# Patient Record
Sex: FEMALE | Race: WHITE | Employment: OTHER | URBAN - METROPOLITAN AREA
[De-identification: names, ages, dates, MRNs, and addresses within clinical notes are randomized per-mention and may not be internally consistent; named-entity substitution may affect disease eponyms.]

---

## 2017-04-13 ENCOUNTER — TELEPHONE (OUTPATIENT)
Dept: FAMILY MEDICINE | Facility: CLINIC | Age: 52
End: 2017-04-13

## 2017-04-13 NOTE — TELEPHONE ENCOUNTER
Talked with patient in [place] until May but is scheduling a pap and mammogram.  When comes in for that discuss colonoscopy or FIT. dp    Panel Management Review      Patient has the following on her problem list: None      Composite cancer screening  Chart review shows that this patient is due/due soon for the following Mammogram, Colonoscopy and Fecal Colorectal (FIT)  Summary:    Patient is due/failing the following:   COLONOSCOPY, FIT and MAMMOGRAM    Action needed:   Patient needs office visit for pap and mammogram.    Type of outreach:    Phone, spoke to patient.  is scheduling for a pap and mammogram.    Questions for provider review:    None                                                                                                                                    Virginia Ledesma     Chart routed to Care Team .

## 2017-04-28 DIAGNOSIS — G47.01 INSOMNIA DUE TO MEDICAL CONDITION: ICD-10-CM

## 2017-04-28 DIAGNOSIS — F41.1 GENERALIZED ANXIETY DISORDER: ICD-10-CM

## 2017-04-28 NOTE — TELEPHONE ENCOUNTER
Diazepam      Last Written Prescription Date: 10/12/2016  Last Fill Quantity: 30,  # refills: 5   Last Office Visit with [facility], [facility] or [facility] prescribing provider: 10/12/2016                                         Next 5 appointments (look out 90 days)     May 23, 2017  1:00 PM CDT   PHYSICAL with Dinorah Lorenzo DO   [facility]    [address] [id number] [phone]                    She is back from [place].

## 2017-04-28 NOTE — TELEPHONE ENCOUNTER
Pt uses at bedtime for EZEQUIEL and insomnia.    She was last seen 10/12/16.  Last filled same date.  Routed to provider for refill consideration.  Mei Palacios RN

## 2017-05-04 RX ORDER — DIAZEPAM 2 MG
2 TABLET ORAL
Qty: 30 TABLET | Status: CANCELLED | OUTPATIENT
Start: 2017-05-04

## 2017-05-04 NOTE — TELEPHONE ENCOUNTER
Left message for pt to call the care team for a provider message to them   Waiting on call back   Pt has a pending apt later this month see if she has medication till she is seen or if she wants to move apt up      Alice Vila  [facility]

## 2017-05-09 ENCOUNTER — OFFICE VISIT (OUTPATIENT)
Dept: FAMILY MEDICINE | Facility: CLINIC | Age: 52
End: 2017-05-09
Payer: MEDICARE

## 2017-05-09 VITALS
DIASTOLIC BLOOD PRESSURE: 63 MMHG | HEIGHT: 64 IN | WEIGHT: 156 LBS | SYSTOLIC BLOOD PRESSURE: 103 MMHG | BODY MASS INDEX: 26.63 KG/M2 | TEMPERATURE: 98 F | HEART RATE: 86 BPM

## 2017-05-09 DIAGNOSIS — G47.10 HYPERSOMNIA: ICD-10-CM

## 2017-05-09 DIAGNOSIS — E03.8 SUBCLINICAL HYPOTHYROIDISM: Chronic | ICD-10-CM

## 2017-05-09 DIAGNOSIS — E10.65 TYPE 1 DIABETES MELLITUS WITH HYPERGLYCEMIA (H): Chronic | ICD-10-CM

## 2017-05-09 DIAGNOSIS — G47.01 INSOMNIA DUE TO MEDICAL CONDITION: ICD-10-CM

## 2017-05-09 DIAGNOSIS — Z12.4 SCREENING FOR MALIGNANT NEOPLASM OF CERVIX: ICD-10-CM

## 2017-05-09 DIAGNOSIS — Z00.00 ENCOUNTER FOR GENERAL ADULT MEDICAL EXAMINATION WITHOUT ABNORMAL FINDINGS: Primary | ICD-10-CM

## 2017-05-09 DIAGNOSIS — F41.1 GENERALIZED ANXIETY DISORDER: ICD-10-CM

## 2017-05-09 DIAGNOSIS — I25.111 CORONARY ARTERY DISEASE INVOLVING NATIVE CORONARY ARTERY OF NATIVE HEART WITH ANGINA PECTORIS WITH DOCUMENTED SPASM (H): Chronic | ICD-10-CM

## 2017-05-09 DIAGNOSIS — Z12.11 SCREEN FOR COLON CANCER: ICD-10-CM

## 2017-05-09 LAB
HBA1C MFR BLD: 9.9 % (ref 4.3–6)
T4 FREE SERPL-MCNC: 1.01 NG/DL (ref 0.76–1.46)
TSH SERPL DL<=0.005 MIU/L-ACNC: 6.32 MU/L (ref 0.4–4)

## 2017-05-09 PROCEDURE — 99207 C FOOT EXAM  NO CHARGE: CPT | Mod: 25 | Performed by: INTERNAL MEDICINE

## 2017-05-09 PROCEDURE — 36415 COLL VENOUS BLD VENIPUNCTURE: CPT | Performed by: INTERNAL MEDICINE

## 2017-05-09 PROCEDURE — G0476 HPV COMBO ASSAY CA SCREEN: HCPCS | Performed by: INTERNAL MEDICINE

## 2017-05-09 PROCEDURE — 99213 OFFICE O/P EST LOW 20 MIN: CPT | Mod: 25 | Performed by: INTERNAL MEDICINE

## 2017-05-09 PROCEDURE — 87624 HPV HI-RISK TYP POOLED RSLT: CPT | Performed by: INTERNAL MEDICINE

## 2017-05-09 PROCEDURE — 83036 HEMOGLOBIN GLYCOSYLATED A1C: CPT | Performed by: INTERNAL MEDICINE

## 2017-05-09 PROCEDURE — 84443 ASSAY THYROID STIM HORMONE: CPT | Performed by: INTERNAL MEDICINE

## 2017-05-09 PROCEDURE — G0145 SCR C/V CYTO,THINLAYER,RESCR: HCPCS | Performed by: INTERNAL MEDICINE

## 2017-05-09 PROCEDURE — 84439 ASSAY OF FREE THYROXINE: CPT | Performed by: INTERNAL MEDICINE

## 2017-05-09 PROCEDURE — 99396 PREV VISIT EST AGE 40-64: CPT | Performed by: INTERNAL MEDICINE

## 2017-05-09 RX ORDER — DIAZEPAM 2 MG
2 TABLET ORAL
Qty: 30 TABLET | Refills: 5 | Status: SHIPPED | OUTPATIENT
Start: 2017-05-09 | End: 2017-11-09

## 2017-05-09 RX ORDER — LEVOTHYROXINE SODIUM 25 UG/1
25 TABLET ORAL DAILY
Qty: 90 TABLET | Refills: 3 | Status: SHIPPED | OUTPATIENT
Start: 2017-05-09 | End: 2017-05-09

## 2017-05-09 RX ORDER — METHYLPHENIDATE HYDROCHLORIDE 20 MG/1
TABLET ORAL
Qty: 30 TABLET | Refills: 0 | Status: SHIPPED | OUTPATIENT
Start: 2017-05-09 | End: 2017-08-15

## 2017-05-09 RX ORDER — METHYLPHENIDATE HYDROCHLORIDE 20 MG/1
10-20 TABLET ORAL DAILY
Qty: 30 TABLET | Refills: 0 | Status: SHIPPED | OUTPATIENT
Start: 2017-06-09 | End: 2017-08-15

## 2017-05-09 NOTE — NURSING NOTE
"Chief Complaint   Patient presents with     Physical     pap       Initial /63 (BP Location: Right arm, Patient Position: Chair, Cuff Size: Adult Regular)  Pulse 86  Temp 98  F (36.7  C) (Tympanic)  Ht 5' 4\" (1.626 m)  Wt 156 lb (70.8 kg)  BMI 26.78 kg/m2 Estimated body mass index is 26.78 kg/(m^2) as calculated from the following:    Height as of this encounter: 5' 4\" (1.626 m).    Weight as of this encounter: 156 lb (70.8 kg).  Medication Reconciliation: complete  "

## 2017-05-09 NOTE — PROGRESS NOTES
SUBJECTIVE:     CC: Glory Birmingham is an 51 year old woman who presents for preventive health visit.     Healthy Habits:    Do you get at least three servings of calcium containing foods daily (dairy, green leafy vegetables, etc.)? yes    Amount of exercise or daily activities, outside of work: 3 day(s) per week    Problems taking medications regularly No    Medication side effects: No    Have you had an eye exam in the past two years? yes    Do you see a dentist twice per year? No  Do you have sleep apnea, excessive snoring or daytime drowsiness?no    Dm-1:  In [place] for winter, returned 2 years ago.  Did see provider there, but only once.  A1c was not checked there.  She has appointment with Endo in [facility].  She doesn't want to continue to follow there because she doesn't feel that she is getting good care there.  Wants to get pump, but hasn't gotten it.  Has hypoglycemia unawareness, wants to get CGM.    Colon cancer screen;  Bro has polyps, getting cscope yearly.  Maternal grandfather had c-cancer  History of bilateral mastectomy.    Angina: occurred while in [place] after missing aspirin for several days/weeks.  Aspirin did help reduce frequency of angina.  Last stress in 2013.   She was also on imdur and ranexa.  imdura = headache;  Unclear why ranexa was stopped. On metoprolol.  Last episode several months ago.    1. Myocardial perfusion imaging using single isotope technique  demonstrated a small perfusion defect of mild severity involving the  mid anterior wall which is mildly reversible. These findings may be  consistent with mild ischemia in the ramus/diagonal territory.  2. Gated images demonstrated normal left ventricular wall motion. The  left ventricular systolic function is 58% at rest and 71% on the post  stress images.  3. Compared to the prior study from 2/19/2008, the findings appear  similar, since mild reversibility in the anterior wall is also  described on that study .         Today's  PHQ-2 Score:   PHQ-2 ( 1999 [employer]) 5/9/2017 6/7/2016   Q1: Little interest or pleasure in doing things 0 0   Q2: Feeling down, depressed or hopeless 0 0   PHQ-2 Score 0 0       Abuse: Current or Past(Physical, Sexual or Emotional)- No  Do you feel safe in your environment - Yes    Social History   Substance Use Topics     Smoking status: Never Smoker     Smokeless tobacco: Never Used     Alcohol use No     The patient does not drink >3 drinks per day nor >7 drinks per week.    Recent Labs   Lab Test 06/28/16  06/25/15   0910  02/12/14   0916   CHOL   --   175  200   HDL   --   83  84   LDL  84  78  94   TRIG   --   72  114   CHOLHDLRATIO   --   2.1  2.0       Reviewed orders with patient.  Reviewed health maintenance and updated orders accordingly - No    Mammo Decision Support:  Mammo discussed, not appropriate for or declined by this patient. History of bilateral mastectomy for nonmalignant reasons    Pertinent mammograms are reviewed under the imaging tab.  History of abnormal Pap smear: NO - age 30- 65 PAP every 3 years recommended    Reviewed and updated as needed this visit by clinical staff  Tobacco  Allergies  Med Hx  Surg Hx  Fam Hx         Reviewed and updated as needed this visit by Provider  Med Hx  Surg Hx  Fam Hx          ROS:  C: NEGATIVE for fever, chills, change in weight  I: NEGATIVE for worrisome rashes, moles or lesions  E: NEGATIVE for vision changes or irritation  ENT: NEGATIVE for ear, mouth and throat problems  R: NEGATIVE for significant cough or SOB  B: NEGATIVE for masses, tenderness or discharge  CV: NEGATIVE for chest pain, palpitations or peripheral edema  GI: NEGATIVE for nausea, abdominal pain, heartburn, or change in bowel habits  : NEGATIVE for unusual urinary or vaginal symptoms. No vaginal bleeding.  M: NEGATIVE for significant arthralgias or myalgia  N: NEGATIVE for weakness, dizziness or paresthesias  P: NEGATIVE for changes in mood or affect     Problem list,  Medication list, Allergies, and Medical/Social/Surgical histories reviewed in [facility] and updated as appropriate.  Labs reviewed in [facility]  Current Outpatient Prescriptions   Medication Sig Dispense Refill     insulin glargine (LANTUS SOLOSTAR) 100 UNIT/ML injection Inject 10 Units Subcutaneous 2 times daily 9 mL 11     methylphenidate (RITALIN) 20 MG tablet Take 1/2-1 tablet by mouth in the morning. 30 tablet 0     diazepam (VALIUM) 2 MG tablet Take 1 tablet (2 mg) by mouth nightly as needed for anxiety or sleep 30 tablet 5     [START ON 6/9/2017] methylphenidate (RITALIN) 20 MG tablet Take 0.5-1 tablets (10-20 mg) by mouth daily 30 tablet 0     losartan (COZAAR) 25 MG tablet Take 0.5 tablets (12.5 mg) by mouth daily 45 tablet 3     metoprolol (TOPROL-XL) 25 MG 24 hr tablet Take 1 tablet (25 mg) by mouth daily 90 tablet 2     estradiol (ESTRACE) 0.5 MG tablet 1 tab vaginallly twice a week 24 tablet 3     atorvastatin (LIPITOR) 10 MG tablet Take 5mg (one-half tablet) twice a week 30 tablet 3     amitriptyline (ELAVIL) 10 MG tablet Take 1 tablet (10 mg) by mouth At Bedtime 90 tablet 3     omeprazole (PRILOSEC) 20 MG capsule Take 1 capsule (20 mg) by mouth daily 90 capsule 3     OMEGA-3 FATTY ACIDS PO Take 1 capsule by mouth daily       insulin aspart (NOVOLOG VIAL) 100 UNITS/ML soln 1 unit per 12 grams of carb at meals, sliding scale: 150-190 +1, 191-230 +2, 231-270 +3, 271-310 +4, 311-350 +5, 351-390 +6, 391-430 +7, 431-470 +8 3 Month 3     magnesium 250 MG tablet Take 1 tablet by mouth daily.       Zinc 15 MG CAPS Take 1 tablet by mouth daily.       cholecalciferol (VITAMIN D) 1000 UNIT tablet Take 1 tablet by mouth daily. 1 tablet 0     ASPIRIN 81 MG OR TABS 1 TABLET DAILY       methylphenidate (RITALIN) 20 MG tablet Take 1/2-1 tablet by mouth in the morning. 30 tablet 0     glucagon (GLUCAGON EMERGENCY) 1 MG injection Inject 1 mg Subcutaneous once for 1 dose 1 mg 1     BD ULTRA FINE PEN NEEDLES Inject 1 each  "Subcutaneous 6 times daily 1 Box 3     [DISCONTINUED] insulin glargine (LANTUS SOLOSTAR) 100 UNIT/ML PEN Inject 9 Units Subcutaneous 2 times daily        nitroglycerin (NITROSTAT) 0.4 MG SL tablet Place 1 tablet (0.4 mg) under the tongue every 5 minutes as needed for chest pain 25 tablet 3     blood glucose test strip Use as directed, up to 6 times a day. One Touch test strips 500 strip 2     [DISCONTINUED] BD ULTRA FINE PEN NEEDLES Inject 1 each Subcutaneous 3 times daily. 1 Box 11     OBJECTIVE:     /63 (BP Location: Right arm, Patient Position: Chair, Cuff Size: Adult Regular)  Pulse 86  Temp 98  F (36.7  C) (Tympanic)  Ht 5' 4\" (1.626 m)  Wt 156 lb (70.8 kg)  BMI 26.78 kg/m2  EXAM:  GENERAL: healthy, alert and no distress  EYES: Eyes grossly normal to inspection, PERRL and conjunctivae and sclerae normal  HENT: ear canals and TM's normal, nose and mouth without ulcers or lesions  NECK: no adenopathy, no asymmetry, masses, or scars and thyroid normal to palpation  RESP: lungs clear to auscultation - no rales, rhonchi or wheezes  CV: regular rate and rhythm, normal S1 S2, no S3 or S4, no murmur, click or rub, no peripheral edema and peripheral pulses strong  ABDOMEN: soft, nontender, no hepatosplenomegaly, no masses and bowel sounds normal   (female): Atrophic female external genitalia, normal urethral meatus, vaginal mucosa pink, moist, well rugated, and normal cervix/adnexa/uterus without masses or discharge.  Pap collected  MS: no gross musculoskeletal defects noted, no edema  SKIN: no suspicious lesions or rashes  NEURO: Normal strength and tone, mentation intact and speech normal  PSYCH: mentation appears normal, affect normal/bright  diabetes foot exam:  Sensation absent in 10/10 spots tested bilaterally.  Fungal involvement of the skin and nails    ASSESSMENT/PLAN:     1. Encounter for general adult medical examination without abnormal findings    2. Type 1 diabetes mellitus with hyperglycemia " (HCC) - uncontrolled. She has a follow-up appointment with endocrinology next week. After that she will transition care back to myself. We'll have her set up with the diabetes educator to get the continuous glucose monitor and the pump. Return to clinic in 3 months for recheck  - insulin glargine (LANTUS SOLOSTAR) 100 UNIT/ML injection; Inject 10 Units Subcutaneous 2 times daily  Dispense: 9 mL; Refill: 11  - TSH with free T4 reflex  - Hemoglobin A1c  - DIABETES EDUCATOR REFERRAL  - Lipid panel reflex to direct LDL; Future  - **Albumin Random Urine Quant FUTURE 3mo; Future  - Basic metabolic panel; Future  - Hemoglobin A1c; Future  - FOOT EXAM    3. Coronary artery disease involving native coronary artery of native heart with angina pectoris with documented spasm (H) - recent angina, improved with resuming her regular aspirin. Patient has not had angina in 2 months. Advised patient to contact the clinic right away if angina recurs, and another stress test should be ordered.    4. Subclinical hypothyroidism - patient is on levothyroxine 25  g. Discussed that this is not medically indicated, and she can stop this therapy, which she agrees with. Recheck in 3-6 months.  - **TSH with free T4 reflex FUTURE 6mo; Future    5. Hypersomnia - stable, refill provided  - methylphenidate (RITALIN) 20 MG tablet; Take 1/2-1 tablet by mouth in the morning.  Dispense: 30 tablet; Refill: 0  - methylphenidate (RITALIN) 20 MG tablet; Take 0.5-1 tablets (10-20 mg) by mouth daily  Dispense: 30 tablet; Refill: 0    6. Generalized anxiety disorder - refill given  - diazepam (VALIUM) 2 MG tablet; Take 1 tablet (2 mg) by mouth nightly as needed for anxiety or sleep  Dispense: 30 tablet; Refill: 5    7. Insomnia due to medical condition - refill given  - diazepam (VALIUM) 2 MG tablet; Take 1 tablet (2 mg) by mouth nightly as needed for anxiety or sleep  Dispense: 30 tablet; Refill: 5    8. Screening for malignant neoplasm of cervix  - Pap  "imaged thin layer screen with HPV - recommended age 30 - 65 years (select HPV order below)  - HPV High Risk Types DNA Cervical    9. Screen for colon cancer - patient agreeable to cscope.  - GASTROENTEROLOGY ADULT REF PROCEDURE ONLY    1. Referral to diabetes RN for pump initiation.  2. You are due for a colonoscopy.  Please call [phone] to schedule.  Take 2/3 of long acting insulin Lantus while only taking clears.  Monitor blood sugar every 4-6 hours while taking clears.  Do not take Novolog while not eating.  3. Blood work today  4. Return to clinic 3 months, fasting blood work prior.  5. If angina occurs again, let Dr. Lorenzo know, we should do another stress test.  6. Stop thyroid medication.  We will recheck in 6 months.      COUNSELING:   Reviewed preventive health counseling, as reflected in patient instructions         reports that she has never smoked. She has never used smokeless tobacco.    Estimated body mass index is 26.78 kg/(m^2) as calculated from the following:    Height as of this encounter: 5' 4\" (1.626 m).    Weight as of this encounter: 156 lb (70.8 kg).       Counseling Resources:  ATP IV Guidelines  Pooled Cohorts Equation Calculator  Breast Cancer Risk Calculator  FRAX Risk Assessment  ICSI Preventive Guidelines  Dietary Guidelines for Americans, 2010  USDA's MyPlate  ASA Prophylaxis  Lung CA Screening    Dinorah Lorenzo, DO  [facility]  "

## 2017-05-09 NOTE — MR AVS SNAPSHOT
After Visit Summary   5/9/2017    Glory Birmingham    MRN: [id number]           Patient Information     Date Of Birth          1965        Visit Information        Provider Department      5/9/2017 1:40 PM Dinorah Lorenzo,  [facility]        Today's Diagnoses     Encounter for general adult medical examination without abnormal findings    -  1    Type 1 diabetes mellitus with hyperglycemia (HCC)        Subclinical hypothyroidism        Hypersomnia        Generalized anxiety disorder        Insomnia due to medical condition        Screening for malignant neoplasm of cervix        Screen for colon cancer        Coronary artery disease involving native coronary artery of native heart with angina pectoris with documented spasm (H)          Care Instructions          Thank you for choosing [facility].  You may be receiving a survey in the mail from [employer] regarding your visit today.  Please take a few minutes to complete and return the survey to let us know how we are doing.      If you have questions or concerns, please contact us via [employer] or you can contact your care team at [phone].    Our Clinic hours are:  Monday 6:40 am  to 7:00 pm  Tuesday -Friday 6:40 am to 5:00 pm    The [place] outpatient lab hours are:  Monday - Friday 6:10 am to 4:45 pm  Saturdays 7:00 am to 11:00 am  Appointments are required, call [phone]    If you have clinical questions after hours or would like to schedule an appointment,  call the clinic at [phone].      [facility] [address] [place] [address]    Preventive Health Recommendations  Female Ages 50 - 64    Yearly exam: See your health care provider every year in order to  o Review health changes.   o Discuss preventive care.    o Review your medicines if your doctor has prescribed any.      Get a Pap test every three years (unless you have an abnormal result and your provider advises testing more  often).    If you get Pap tests with HPV test, you only need to test every 5 years, unless you have an abnormal result.     You do not need a Pap test if your uterus was removed (hysterectomy) and you have not had cancer.    You should be tested each year for STDs (sexually transmitted diseases) if you're at risk.     Have a mammogram every 1 to 2 years.    Have a colonoscopy at age 50, or have a yearly FIT test (stool test). These exams screen for colon cancer.      Have a cholesterol test every 5 years, or more often if advised.    Have a diabetes test (fasting glucose) every three years. If you are at risk for diabetes, you should have this test more often.     If you are at risk for osteoporosis (brittle bone disease), think about having a bone density scan (DEXA).    Shots: Get a flu shot each year. Get a tetanus shot every 10 years.    Nutrition:     Eat at least 5 servings of fruits and vegetables each day.    Eat whole-grain bread, whole-wheat pasta and brown rice instead of white grains and rice.    Talk to your provider about Calcium and Vitamin D.     Lifestyle    Exercise at least 150 minutes a week (30 minutes a day, 5 days a week). This will help you control your weight and prevent disease.    Limit alcohol to one drink per day.    No smoking.     Wear sunscreen to prevent skin cancer.     See your dentist every six months for an exam and cleaning.    See your eye doctor every 1 to 2 years.      1. Referral to diabetes RN for pump initiation.  2. You are due for a colonoscopy.  Please call [phone] to schedule.  Take 2/3 of long acting insulin Lantus while only taking clears.  Monitor blood sugar every 4-6 hours while taking clears.  Do not take Novolog while not eating.  3. Blood work today  4. Return to clinic 3 months, fasting blood work prior.  5. If angina occurs again, let Dr. Lorenzo know, we should do another stress test.  6. Stop thyroid medication.  We will recheck in 6 months.           Follow-ups after your visit        Additional Services     DIABETES EDUCATOR REFERRAL       DIABETES SELF MANAGEMENT TRAINING (DSMT)      Your provider has referred you to Diabetes Education: [facility]: Diabetes Education - All [facility] [phone]   [email or web address]    Type of training and number of hours: Previous Diagnosis: Follow-up DSMT - 2 hours.    Medicare covers: 10 hours of initial DSMT in 12 month period from the time of first visit, plus 2 hours of follow-up DSMT annually, and additional hours as requested for insulin training.    Diabetes Type: Type 1             Diabetes Co-Morbidities: hypoglycemia unawareness               A1C Goal:  <7.0       A1C is: Lab Results       Component                Value               Date                       A1C                      9.9                 07/07/2016              If an urgent visit is needed or A1C is above 12, Care Team to call the Diabetes Education Team at [phone] or send an In Basket message to the Diabetes Education Pool (P DIAB ED-PATIENT CARE).    Diabetes Education Topics: Comprehensive Knowledge Assessment and Instruction and Insulin Pump Therapy: Pre-Pump Start Education    Special Educational Needs Requiring Individual DSMT: Additional Insulin Training       MEDICAL NUTRITION THERAPY (MNT) for Diabetes    Medical Nutrition Therapy with a Registered Dietitian can be provided in coordination with Diabetes Self-Management Training to assist in achieving optimal diabetes management.    MNT Type and Hours: Do not initiate MNT at this time.                       Medicare will cover: 3 hours initial MNT in 12 month period after first visit, plus 2 hours of follow-up MNT annually    Please be aware that coverage of these services is subject to the terms and limitations of your health insurance plan.  Call member services at your health plan to determine Diabetes Self-Management Training  benefits and ask which blood glucose monitor brands are covered by your plan.      Please bring the following with you to your appointment:    (1)  List of current medications   (2)  List of Blood Glucose Monitor brands that are covered by your insurance plan  (3)  Blood Glucose Monitor and log book  (4)   Food records for the 3 days prior to your visit    The Certified Diabetes Educator may make diabetes medication adjustments per the CDE Protocol and Collaborative Practice Agreement.            GASTROENTEROLOGY ADULT REF PROCEDURE ONLY                 Future tests that were ordered for you today     Open Future Orders        Priority Expected Expires Ordered    **TSH with free T4 reflex FUTURE 6mo Routine 11/5/2017 12/5/2017 5/9/2017    Lipid panel reflex to direct LDL Routine 8/8/2017 11/7/2017 5/9/2017    **Albumin Random Urine Quant FUTURE 3mo Routine 7/8/2017 9/6/2017 5/9/2017    Basic metabolic panel Routine 8/8/2017 11/7/2017 5/9/2017    Hemoglobin A1c Routine 8/8/2017 11/7/2017 5/9/2017            Who to contact     If you have questions or need follow up information about today's clinic visit or your schedule please contact [facility] directly at [phone].  Normal or non-critical lab and imaging results will be communicated to you by [employer], letter or phone within 4 business days after the clinic has received the results. If you do not hear from us within 7 days, please contact the clinic through [employer] or phone. If you have a critical or abnormal lab result, we will notify you by phone as soon as possible.  Submit refill requests through [email or web address] or call your pharmacy and they will forward the refill request to us. Please allow 3 business days for your refill to be completed.          Additional Information About Your Visit        [facility] Information     [email or web address] gives you secure access to your electronic health record. If you see a primary care provider, you can also send messages to  "your care team and make appointments. If you have questions, please call your primary care clinic.  If you do not have a primary care provider, please call [phone] and they will assist you.        Care EveryWhere ID     This is your Care EveryWhere ID. This could be used by other organizations to access your [facility] medical records  [id number]        Your Vitals Were     Pulse Temperature Height BMI (Body Mass Index)          86 98  F (36.7  C) (Tympanic) 5' 4\" (1.626 m) 26.78 kg/m2         Blood Pressure from Last 3 Encounters:   05/09/17 103/63   10/12/16 116/76   09/20/16 126/74    Weight from Last 3 Encounters:   05/09/17 156 lb (70.8 kg)   10/12/16 155 lb 9.6 oz (70.6 kg)   08/25/16 155 lb (70.3 kg)              We Performed the Following     DIABETES EDUCATOR REFERRAL     FOOT EXAM     GASTROENTEROLOGY ADULT REF PROCEDURE ONLY     Hemoglobin A1c     HPV High Risk Types DNA Cervical     Pap imaged thin layer screen with HPV - recommended age 30 - 65 years (select HPV order below)     TSH with free T4 reflex          Today's Medication Changes          These changes are accurate as of: 5/9/17  2:49 PM.  If you have any questions, ask your nurse or doctor.               These medicines have changed or have updated prescriptions.        Dose/Directions    insulin glargine 100 UNIT/ML injection   Commonly known as:  LANTUS SOLOSTAR   Indication:  and 5 units at bedtime   This may have changed:  how much to take   Used for:  Type 1 diabetes mellitus with hyperglycemia (H)   Changed by:  Dinorah Lorenzo DO        Dose:  10 Units   Inject 10 Units Subcutaneous 2 times daily   Quantity:  9 mL   Refills:  11       * methylphenidate 20 MG tablet   Commonly known as:  RITALIN   This may have changed:  Another medication with the same name was added. Make sure you understand how and when to take each.   Used for:  Hypersomnia   Changed by:  Dinorah Lorenzo DO        Take 1/2-1 tablet by mouth in the " morning.   Quantity:  30 tablet   Refills:  0       * methylphenidate 20 MG tablet   Commonly known as:  RITALIN   This may have changed:  Another medication with the same name was added. Make sure you understand how and when to take each.   Used for:  Hypersomnia   Changed by:  Dinorah Lorenzo DO        Take 1/2-1 tablet by mouth in the morning.   Quantity:  30 tablet   Refills:  0       * methylphenidate 20 MG tablet   Commonly known as:  RITALIN   This may have changed:  You were already taking a medication with the same name, and this prescription was added. Make sure you understand how and when to take each.   Used for:  Hypersomnia   Changed by:  Dinorah Lorenzo DO        Dose:  10-20 mg   Start taking on:  6/9/2017   Take 0.5-1 tablets (10-20 mg) by mouth daily   Quantity:  30 tablet   Refills:  0       * Notice:  This list has 3 medication(s) that are the same as other medications prescribed for you. Read the directions carefully, and ask your doctor or other care provider to review them with you.      Stop taking these medicines if you haven't already. Please contact your care team if you have questions.     levothyroxine 25 MCG tablet   Commonly known as:  SYNTHROID/LEVOTHROID   Stopped by:  Dinorah Lorenzo DO                Where to get your medicines      These medications were sent to [facility] [address] [place] [address]  [address] [facility] [address]     Phone:  [phone]     insulin glargine 100 UNIT/ML injection         Some of these will need a paper prescription and others can be bought over the counter.  Ask your nurse if you have questions.     Bring a paper prescription for each of these medications     diazepam 2 MG tablet    methylphenidate 20 MG tablet    methylphenidate 20 MG tablet                Primary Care Provider Office Phone # Fax #    Dinorah Lorenzo [phone]       [address]  [facility] [address]        Thank you!     Thank you for choosing [facility]  for your care. Our goal is always to provide you with excellent care. Hearing back from our patients is one way we can continue to improve our services. Please take a few minutes to complete the written survey that you may receive in the mail after your visit with us. Thank you!             Your Updated Medication List - Protect others around you: Learn how to safely use, store and throw away your medicines at www.disposemymeds.org.          This list is accurate as of: 5/9/17  2:49 PM.  Always use your most recent med list.                   Brand Name Dispense Instructions for use    amitriptyline 10 MG tablet    ELAVIL    90 tablet    Take 1 tablet (10 mg) by mouth At Bedtime       aspirin 81 MG tablet      1 TABLET DAILY       atorvastatin 10 MG tablet    LIPITOR    30 tablet    Take 5mg (one-half tablet) twice a week       BD ULTRA FINE PEN NEEDLES     1 Box    Inject 1 each Subcutaneous 6 times daily       blood glucose monitoring test strip    no brand specified    500 strip    Use as directed, up to 6 times a day. One Touch test strips       cholecalciferol 1000 UNIT tablet    vitamin D    1 tablet    Take 1 tablet by mouth daily.       diazepam 2 MG tablet    VALIUM    30 tablet    Take 1 tablet (2 mg) by mouth nightly as needed for anxiety or sleep       estradiol 0.5 MG tablet    ESTRACE    24 tablet    1 tab vaginallly twice a week       glucagon 1 MG kit    GLUCAGON EMERGENCY    1 mg    Inject 1 mg Subcutaneous once for 1 dose       insulin aspart 100 UNITS/ML injection    NovoLOG    3 Month    1 unit per 12 grams of carb at meals, sliding scale: 150-190 +1, 191-230 +2, 231-270 +3, 271-310 +4, 311-350 +5, 351-390 +6, 391-430 +7, 431-470 +8       insulin glargine 100 UNIT/ML injection    LANTUS SOLOSTAR    9 mL    Inject 10 Units Subcutaneous 2 times daily       losartan 25 MG tablet    COZAAR    45 tablet     Take 0.5 tablets (12.5 mg) by mouth daily       magnesium 250 MG tablet      Take 1 tablet by mouth daily.       * methylphenidate 20 MG tablet    RITALIN    30 tablet    Take 1/2-1 tablet by mouth in the morning.       * methylphenidate 20 MG tablet    RITALIN    30 tablet    Take 1/2-1 tablet by mouth in the morning.       * methylphenidate 20 MG tablet   Start taking on:  6/9/2017    RITALIN    30 tablet    Take 0.5-1 tablets (10-20 mg) by mouth daily       metoprolol 25 MG 24 hr tablet    TOPROL-XL    90 tablet    Take 1 tablet (25 mg) by mouth daily       nitroglycerin 0.4 MG sublingual tablet    NITROSTAT    25 tablet    Place 1 tablet (0.4 mg) under the tongue every 5 minutes as needed for chest pain       OMEGA-3 FATTY ACIDS PO      Take 1 capsule by mouth daily       omeprazole 20 MG CR capsule    priLOSEC    90 capsule    Take 1 capsule (20 mg) by mouth daily       Zinc 15 MG Caps      Take 1 tablet by mouth daily.       * Notice:  This list has 3 medication(s) that are the same as other medications prescribed for you. Read the directions carefully, and ask your doctor or other care provider to review them with you.

## 2017-05-09 NOTE — PATIENT INSTRUCTIONS
Thank you for choosing [facility].  You may be receiving a survey in the mail from [employer] regarding your visit today.  Please take a few minutes to complete and return the survey to let us know how we are doing.      If you have questions or concerns, please contact us via [employer] or you can contact your care team at [phone].    Our Clinic hours are:  Monday 6:40 am  to 7:00 pm  Tuesday -Friday 6:40 am to 5:00 pm    The [place] outpatient lab hours are:  Monday - Friday 6:10 am to 4:45 pm  Saturdays 7:00 am to 11:00 am  Appointments are required, call [phone]    If you have clinical questions after hours or would like to schedule an appointment,  call the clinic at [phone].      [facility] [address] [place] [address]    Preventive Health Recommendations  Female Ages 50 - 64    Yearly exam: See your health care provider every year in order to  o Review health changes.   o Discuss preventive care.    o Review your medicines if your doctor has prescribed any.      Get a Pap test every three years (unless you have an abnormal result and your provider advises testing more often).    If you get Pap tests with HPV test, you only need to test every 5 years, unless you have an abnormal result.     You do not need a Pap test if your uterus was removed (hysterectomy) and you have not had cancer.    You should be tested each year for STDs (sexually transmitted diseases) if you're at risk.     Have a mammogram every 1 to 2 years.    Have a colonoscopy at age 50, or have a yearly FIT test (stool test). These exams screen for colon cancer.      Have a cholesterol test every 5 years, or more often if advised.    Have a diabetes test (fasting glucose) every three years. If you are at risk for diabetes, you should have this test more often.     If you are at risk for osteoporosis (brittle bone disease), think about having a bone density scan (DEXA).    Shots: Get a flu shot each  year. Get a tetanus shot every 10 years.    Nutrition:     Eat at least 5 servings of fruits and vegetables each day.    Eat whole-grain bread, whole-wheat pasta and brown rice instead of white grains and rice.    Talk to your provider about Calcium and Vitamin D.     Lifestyle    Exercise at least 150 minutes a week (30 minutes a day, 5 days a week). This will help you control your weight and prevent disease.    Limit alcohol to one drink per day.    No smoking.     Wear sunscreen to prevent skin cancer.     See your dentist every six months for an exam and cleaning.    See your eye doctor every 1 to 2 years.      1. Referral to diabetes RN for pump initiation.  2. You are due for a colonoscopy.  Please call [phone] to schedule.  Take 2/3 of long acting insulin Lantus while only taking clears.  Monitor blood sugar every 4-6 hours while taking clears.  Do not take Novolog while not eating.  3. Blood work today  4. Return to clinic 3 months, fasting blood work prior.  5. If angina occurs again, let Dr. Lorenzo know, we should do another stress test.  6. Stop thyroid medication.  We will recheck in 6 months.

## 2017-05-11 LAB
COPATH REPORT: NORMAL
PAP: NORMAL

## 2017-05-15 LAB
FINAL DIAGNOSIS: NORMAL
HPV HR 12 DNA CVX QL NAA+PROBE: NEGATIVE
HPV16 DNA SPEC QL NAA+PROBE: NEGATIVE
HPV18 DNA SPEC QL NAA+PROBE: NEGATIVE
SPECIMEN DESCRIPTION: NORMAL

## 2017-05-16 ENCOUNTER — HOSPITAL ENCOUNTER (EMERGENCY)
Facility: CLINIC | Age: 52
Discharge: HOME | End: 2017-05-16
Attending: FAMILY MEDICINE | Admitting: FAMILY MEDICINE
Payer: MEDICARE

## 2017-05-16 ENCOUNTER — TELEPHONE (OUTPATIENT)
Dept: FAMILY MEDICINE | Facility: CLINIC | Age: 52
End: 2017-05-16

## 2017-05-16 ENCOUNTER — APPOINTMENT (OUTPATIENT)
Dept: GENERAL RADIOLOGY | Facility: CLINIC | Age: 52
End: 2017-05-16
Attending: FAMILY MEDICINE
Payer: MEDICARE

## 2017-05-16 VITALS
TEMPERATURE: 98.3 F | HEIGHT: 64 IN | HEART RATE: 99 BPM | SYSTOLIC BLOOD PRESSURE: 110 MMHG | DIASTOLIC BLOOD PRESSURE: 72 MMHG | WEIGHT: 154 LBS | BODY MASS INDEX: 26.29 KG/M2 | OXYGEN SATURATION: 99 % | RESPIRATION RATE: 9 BRPM

## 2017-05-16 DIAGNOSIS — K31.84 GASTROPARESIS: ICD-10-CM

## 2017-05-16 DIAGNOSIS — K22.4 ESOPHAGEAL SPASM: ICD-10-CM

## 2017-05-16 DIAGNOSIS — K21.9 GASTROESOPHAGEAL REFLUX DISEASE, ESOPHAGITIS PRESENCE NOT SPECIFIED: ICD-10-CM

## 2017-05-16 LAB
ALBUMIN SERPL-MCNC: 3.7 G/DL (ref 3.4–5)
ALP SERPL-CCNC: 134 U/L (ref 40–150)
ALT SERPL W P-5'-P-CCNC: 29 U/L (ref 0–50)
ANION GAP SERPL CALCULATED.3IONS-SCNC: 11 MMOL/L (ref 3–14)
AST SERPL W P-5'-P-CCNC: 20 U/L (ref 0–45)
BASOPHILS # BLD AUTO: 0 10E9/L (ref 0–0.2)
BASOPHILS NFR BLD AUTO: 0.4 %
BILIRUB SERPL-MCNC: 0.4 MG/DL (ref 0.2–1.3)
BUN SERPL-MCNC: 21 MG/DL (ref 7–30)
CALCIUM SERPL-MCNC: 9.1 MG/DL (ref 8.5–10.1)
CHLORIDE SERPL-SCNC: 102 MMOL/L (ref 94–109)
CO2 SERPL-SCNC: 27 MMOL/L (ref 20–32)
CREAT SERPL-MCNC: 0.86 MG/DL (ref 0.52–1.04)
DIFFERENTIAL METHOD BLD: NORMAL
EOSINOPHIL # BLD AUTO: 0.1 10E9/L (ref 0–0.7)
EOSINOPHIL NFR BLD AUTO: 1.2 %
ERYTHROCYTE [DISTWIDTH] IN BLOOD BY AUTOMATED COUNT: 12.1 % (ref 10–15)
GFR SERPL CREATININE-BSD FRML MDRD: 69 ML/MIN/1.7M2
GLUCOSE SERPL-MCNC: 283 MG/DL (ref 70–99)
HCT VFR BLD AUTO: 38.1 % (ref 35–47)
HGB BLD-MCNC: 13.3 G/DL (ref 11.7–15.7)
LIPASE SERPL-CCNC: 160 U/L (ref 73–393)
LYMPHOCYTES # BLD AUTO: 2 10E9/L (ref 0.8–5.3)
LYMPHOCYTES NFR BLD AUTO: 29.6 %
MCH RBC QN AUTO: 31.1 PG (ref 26.5–33)
MCHC RBC AUTO-ENTMCNC: 34.9 G/DL (ref 31.5–36.5)
MCV RBC AUTO: 89 FL (ref 78–100)
MONOCYTES # BLD AUTO: 0.5 10E9/L (ref 0–1.3)
MONOCYTES NFR BLD AUTO: 7.2 %
NEUTROPHILS # BLD AUTO: 4.2 10E9/L (ref 1.6–8.3)
NEUTROPHILS NFR BLD AUTO: 61.6 %
PLATELET # BLD AUTO: 255 10E9/L (ref 150–450)
POTASSIUM SERPL-SCNC: 3.9 MMOL/L (ref 3.4–5.3)
PROT SERPL-MCNC: 7.3 G/DL (ref 6.8–8.8)
RBC # BLD AUTO: 4.28 10E12/L (ref 3.8–5.2)
SODIUM SERPL-SCNC: 140 MMOL/L (ref 133–144)
TROPONIN I SERPL-MCNC: NORMAL UG/L (ref 0–0.04)
TROPONIN I SERPL-MCNC: NORMAL UG/L (ref 0–0.04)
WBC # BLD AUTO: 6.8 10E9/L (ref 4–11)

## 2017-05-16 PROCEDURE — 80053 COMPREHEN METABOLIC PANEL: CPT | Performed by: FAMILY MEDICINE

## 2017-05-16 PROCEDURE — 93010 ELECTROCARDIOGRAM REPORT: CPT | Performed by: FAMILY MEDICINE

## 2017-05-16 PROCEDURE — 71020 XR CHEST 2 VW: CPT

## 2017-05-16 PROCEDURE — 99285 EMERGENCY DEPT VISIT HI MDM: CPT | Mod: 25

## 2017-05-16 PROCEDURE — 83690 ASSAY OF LIPASE: CPT | Performed by: FAMILY MEDICINE

## 2017-05-16 PROCEDURE — 85025 COMPLETE CBC W/AUTO DIFF WBC: CPT | Performed by: FAMILY MEDICINE

## 2017-05-16 PROCEDURE — 93005 ELECTROCARDIOGRAM TRACING: CPT

## 2017-05-16 PROCEDURE — 99284 EMERGENCY DEPT VISIT MOD MDM: CPT | Mod: 25 | Performed by: FAMILY MEDICINE

## 2017-05-16 PROCEDURE — 84484 ASSAY OF TROPONIN QUANT: CPT | Mod: 91 | Performed by: FAMILY MEDICINE

## 2017-05-16 ASSESSMENT — ENCOUNTER SYMPTOMS
EYES NEGATIVE: 1
SHORTNESS OF BREATH: 1
PSYCHIATRIC NEGATIVE: 1
CONSTITUTIONAL NEGATIVE: 1
MUSCULOSKELETAL NEGATIVE: 1
NEUROLOGICAL NEGATIVE: 1
ABDOMINAL PAIN: 1
ALLERGIC/IMMUNOLOGIC NEGATIVE: 1
NAUSEA: 1
ENDOCRINE NEGATIVE: 1

## 2017-05-16 NOTE — TELEPHONE ENCOUNTER
"Reason for Call:  Other chest pain    Detailed comments: Sudden onset of sharp chest pain, took nitro chest pain went away.  This happened about 20 minutes ago.  Has not been feeling very well last couple of days \"just not feeling right\"   Never felt this kind of pain before.  transfer to RN    Phone Number Patient can be reached at: Home number on file [phone] (home)    Best Time: asap    Can we leave a detailed message on this number? YES    Call taken on 5/16/2017 at 2:53 PM by Martha Cash      "

## 2017-05-16 NOTE — ED NOTES
Pt presents to the ER with c/o epigastric pain, sudden onset around 1400 today.  Pt denies n/v/d, diaphoresis and dizziness.  She is not SOA.  Pt states she took 1 nitro SL and the pain resolved entirely.  She has remained pain free since that time.  Pt admits a h/o gastroparesis and states it is difficult to discern whether or not this pain is rt that.  She is in no acute distress.  LSC and pt denies cough.  She does state that she has been struggling with her gastroparesis recently and also reports blood sugar running in the 300's

## 2017-05-16 NOTE — ED PROVIDER NOTES
"  History     Chief Complaint   Patient presents with     Chest Pain     patient having episode   took nitro at home   Had  sharp pain   wilth burning  with burning after sharp pain      GI Problem     HX of gastroparesis  Patient states she thinks her heart and GI are  bothe bothering her      HPI  Glory Birmingham is a 51 year old female, past medical history is significant for type I diabetes, GERD, hypertension, amenorrhea, scleroderma, depression, fibromyalgia, generalized anxiety disorder, subclinical hypothyroidism, hyperlipidemia, recurrent UTI, diabetic gastroparesis, coccydynia, cervicalgia, bilateral occipital neuralgia, since to the emergency department with concerns of irregular heartbeat, chest pain, abdominal pain.  History is obtained from the patient states that at approximately 2:15 after consuming a \"breakfast\" of scrambled eggs mixed with turkey sausage, rao bits, she began with epigastric sharp intense constant pain that she rated as a 10/10.  Mildly nauseated, short of air with it.  She waited a few minutes and when it did not improve she took a nitro sublingual.  Within 2-3 minutes the discomfort lessened to the point where it is at currently which is a very mild 1-2/10 dull ache.  She has no other associated symptoms currently.  At the time of onset she did not have any lightheadedness palpitations radiation of the pain etc.  She has had reflux before but this felt different to her, she is also very aware that she has diabetic gastroparesis and even 4 hours after eating still has significant residual by her account in her stomach.  The symptoms were somewhat atypical for GI problem.  She has an approximately [address] to get to the hospital by her account.    Active Ambulatory Problems     Diagnosis Date Noted     Type 1 diabetes mellitus with hyperglycemia (HCC) 01/31/2006     Gastroesophageal reflux disease without esophagitis 01/31/2006     Benign essential hypertension 01/31/2006     " Amenorrhea 05/28/2008     Thyroid nodules 12/01/2009     Scleredema of [place] (H) 04/19/2010     Coronary artery disease involving native coronary artery of native heart with angina pectoris with documented spasm (H) 04/21/2010     CARDIOVASCULAR SCREENING; LDL GOAL LESS THAN 100 10/31/2010     Idiopathic hypersomnia with long sleep time 10/18/2011     Type 1 diabetes mellitus with diabetic neuropathy (H) 02/28/2012     Mild major depression (H) 08/29/2012     Fibromyalgia 08/19/2013     Generalized anxiety disorder 02/17/2014     History of bilateral saline breast implants 02/17/2014     Subclinical hypothyroidism 04/08/2014     Hyperlipidemia LDL goal <70 04/25/2014     Senile cataract 07/25/2014     Organ transplant candidate 08/07/2014     Recurrent UTI 10/06/2014     Proliferative diabetic retinopathy associated with type 1 diabetes mellitus, macular edema presence unspecified (H) 05/03/2016     Diabetic gastroparesis (H) 05/03/2016     Coccydynia 06/07/2016     Atrophic vaginitis 06/07/2016     Hypoglycemia unawareness in type 1 diabetes mellitus (H) 07/19/2016     Type 1 diabetes mellitus with proliferative retinopathy (H) 07/19/2016     Cervicalgia 08/02/2016     Bilateral occipital neuralgia 08/02/2016     Lumbosacral spondylosis without myelopathy 08/02/2016     Type 1 diabetes mellitus with microalbuminuria (H) 10/12/2016     Resolved Ambulatory Problems     Diagnosis Date Noted     Metrorrhagia 12/14/2006     Myalgia and myositis 04/19/2008     Gastritis, acute 09/19/2008     Type 1 diabetes, HbA1c goal < 8% (H) 10/31/2010     Proliferative diabetic retinopathy (H) 10/26/2011     Thyroid condition 01/18/2012     Pain in limb 02/28/2012     Low back pain 02/28/2012     Past Medical History:   Diagnosis Date     Background diabetic retinopathy(362.01)      CAD (coronary artery disease) 4/21/2010     Coronary artery disease      Depression      Dermatologic disease      Esophageal reflux      Fibromyalgia  2012     Generalized anxiety disorder 2/17/2014     History of bilateral saline breast implants 2/17/2014     Hypertension goal BP (blood pressure) < 140/90 1/31/2006     Idiopathic hypersomnia with long sleep time      Mild major depression (H) 8/29/2012     Multinodular goiter      Pain in limb 2/28/2012     Peripheral neuropathy (H)      Peripheral vascular disease (H)      PONV (postoperative nausea and vomiting)      Proliferative diabetic retinopathy 10/26/2011     Thyroid condition 1/18/2012     Thyroid nodules 12/1/2009     Type I (juvenile type) diabetes mellitus with other specified manifestations, not stated as uncontrolled 1976     Unspecified essential hypertension      Past Surgical History:   Procedure Laterality Date     BENCH PANCREAS  12/10/2012    Procedure: BENCH PANCREAS;;  Surgeon: Soto Mills MD;  Location: [facility] OR     CYSTECTOMY PILONIDAL N/A 7/13/2016    Procedure: CYSTECTOMY PILONIDAL;  Surgeon: Austin Wallace MD;  Location: [facility]     LAPAROSCOPY PROCEDURE UNLISTED  2007    diagnostic     MASTECTOMY, SIMPLE RT/LT/RASHAAD  2000    bilateral mastectomy, saline implants--fibrocystic disease--abnormal mammo-biopsies, etc--     MUSCLE BIOPSY  2008    back of neck,  path found tissue related to diabetes     PHACOEMULSIFICATION WITH STANDARD INTRAOCULAR LENS IMPLANT  7/28/2014    Procedure: PHACOEMULSIFICATION WITH STANDARD INTRAOCULAR LENS IMPLANT;  Surgeon: Roge Avendano MD;  Location: [facility] OR     PHACOEMULSIFICATION WITH STANDARD INTRAOCULAR LENS IMPLANT Right 8/25/2016    Procedure: PHACOEMULSIFICATION WITH STANDARD INTRAOCULAR LENS IMPLANT;  Surgeon: Roge Avendano MD;  Location: [facility] OR     SURGICAL HISTORY OF -       vitrectomy, bilateral     SURGICAL HISTORY OF -       c/section x 3, BTL     Social History     Social History     Marital status:      Spouse name: david     Number of children: 3     Years of education: N/A     Occupational History      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 Yes     Sister MI age 41     Social History Narrative     Family History   Problem Relation Age of Onset     GASTROINTESTINAL DISEASE Mother      Thyroid Disease Mother      Arthritis Mother      Cardiomyopathy Mother      CANCER Father      lung, spread to bone and brain     HEART DISEASE Father      Arthritis Father      DIABETES Sister           HEART DISEASE Sister      Thyroid Disease Sister      C.A.D. Sister      Obesity Daughter      Allergies Son      Depression Sister      Thyroid Disease Sister      Thyroid Disease Sister      Alcohol/Drug Brother      No current facility-administered medications for this encounter.      Current Outpatient Prescriptions   Medication     CINNAMON PO     CRANBERRY PO     insulin glargine (LANTUS SOLOSTAR) 100 UNIT/ML injection     methylphenidate (RITALIN) 20 MG tablet     diazepam (VALIUM) 2 MG tablet     losartan (COZAAR) 25 MG tablet     metoprolol (TOPROL-XL) 25 MG 24 hr tablet     estradiol (ESTRACE) 0.5 MG tablet     atorvastatin (LIPITOR) 10 MG tablet     amitriptyline (ELAVIL) 10 MG tablet     omeprazole (PRILOSEC) 20 MG capsule     OMEGA-3 FATTY ACIDS PO     nitroglycerin (NITROSTAT) 0.4 MG SL tablet     blood glucose test strip     insulin aspart (NOVOLOG VIAL) 100 UNITS/ML soln     magnesium 250 MG tablet     Zinc 15 MG CAPS     cholecalciferol (VITAMIN D) 1000 UNIT tablet     ASPIRIN 81 MG OR TABS     [START ON 2017] methylphenidate (RITALIN)  "20 MG tablet     glucagon (GLUCAGON EMERGENCY) 1 MG injection     BD ULTRA FINE PEN NEEDLES     [DISCONTINUED] BD ULTRA FINE PEN NEEDLES        Allergies   Allergen Reactions     Hmg-Coa-R Inhibitors Muscle Pain (Myalgia) and Cramps     Ibuprofen-Diphenhydramine Cit GI Disturbance     Severe gastritis     Lisinopril Cough     Choking spells       Prednisone GI Disturbance     Severe gastritis     Pregabalin Cramps       I have reviewed the Medications, Allergies, Past Medical and Surgical History, and Social History in the Epic system.    Review of Systems   Constitutional: Negative.    HENT: Negative.    Eyes: Negative.    Respiratory: Positive for shortness of breath.    Cardiovascular: Positive for chest pain.   Gastrointestinal: Positive for abdominal pain and nausea.   Endocrine: Negative.    Genitourinary: Negative.    Musculoskeletal: Negative.    Skin: Negative.    Allergic/Immunologic: Negative.    Neurological: Negative.    Psychiatric/Behavioral: Negative.    All other systems reviewed and are negative.      Physical Exam   BP: 141/82  Pulse: 99  Temp: 98.3  F (36.8  C)  Resp: 18  Height: 162.6 cm (5' 4\")  Weight: 69.9 kg (154 lb)  SpO2: 100 %  Physical Exam   Constitutional: She is oriented to person, place, and time. She appears well-developed and well-nourished.   HENT:   Head: Normocephalic and atraumatic.   Right Ear: External ear normal.   Left Ear: External ear normal.   Nose: Nose normal.   Mouth/Throat: Oropharynx is clear and moist.   Eyes: Conjunctivae and EOM are normal. Pupils are equal, round, and reactive to light.   Neck: Normal range of motion. Neck supple.   Cardiovascular: Normal rate, regular rhythm, normal heart sounds and intact distal pulses.    Pulmonary/Chest: Effort normal and breath sounds normal.   Abdominal: Soft. Bowel sounds are normal.   Musculoskeletal: Normal range of motion.   Neurological: She is alert and oriented to person, place, and time.   Skin: Skin is warm and " dry.   Psychiatric: She has a normal mood and affect. Her behavior is normal.   Nursing note and vitals reviewed.      ED Course     ED Course     Procedures              EKG Interpretation:      Interpreted by Scott Whitehead  Time reviewed:16:15   Symptoms at time of EKG: dull 1-2/10 epigastric discomfort   Rhythm: Normal sinus   Rate: 97    Axis: Normal  Ectopy: None  Conduction: Normal  ST Segments/ T Waves: No ST-T wave changes and No acute ischemic changes  Q Waves: None  Comparison to prior: Unchanged from 7/7/16    Clinical Impression: no acute changes        Critical Care time:  none               Labs Ordered and Resulted from Time of ED Arrival Up to the Time of Departure from the ED   COMPREHENSIVE METABOLIC PANEL - Abnormal; Notable for the following:        Result Value    Glucose 283 (*)     All other components within normal limits   CBC WITH PLATELETS DIFFERENTIAL   LIPASE   TROPONIN I   TROPONIN I   9:02 PM  Patient is asymptomatic, I reviewed negative cardiac troponins ×2 as well as the only significant abnormal being her blood sugar of 283 which is not unusual for her.  I suspect that the patient likely had an episode of esophageal spasm or perhaps reflux.  My suspicion for a cardiac etiology for this is very low given the behavior.  Even considering the use of nitroglycerin with some improvement of symptoms I think this is more likely spasm given the context.  I reassured her in this regard.  Her EKG shows no acute changes and her cardiac troponin is negative ×2.  Would recommend clear fluids and bland diet next 48 hours and return to the emergency department if worse or changes.      Assessments & Plan (with Medical Decision Making)   Assessment and plan with medical decision-making at the times Above.  Disposition to home.      Disclaimer: This note consists of symbols derived from keyboarding, dictation and/or voice recognition software. As a result, there may be errors in the script  that have gone undetected. Please consider this when interpreting information found in this chart.      I have reviewed the nursing notes.    I have reviewed the findings, diagnosis, plan and need for follow up with the patient.    New Prescriptions    No medications on file       Final diagnoses:   Esophageal spasm   Gastroesophageal reflux disease, esophagitis presence not specified   Gastroparesis       5/16/2017   [facility] EMERGENCY DEPARTMENT     Scott Whitehead MD  05/16/17 [id number]

## 2017-05-16 NOTE — ED AVS SNAPSHOT
[facility] Emergency Department    [address] [address]    Phone:  [phone]    Fax:  [phone]                                       Glory Birmingham   MRN: [id number]    Department:  [facility] Emergency Department   Date of Visit:  5/16/2017           After Visit Summary Signature Page     I have received my discharge instructions, and my questions have been answered. I have discussed any challenges I see with this plan with the nurse or doctor.    ..........................................................................................................................................  Patient/Patient Representative Signature      ..........................................................................................................................................  Patient Representative Print Name and Relationship to Patient    ..................................................               ................................................  Date                                            Time    ..........................................................................................................................................  Reviewed by Signature/Title    ...................................................              ..............................................  Date                                                            Time

## 2017-05-16 NOTE — TELEPHONE ENCOUNTER
S-(situation):  Pt describes that she was eating.  About an hour after eating, about 2:30 pm, pt felt a sudden sharp pain in the middle of her chest.  Chest heaviness too.  Unusual fatigue over the past 3 days.    Pt took nitroglycerine after the sharp pain began and nitro relieved the sharp pain.    Chest pain has resolved.    B-(background): Pt has type 1 diabetes.    A-(assessment): acute chest pain.    R-(recommendations):  just got home from work.  Advised he take her to ED now.  If symptoms recur in the meantime, stop, and call 911.  Mei Palacios RN

## 2017-05-16 NOTE — ED AVS SNAPSHOT
[facility] Emergency Department    [address] [address]    Phone:  [phone]    Fax:  [phone]                                       Glory Birmingham   MRN: [id number]    Department:  [facility] Emergency Department   Date of Visit:  5/16/2017           Patient Information     Date Of Birth          1965        Your diagnoses for this visit were:     Esophageal spasm     Gastroesophageal reflux disease, esophagitis presence not specified     Gastroparesis        You were seen by Scott Whitehead MD.      Follow-up Information     Schedule an appointment as soon as possible for a visit with Dinorah Lorenzo DO.    Specialty:  Internal Medicine    Why:  As needed, If symptoms worsen    Contact information:    [facility]  [address] [facility] [address]  [phone]          Discharge Instructions         Esophageal Spasm    The esophagus is a muscular tube that joins your mouth to your stomach. Contractions in the muscles in the esophagus help food move down to the stomach. When these muscles tighten or contract abnormally, it is known as spasm.   When the muscles spasm, it may feel like food is stuck and won t go down. It may cause a feeling of heartburn or a squeezing type of chest pain. The pain may spread to the neck, arm or back. If you try to swallow more food or liquid during a spasm, it may come back up within seconds.  Esophageal spasm is more common in people with gastroesophageal reflux disease (also called GERD). Very hot or very cold foods or foods that are not chewed enough before swallowing may trigger a spasm.  Home care  Medicines  Your healthcare provider may prescribe medicines to help reduce your symptoms. If you have an underlying condition, such as GERD, your healthcare provider may prescribe medicines to help manage it.   Take all medicines as prescribed. Do not take any medicines without talking to your healthcare provider  "first.  Diet    Avoid any foods that seem to cause spasm. This may include very hot or very cold foods.    Eat slowly and chew food well before swallowing.     Avoid alcohol, caffeine, and tobacco, which can delay healing and worsen the problem.    Try eating smaller meals. Have small snacks in between.  Follow-up care  Follow up with your healthcare provider or as advised by our staff.  When to seek medical advice  Call your healthcare provider if any of the following occur:    Food that feels \"stuck\" in the esophagus for more than 30 minutes    Inability to swallow solid or liquids for more than 30 minutes    Symptoms that feel like esophageal spasm but occur with heavy sweating, dizziness, or shortness of breath    Change in the usual patterns of your symptoms of esophageal spasm (new pattern of spreading to the neck, back, shoulder or arm; pain that is more severe than usual)    7404-2031 [employer]. [address] [place] [address]. All rights reserved. This information is not intended as a substitute for professional medical care. Always follow your healthcare professional's instructions.     bland diet and clear fluids ×48 hours.  Return if worse or changes.  Continue your current medications unchanged.      Future Appointments        Provider Department Dept Phone Center    5/18/2017 8:00 AM Noel Everett RD [facility] [phone] [facility]    8/11/2017 9:00 AM PI LAB [place] [phone] [facility]    8/15/2017 10:40 AM Dinorah Lorenzo,  [facility] [phone] [facility]      24 Hour Appointment Hotline       To make an appointment at any [facility], call [phone] ([phone]). If you don't have a family doctor or clinic, we will help you find one. [facility] are conveniently located to serve the needs of you and your family.             Review of your medicines      Our records show that you are taking the " medicines listed below. If these are incorrect, please call your family doctor or clinic.        Dose / Directions Last dose taken    amitriptyline 10 MG tablet   Commonly known as:  ELAVIL   Dose:  10 mg   Quantity:  90 tablet        Take 1 tablet (10 mg) by mouth At Bedtime   Refills:  3        aspirin 81 MG tablet        1 TABLET DAILY   Refills:  0        atorvastatin 10 MG tablet   Commonly known as:  LIPITOR   Quantity:  30 tablet        Take 5mg (one-half tablet) twice a week   Refills:  3        BD ULTRA FINE PEN NEEDLES   Dose:  1 each   Quantity:  1 Box        Inject 1 each Subcutaneous 6 times daily   Refills:  3        blood glucose monitoring test strip   Commonly known as:  no brand specified   Quantity:  500 strip        Use as directed, up to 6 times a day. One Touch test strips   Refills:  2        cholecalciferol 1000 UNIT tablet   Commonly known as:  vitamin D   Dose:  1000 Units   Quantity:  1 tablet        Take 1 tablet by mouth daily.   Refills:  0        CINNAMON PO   Dose:  500 mg        Take 500 mg by mouth daily   Refills:  0        CRANBERRY PO   Dose:  1 capsule        Take 1 capsule by mouth daily   Refills:  0        diazepam 2 MG tablet   Commonly known as:  VALIUM   Dose:  2 mg   Quantity:  30 tablet        Take 1 tablet (2 mg) by mouth nightly as needed for anxiety or sleep   Refills:  5        estradiol 0.5 MG tablet   Commonly known as:  ESTRACE   Quantity:  24 tablet        1 tab vaginallly twice a week   Refills:  3        glucagon 1 MG kit   Commonly known as:  GLUCAGON EMERGENCY   Dose:  1 mg   Quantity:  1 mg        Inject 1 mg Subcutaneous once for 1 dose   Refills:  1        insulin aspart 100 UNITS/ML injection   Commonly known as:  NovoLOG   Quantity:  3 Month        1 unit per 12 grams of carb at meals, sliding scale: 150-190 +1, 191-230 +2, 231-270 +3, 271-310 +4, 311-350 +5, 351-390 +6, 391-430 +7, 431-470 +8   Refills:  3        insulin glargine 100 UNIT/ML injection    Commonly known as:  LANTUS SOLOSTAR   Dose:  10 Units   Indication:  and 5 units at bedtime   Quantity:  9 mL        Inject 10 Units Subcutaneous 2 times daily   Refills:  11        losartan 25 MG tablet   Commonly known as:  COZAAR   Dose:  12.5 mg   Quantity:  45 tablet        Take 0.5 tablets (12.5 mg) by mouth daily   Refills:  3        magnesium 250 MG tablet   Dose:  1 tablet        Take 1 tablet by mouth daily.   Refills:  0        * methylphenidate 20 MG tablet   Commonly known as:  RITALIN   Quantity:  30 tablet        Take 1/2-1 tablet by mouth in the morning.   Refills:  0        * methylphenidate 20 MG tablet   Commonly known as:  RITALIN   Dose:  10-20 mg   Quantity:  30 tablet   Start taking on:  6/9/2017        Take 0.5-1 tablets (10-20 mg) by mouth daily   Refills:  0        metoprolol 25 MG 24 hr tablet   Commonly known as:  TOPROL-XL   Dose:  25 mg   Quantity:  90 tablet        Take 1 tablet (25 mg) by mouth daily   Refills:  2        nitroglycerin 0.4 MG sublingual tablet   Commonly known as:  NITROSTAT   Dose:  0.4 mg   Quantity:  25 tablet        Place 1 tablet (0.4 mg) under the tongue every 5 minutes as needed for chest pain   Refills:  3        OMEGA-3 FATTY ACIDS PO   Dose:  500 mg   Indication:  340-1,000 mg        Take 500 mg by mouth daily   Refills:  0        omeprazole 20 MG CR capsule   Commonly known as:  priLOSEC   Dose:  20 mg   Quantity:  90 capsule        Take 1 capsule (20 mg) by mouth daily   Refills:  3        Zinc 15 MG Caps   Dose:  1 capsule        Take 1 capsule by mouth daily   Refills:  0        * Notice:  This list has 2 medication(s) that are the same as other medications prescribed for you. Read the directions carefully, and ask your doctor or other care provider to review them with you.            Procedures and tests performed during your visit     Procedure/Test Number of Times Performed    CBC with platelets differential 1    Comprehensive metabolic panel 1     EKG 12 lead 1    Lipase 1    Troponin I 2    XR Chest 2 Views 1      Orders Needing Specimen Collection     None      Pending Results     No orders found from 5/14/2017 to 5/17/2017.            Pending Culture Results     No orders found from 5/14/2017 to 5/17/2017.            Pending Results Instructions     If you had any lab results that were not finalized at the time of your Discharge, you can call the ED Lab Result RN at [phone]. You will be contacted by this team for any positive Lab results or changes in treatment. The nurses are available 7 days a week from 10A to 6:30P.  You can leave a message 24 hours per day and they will return your call.        Test Results From Your Hospital Stay        5/16/2017  4:40 PM      Component Results     Component Value Ref Range & Units Status    WBC 6.8 4.0 - 11.0 10e9/L Final    RBC Count 4.28 3.8 - 5.2 10e12/L Final    Hemoglobin 13.3 11.7 - 15.7 g/dL Final    Hematocrit 38.1 35.0 - 47.0 % Final    MCV 89 78 - 100 fl Final    MCH 31.1 26.5 - 33.0 pg Final    MCHC 34.9 31.5 - 36.5 g/dL Final    RDW 12.1 10.0 - 15.0 % Final    Platelet Count 255 150 - 450 10e9/L Final    Diff Method Automated Method  Final    % Neutrophils 61.6 % Final    % Lymphocytes 29.6 % Final    % Monocytes 7.2 % Final    % Eosinophils 1.2 % Final    % Basophils 0.4 % Final    Absolute Neutrophil 4.2 1.6 - 8.3 10e9/L Final    Absolute Lymphocytes 2.0 0.8 - 5.3 10e9/L Final    Absolute Monocytes 0.5 0.0 - 1.3 10e9/L Final    Absolute Eosinophils 0.1 0.0 - 0.7 10e9/L Final    Absolute Basophils 0.0 0.0 - 0.2 10e9/L Final         5/16/2017  4:59 PM      Component Results     Component Value Ref Range & Units Status    Sodium 140 133 - 144 mmol/L Final    Potassium 3.9 3.4 - 5.3 mmol/L Final    Chloride 102 94 - 109 mmol/L Final    Carbon Dioxide 27 20 - 32 mmol/L Final    Anion Gap 11 3 - 14 mmol/L Final    Glucose 283 (H) 70 - 99 mg/dL Final    Urea Nitrogen 21 7 - 30 mg/dL Final    Creatinine  0.86 0.52 - 1.04 mg/dL Final    GFR Estimate 69 >60 mL/min/1.7m2 Final    Non  GFR Calc    GFR Estimate If Black 83 >60 mL/min/1.7m2 Final    African American GFR Calc    Calcium 9.1 8.5 - 10.1 mg/dL Final    Bilirubin Total 0.4 0.2 - 1.3 mg/dL Final    Albumin 3.7 3.4 - 5.0 g/dL Final    Protein Total 7.3 6.8 - 8.8 g/dL Final    Alkaline Phosphatase 134 40 - 150 U/L Final    ALT 29 0 - 50 U/L Final    AST 20 0 - 45 U/L Final         5/16/2017  4:59 PM      Component Results     Component Value Ref Range & Units Status    Lipase 160 73 - 393 U/L Final         5/16/2017  4:59 PM      Component Results     Component Value Ref Range & Units Status    Troponin I ES  0.000 - 0.045 ug/L Final    <0.015  The 99th percentile for upper reference range is 0.045 ug/L.  Troponin values in   the range of 0.045 - 0.120 ug/L may be associated with risks of adverse   clinical events.           5/16/2017  8:10 PM      Narrative     CHEST TWO VIEWS  5/16/2017  4:29 PM     HISTORY: Shortness of air.      COMPARISON: 8/7/2014.    FINDINGS: There are no acute infiltrates. The cardiac silhouette is  not enlarged. Pulmonary vasculature is unremarkable.        Impression     IMPRESSION: No acute disease.    DEBO LUCIA MD         5/16/2017  7:50 PM      Component Results     Component Value Ref Range & Units Status    Troponin I ES  0.000 - 0.045 ug/L Final    <0.015  The 99th percentile for upper reference range is 0.045 ug/L.  Troponin values in   the range of 0.045 - 0.120 ug/L may be associated with risks of adverse   clinical events.                  Thank you for choosing [facility]       Thank you for choosing [facility] for your care. Our goal is always to provide you with excellent care. Hearing back from our patients is one way we can continue to improve our services. Please take a few minutes to complete the written survey that you may receive in the mail after you visit with us. Thank you!        Kenny  Information     [employer] gives you secure access to your electronic health record. If you see a primary care provider, you can also send messages to your care team and make appointments. If you have questions, please call your primary care clinic.  If you do not have a primary care provider, please call [phone] and they will assist you.        Care EveryWhere ID     This is your Care EveryWhere ID. This could be used by other organizations to access your [place] medical records  [id number]        After Visit Summary       This is your record. Keep this with you and show to your community pharmacist(s) and doctor(s) at your next visit.

## 2017-05-17 NOTE — DISCHARGE INSTRUCTIONS
"  Esophageal Spasm    The esophagus is a muscular tube that joins your mouth to your stomach. Contractions in the muscles in the esophagus help food move down to the stomach. When these muscles tighten or contract abnormally, it is known as spasm.   When the muscles spasm, it may feel like food is stuck and won t go down. It may cause a feeling of heartburn or a squeezing type of chest pain. The pain may spread to the neck, arm or back. If you try to swallow more food or liquid during a spasm, it may come back up within seconds.  Esophageal spasm is more common in people with gastroesophageal reflux disease (also called GERD). Very hot or very cold foods or foods that are not chewed enough before swallowing may trigger a spasm.  Home care  Medicines  Your healthcare provider may prescribe medicines to help reduce your symptoms. If you have an underlying condition, such as GERD, your healthcare provider may prescribe medicines to help manage it.   Take all medicines as prescribed. Do not take any medicines without talking to your healthcare provider first.  Diet    Avoid any foods that seem to cause spasm. This may include very hot or very cold foods.    Eat slowly and chew food well before swallowing.     Avoid alcohol, caffeine, and tobacco, which can delay healing and worsen the problem.    Try eating smaller meals. Have small snacks in between.  Follow-up care  Follow up with your healthcare provider or as advised by our staff.  When to seek medical advice  Call your healthcare provider if any of the following occur:    Food that feels \"stuck\" in the esophagus for more than 30 minutes    Inability to swallow solid or liquids for more than 30 minutes    Symptoms that feel like esophageal spasm but occur with heavy sweating, dizziness, or shortness of breath    Change in the usual patterns of your symptoms of esophageal spasm (new pattern of spreading to the neck, back, shoulder or arm; pain that is more severe " than usual)    2832-6459 [employer]. [address] [place] [address], [place] [address]. All rights reserved. This information is not intended as a substitute for professional medical care. Always follow your healthcare professional's instructions.     bland diet and clear fluids ×48 hours.  Return if worse or changes.  Continue your current medications unchanged.

## 2017-05-17 NOTE — ED NOTES
Pt still pain free POC:  Draw 2nd troponin at 1900.  Pt and spouse understand and are in agreement with plan.

## 2017-05-18 ENCOUNTER — ALLIED HEALTH/NURSE VISIT (OUTPATIENT)
Dept: EDUCATION SERVICES | Facility: CLINIC | Age: 52
End: 2017-05-18
Payer: MEDICARE

## 2017-05-18 ENCOUNTER — TELEPHONE (OUTPATIENT)
Dept: EDUCATION SERVICES | Facility: CLINIC | Age: 52
End: 2017-05-18

## 2017-05-18 DIAGNOSIS — E10.65 TYPE 1 DIABETES MELLITUS WITH HYPERGLYCEMIA (H): Primary | Chronic | ICD-10-CM

## 2017-05-18 DIAGNOSIS — R80.9 TYPE 1 DIABETES MELLITUS WITH MICROALBUMINURIA (H): Primary | Chronic | ICD-10-CM

## 2017-05-18 DIAGNOSIS — E10.29 TYPE 1 DIABETES MELLITUS WITH MICROALBUMINURIA (H): Primary | Chronic | ICD-10-CM

## 2017-05-18 PROCEDURE — G0108 DIAB MANAGE TRN  PER INDIV: HCPCS

## 2017-05-18 NOTE — PROGRESS NOTES
Diabetes Self Management Training: Individual Review Visit    Glory Birmingham presents today for education related to Type 1 diabetes.    She is accompanied by self    Patient's diabetes management related comments/concerns: wants to get a pump and cgm    Patient's emotional response to diabetes: expresses readiness to learn    Patient would like this visit to be focused around the following diabetes-related behaviors and goals: Monitoring, Taking Medication and Problem Solving    ASSESSMENT:  Patient Problem List and Family Medical History reviewed for relevant medical history, current medical status, and diabetes risk factors.  Correction:  Novolog  191-235 +1  236-280 +2  281-325 +3  326-370 +4  371-415 +5  416-460 +6  461-485 +7    Insulin to carb ratio: 1 unit to 12  Basal: 10 in am and 10 in pm Lantus  Current Diabetes Management per Patient:  Taking diabetes medications?   yes:     Diabetes Medication(s)     Diabetic Other Sig    glucagon (GLUCAGON EMERGENCY) 1 MG injection Inject 1 mg Subcutaneous once for 1 dose    Insulin Sig    insulin glargine (LANTUS SOLOSTAR) 100 UNIT/ML injection Inject 10 Units Subcutaneous 2 times daily    insulin aspart (NOVOLOG VIAL) 100 UNITS/ML soln 1 unit per 12 grams of carb at meals, sliding scale: 150-190 +1, 191-230 +2, 231-270 +3, 271-310 +4, 311-350 +5, 351-390 +6, 391-430 +7, 431-470 +8          *Abbreviated insulin dose documentation key: Insulin Trade Name ([id number]) - i.e. Humalog 5-5-5-0 (Humalog 5 units at breakfast, 5 units at lunch, and 5 units at dinner).                        Past Diabetes Education: Yes    Patient glucose self monitoring as follows: four times daily.   BG meter: One Touch Ultra Mini meter  BG results:      BG values are: Not in goal  Patient's most recent   Lab Results   Component Value Date    A1C 9.9 05/09/2017    is not meeting goal of <7.0    Nutrition:  Pt does very will with carb counting not an issues, able to list  "carbs in items eaten.  Wrote down three days of food record and had carb listed.  Physical Activity:    Some outside activity    Diabetes Risk Factors:  age over 45 years    Diabetes Complications:  Not discussed today.    Vitals:  There were no vitals taken for this visit.  Estimated body mass index is 26.43 kg/(m^2) as calculated from the following:    Height as of 5/16/17: 1.626 m (5' 4\").    Weight as of 5/16/17: 69.9 kg (154 lb).   Last 3 BP:   BP Readings from Last 3 Encounters:   05/16/17 110/72   05/09/17 103/63   10/12/16 116/76       History   Smoking Status     Never Smoker   Smokeless Tobacco     Never Used       Labs:  Lab Results   Component Value Date    A1C 9.9 05/09/2017     Lab Results   Component Value Date     05/16/2017     Lab Results   Component Value Date    LDL 84 06/28/2016     HDL Cholesterol   Date Value Ref Range Status   06/25/2015 83 >50 mg/dL Final   ]  GFR Estimate   Date Value Ref Range Status   05/16/2017 69 >60 mL/min/1.7m2 Final     Comment:     Non  GFR Calc     GFR Estimate If Black   Date Value Ref Range Status   05/16/2017 83 >60 mL/min/1.7m2 Final     Comment:      GFR Calc     Lab Results   Component Value Date    CR 0.86 05/16/2017     No results found for: MICROALBUMIN    Socio/Economic Considerations:    Support system: family    Health Beliefs and Attitudes:   Patient Activation Measure Survey Score:  EDUARDO Score (Last Two) 3/7/2011   EDUARDO Raw Score 46   Activation Score 75.3   EDUARDO Level 4       Stage of Change: ACTION (Actively working towards change)      Diabetes knowledge and skills assessment:     Patient is knowledgeable in diabetes management concepts related to: Healthy Eating, Being Active, Monitoring and Taking Medication    Patient needs further education on the following diabetes management concepts: Taking Medication, Problem Solving, Reducing Risks and Healthy Coping    Barriers to Learning Assessment: No Barriers " identified    Based on learning assessment above, most appropriate setting for further diabetes education would be: Individual setting.    INTERVENTION:   Have worked with Glory in past  Her numbers are all over the place and have always been that way, she checks her BG levels and gives insulin. Take insulin after eating due to gastroparesis.  Pump would benefit that as well being able to take alternate boluses  She would be a good candidate for the new 670G pump by [employer]  Want to see what is happening overnight will have her wear diagnostic CGM for a week.  Education provided today on:  AADE Self-Care Behaviors:  Healthy Eating: carbohydrate counting and consistency in amount, composition, and timing of food intake  Monitoring: individual blood glucose targets and frequency of monitoring  Taking Medication: went over pumps and sensors, placed dexcom sensor.  Medicare not covering new 670G at this time, may do dexcom first and then transitiona to pump with sensor.    Opportunities for ongoing education and support in diabetes-self management were discussed.    Pt verbalized understanding of concepts discussed and recommendations provided today.       Education Materials Provided:  dexcom info  670G material provided    PLAN:  See Patient Instructions for co-developed, patient-stated behavior change goals.  AVS printed and provided to patient today.    FOLLOW-UP:  Follow-up appointment scheduled   Ongoing plan for education and support: Follow-up visit with diabetes educator in one month      Time Spent: 60 minutes  Encounter Type: Individual    Any diabetes medication dose changes were made via the CDE Protocol and Collaborative Practice Agreement with the patient's primary care provider. A copy of this encounter was shared with the provider.

## 2017-05-18 NOTE — MR AVS SNAPSHOT
"              After Visit Summary   5/18/2017    Glory [place]    MRN: [id number]           Patient Information     Date Of Birth          1965        Visit Information        Provider Department      5/18/2017 8:00 AM [address], RD [facility]        Care Instructions    1.  Wear sensor and record all information    1. After 2 hours, you will be prompted to enter 2 blood sugar readings to calibrate the . Take two different samples (from different fingers). Wash your hands well before calibrating.    2. Check blood sugar once at least every 12 hours and enter it into the Dexcom  to calibrate. Checking blood sugar before meals and/or at bedtime is recommended. Blood sugar levels must be between  for the  to accept the calibration.    3. Record what you eat at meals and snacks with portion eaten. Record amount of carbohydrate grams in the Dexcom  when you eat to help track response to food intake. Record exercise type and time. Record medications you take and the time they are taken.     4. When monitor reads that \"Sensor needs to be Changed,\" go into menu function and hit \"STOP\" (NOT shutdown)    5. If battery power is low (will see indicator on monitor) and need to recharge the monitor, only charge for 1-3 hours.  DO NOT charge for more than 3 hours since it can damage monitor!    6. Avoid taking Tylenol while wearing the Dexcom, as it can cause inaccurate glucose readings.     7. Return all equipment and any food/exercise/medication logs to the [facility] on Tuesday evening (see checklist).    8. Follow-up appointment for review of sensor reports scheduled.        Follow-ups after your visit        Your next 10 appointments already scheduled     Jun 21, 2017   Procedure with Austin Wallace MD   [facility] ([facility])    [address] [address] [phone]           The medical [place] " is located at [address] (between [address] in [place], [place] north of [place]).            Jun 22, 2017  9:00 AM CDT   Diabetic Education with [address], RD   [facility]    [address] [place] [address] [phone]            Jul 06, 2017  1:00 PM CDT   Diabetic Education with [address], RD   [facility] ([facility])    [address] [place] [address] [phone]            Aug 11, 2017  9:00 AM CDT   LAB with PI LAB   [facility] ([facility])    [address] [place] [address] [phone]           Patient must bring picture ID.  Patient should be prepared to give a urine specimen  Please do not eat 10-12 hours before your appointment if you are coming in fasting for labs on lipids, cholesterol, or glucose (sugar).  Pregnant women should follow their Care Team instructions. Water with medications is okay. Do not drink coffee or other fluids.   If you have concerns about taking  your medications, please ask at office or if scheduling via [employer], send a message by clicking on Secure Messaging, Message Your Care Team.            Aug 15, 2017 10:40 AM CDT   SHORT with Dinorah Lorenzo, DO   [facility] ([facility]    [address] [id number] [phone]              Who to contact     If you have questions or need follow up information about today's clinic visit or your schedule please contact [facility] directly at [phone].  Normal or non-critical lab and imaging results will be communicated to you by MyChart, letter or phone within 4 business days after the clinic has received the results. If you do not hear from us within 7 days, please contact the clinic through MyChart or phone. If you have a critical or abnormal lab result, we will notify you  by phone as soon as possible.  Submit refill requests through [employer] or call your pharmacy and they will forward the refill request to us. Please allow 3 business days for your refill to be completed.          Additional Information About Your Visit        [employer] Information     [employer] gives you secure access to your electronic health record. If you see a primary care provider, you can also send messages to your care team and make appointments. If you have questions, please call your primary care clinic.  If you do not have a primary care provider, please call [phone] and they will assist you.        Care EveryWhere ID     This is your Care EveryWhere ID. This could be used by other organizations to access your [place] medical records  [id number]         Blood Pressure from Last 3 Encounters:   05/16/17 110/72   05/09/17 103/63   10/12/16 116/76    Weight from Last 3 Encounters:   05/16/17 69.9 kg (154 lb)   05/09/17 70.8 kg (156 lb)   10/12/16 70.6 kg (155 lb 9.6 oz)              Today, you had the following     No orders found for display         Today's Medication Changes          These changes are accurate as of: 5/18/17  9:15 AM.  If you have any questions, ask your nurse or doctor.               These medicines have changed or have updated prescriptions.        Dose/Directions    atorvastatin 10 MG tablet   Commonly known as:  LIPITOR   This may have changed:    - how much to take  - how to take this  - when to take this  - additional instructions   Used for:  Coronary artery disease due to lipid rich plaque        Take 5mg (one-half tablet) twice a week   Quantity:  30 tablet   Refills:  3       estradiol 0.5 MG tablet   Commonly known as:  ESTRACE   This may have changed:    - how much to take  - how to take this  - when to take this  - additional instructions   Used for:  Atrophic vaginitis        1 tab vaginallly twice a week   Quantity:  24 tablet   Refills:  3                Primary Care  Provider Office Phone # Fax #    Dinorah Lorenzo [phone]       [facility] [address] [address]        Thank you!     Thank you for choosing [facility]  for your care. Our goal is always to provide you with excellent care. Hearing back from our patients is one way we can continue to improve our services. Please take a few minutes to complete the written survey that you may receive in the mail after your visit with us. Thank you!             Your Updated Medication List - Protect others around you: Learn how to safely use, store and throw away your medicines at www.disposemymeds.org.          This list is accurate as of: 5/18/17  9:15 AM.  Always use your most recent med list.                   Brand Name Dispense Instructions for use    amitriptyline 10 MG tablet    ELAVIL    90 tablet    Take 1 tablet (10 mg) by mouth At Bedtime       aspirin 81 MG tablet      1 TABLET DAILY       atorvastatin 10 MG tablet    LIPITOR    30 tablet    Take 5mg (one-half tablet) twice a week       BD ULTRA FINE PEN NEEDLES     1 Box    Inject 1 each Subcutaneous 6 times daily       blood glucose monitoring test strip    no brand specified    500 strip    Use as directed, up to 6 times a day. One Touch test strips       cholecalciferol 1000 UNIT tablet    vitamin D    1 tablet    Take 1 tablet by mouth daily.       CINNAMON PO      Take 500 mg by mouth daily       CRANBERRY PO      Take 1 capsule by mouth daily       diazepam 2 MG tablet    VALIUM    30 tablet    Take 1 tablet (2 mg) by mouth nightly as needed for anxiety or sleep       estradiol 0.5 MG tablet    ESTRACE    24 tablet    1 tab vaginallly twice a week       glucagon 1 MG kit    GLUCAGON EMERGENCY    1 mg    Inject 1 mg Subcutaneous once for 1 dose       insulin aspart 100 UNITS/ML injection    NovoLOG    3 Month    1 unit per 12 grams of carb at meals, sliding scale: 150-190 +1, 191-230 +2, 231-270  +3, 271-310 +4, 311-350 +5, 351-390 +6, 391-430 +7, 431-470 +8       insulin glargine 100 UNIT/ML injection    LANTUS SOLOSTAR    9 mL    Inject 10 Units Subcutaneous 2 times daily       losartan 25 MG tablet    COZAAR    45 tablet    Take 0.5 tablets (12.5 mg) by mouth daily       magnesium 250 MG tablet      Take 1 tablet by mouth daily.       * methylphenidate 20 MG tablet    RITALIN    30 tablet    Take 1/2-1 tablet by mouth in the morning.       * methylphenidate 20 MG tablet   Start taking on:  6/9/2017    RITALIN    30 tablet    Take 0.5-1 tablets (10-20 mg) by mouth daily       metoprolol 25 MG 24 hr tablet    TOPROL-XL    90 tablet    Take 1 tablet (25 mg) by mouth daily       nitroglycerin 0.4 MG sublingual tablet    NITROSTAT    25 tablet    Place 1 tablet (0.4 mg) under the tongue every 5 minutes as needed for chest pain       OMEGA-3 FATTY ACIDS PO      Take 500 mg by mouth daily       omeprazole 20 MG CR capsule    priLOSEC    90 capsule    Take 1 capsule (20 mg) by mouth daily       Zinc 15 MG Caps      Take 1 capsule by mouth daily       * Notice:  This list has 2 medication(s) that are the same as other medications prescribed for you. Read the directions carefully, and ask your doctor or other care provider to review them with you.

## 2017-05-18 NOTE — TELEPHONE ENCOUNTER
Dr. Lorenzo,     I saw Glory today and we are going to work on getting her on pump and CGM to better control her blood sugars.  Are you ok with placing a diagnostic CGM on her to help with insulin adjustment? (please place a diabetes education referral that list diagnostic sensor, working toward pump and CGM).  Also I need you to place a fasting blood sugar and c-peptide order for lab, she will need this to be on pump with medicare.  Thank you! Ryanne Everett RD, CDE

## 2017-05-18 NOTE — PATIENT INSTRUCTIONS
"1.  Wear sensor and record all information    1. After 2 hours, you will be prompted to enter 2 blood sugar readings to calibrate the . Take two different samples (from different fingers). Wash your hands well before calibrating.    2. Check blood sugar once at least every 12 hours and enter it into the Dexcom  to calibrate. Checking blood sugar before meals and/or at bedtime is recommended. Blood sugar levels must be between  for the  to accept the calibration.    3. Record what you eat at meals and snacks with portion eaten. Record amount of carbohydrate grams in the Dexcom  when you eat to help track response to food intake. Record exercise type and time. Record medications you take and the time they are taken.     4. When monitor reads that \"Sensor needs to be Changed,\" go into menu function and hit \"STOP\" (NOT shutdown)    5. If battery power is low (will see indicator on monitor) and need to recharge the monitor, only charge for 1-3 hours.  DO NOT charge for more than 3 hours since it can damage monitor!    6. Avoid taking Tylenol while wearing the Dexcom, as it can cause inaccurate glucose readings.     7. Return all equipment and any food/exercise/medication logs to the [facility] on Tuesday evening (see checklist).    8. Follow-up appointment for review of sensor reports scheduled.  "

## 2017-05-19 ENCOUNTER — VIRTUAL VISIT (OUTPATIENT)
Dept: EDUCATION SERVICES | Facility: CLINIC | Age: 52
End: 2017-05-19

## 2017-05-19 DIAGNOSIS — E10.65 TYPE 1 DIABETES MELLITUS WITH HYPERGLYCEMIA (H): Primary | Chronic | ICD-10-CM

## 2017-05-19 NOTE — MR AVS SNAPSHOT
After Visit Summary   5/19/2017    Glory [place]    MRN: [id number]           Patient Information     Date Of Birth          1965        Visit Information        Provider Department      5/19/2017 11:35 AM [address], RD [facility]        Today's Diagnoses     Type 1 diabetes mellitus with hyperglycemia (HCC)    -  1       Follow-ups after your visit        Your next 10 appointments already scheduled     Jun 21, 2017   Procedure with Austin Wallace MD   [facility] ([facility])    [address] [address]   [phone]           The medical center is located at [address] (between [address] in [place], four miles north of [place]).            Jun 22, 2017  9:00 AM CDT   Diabetic Education with [address], RD   [facility]    [address] [place] [address]   [phone]            Jul 06, 2017  1:00 PM CDT   Diabetic Education with [address]ne [address], RD   [facility]    [address] [address]   [phone]            Aug 11, 2017  9:00 AM CDT   LAB with PI LAB   [facility] ([facility])    [address] [place] [id number] [phone]           Patient must bring picture ID.  Patient should be prepared to give a urine specimen  Please do not eat 10-12 hours before your appointment if you are coming in fasting for labs on lipids, cholesterol, or glucose (sugar).  Pregnant women should follow their Care Team instructions. Water with medications is okay. Do not drink coffee or other fluids.   If you have concerns about taking  your medications, please ask at office or if scheduling via [employer], send a message by clicking on Secure Messaging, Message Your Care Team.            Aug 15, 2017 10:40 AM CDT   SHORT with Dinorah Lorenzo,  DO   [facility]    [address]  [facility] [address]   [phone]              Future tests that were ordered for you today     Open Future Orders        Priority Expected Expires Ordered    **Glucose FUTURE anytime Routine 5/18/2017 5/18/2018 5/18/2017    C-peptide Routine  5/18/2018 5/18/2017            Who to contact     If you have questions or need follow up information about today's clinic visit or your schedule please contact [facility] directly at [phone].  Normal or non-critical lab and imaging results will be communicated to you by MyChart, letter or phone within 4 business days after the clinic has received the results. If you do not hear from us within 7 days, please contact the clinic through [employer] or phone. If you have a critical or abnormal lab result, we will notify you by phone as soon as possible.  Submit refill requests through [employer] or call your pharmacy and they will forward the refill request to us. Please allow 3 business days for your refill to be completed.          Additional Information About Your Visit        MyChart Information     [employer] gives you secure access to your electronic health record. If you see a primary care provider, you can also send messages to your care team and make appointments. If you have questions, please call your primary care clinic.  If you do not have a primary care provider, please call [phone] and they will assist you.        Care EveryWhere ID     This is your Care EveryWhere ID. This could be used by other organizations to access your [place] medical records  [id number]         Blood Pressure from Last 3 Encounters:   05/16/17 110/72   05/09/17 103/63   10/12/16 116/76    Weight from Last 3 Encounters:   05/16/17 69.9 kg (154 lb)   05/09/17 70.8 kg (156 lb)   10/12/16 70.6 kg (155 lb 9.6 oz)              Today, you had the following     No orders found for display          Today's Medication Changes          These changes are accurate as of: 5/19/17 11:42 AM.  If you have any questions, ask your nurse or doctor.               These medicines have changed or have updated prescriptions.        Dose/Directions    atorvastatin 10 MG tablet   Commonly known as:  LIPITOR   This may have changed:    - how much to take  - how to take this  - when to take this  - additional instructions   Used for:  Coronary artery disease due to lipid rich plaque        Take 5mg (one-half tablet) twice a week   Quantity:  30 tablet   Refills:  3       * blood glucose monitoring test strip   Commonly known as:  no brand specified   This may have changed:  Another medication with the same name was added. Make sure you understand how and when to take each.   Used for:  Type I (juvenile type) diabetes mellitus with other specified manifestations, not stated as uncontrolled        Use as directed, up to 6 times a day. One Touch test strips   Quantity:  500 strip   Refills:  2       * blood glucose monitoring test strip   Commonly known as:  ONE TOUCH VERIO IQ   This may have changed:  You were already taking a medication with the same name, and this prescription was added. Make sure you understand how and when to take each.   Used for:  Type 1 diabetes mellitus with hyperglycemia (H)        Use to test blood sugar 6 times daily or as directed.   Quantity:  500 strip   Refills:  3       estradiol 0.5 MG tablet   Commonly known as:  ESTRACE   This may have changed:    - how much to take  - how to take this  - when to take this  - additional instructions   Used for:  Atrophic vaginitis        1 tab vaginallly twice a week   Quantity:  24 tablet   Refills:  3       * Notice:  This list has 2 medication(s) that are the same as other medications prescribed for you. Read the directions carefully, and ask your doctor or other care provider to review them with you.         Where to get your medicines      These  medications were sent to [id number] - [place], [place] - [address]   [address] , \[facility] [place] [facility]\"" [id number]     Phone:  [phone]     blood glucose monitoring test strip                Primary Care Provider Office Phone # Fax #    Dinorah Lorenzo [phone]       [facility] [address] [address]        Thank you!     Thank you for choosing [facility]  for your care. Our goal is always to provide you with excellent care. Hearing back from our patients is one way we can continue to improve our services. Please take a few minutes to complete the written survey that you may receive in the mail after your visit with us. Thank you!             Your Updated Medication List - Protect others around you: Learn how to safely use, store and throw away your medicines at www.disposemymeds.org.          This list is accurate as of: 5/19/17 11:42 AM.  Always use your most recent med list.                   Brand Name Dispense Instructions for use    amitriptyline 10 MG tablet    ELAVIL    90 tablet    Take 1 tablet (10 mg) by mouth At Bedtime       aspirin 81 MG tablet      1 TABLET DAILY       atorvastatin 10 MG tablet    LIPITOR    30 tablet    Take 5mg (one-half tablet) twice a week       BD ULTRA FINE PEN NEEDLES     1 Box    Inject 1 each Subcutaneous 6 times daily       * blood glucose monitoring test strip    no brand specified    500 strip    Use as directed, up to 6 times a day. One Touch test strips       * blood glucose monitoring test strip    ONE TOUCH VERIO IQ    500 strip    Use to test blood sugar 6 times daily or as directed.       cholecalciferol 1000 UNIT tablet    vitamin D    1 tablet    Take 1 tablet by mouth daily.       CINNAMON PO      Take 500 mg by mouth daily       CRANBERRY PO      Take 1 capsule by mouth daily       diazepam 2 MG tablet    VALIUM    30 tablet    Take 1 tablet (2 mg) by mouth nightly as needed for  anxiety or sleep       estradiol 0.5 MG tablet    ESTRACE    24 tablet    1 tab vaginallly twice a week       glucagon 1 MG kit    GLUCAGON EMERGENCY    1 mg    Inject 1 mg Subcutaneous once for 1 dose       insulin aspart 100 UNITS/ML injection    NovoLOG    3 Month    1 unit per 12 grams of carb at meals, sliding scale: 150-190 +1, 191-230 +2, 231-270 +3, 271-310 +4, 311-350 +5, 351-390 +6, 391-430 +7, 431-470 +8       insulin glargine 100 UNIT/ML injection    LANTUS SOLOSTAR    9 mL    Inject 10 Units Subcutaneous 2 times daily       losartan 25 MG tablet    COZAAR    45 tablet    Take 0.5 tablets (12.5 mg) by mouth daily       magnesium 250 MG tablet      Take 1 tablet by mouth daily.       * methylphenidate 20 MG tablet    RITALIN    30 tablet    Take 1/2-1 tablet by mouth in the morning.       * methylphenidate 20 MG tablet   Start taking on:  6/9/2017    RITALIN    30 tablet    Take 0.5-1 tablets (10-20 mg) by mouth daily       metoprolol 25 MG 24 hr tablet    TOPROL-XL    90 tablet    Take 1 tablet (25 mg) by mouth daily       nitroglycerin 0.4 MG sublingual tablet    NITROSTAT    25 tablet    Place 1 tablet (0.4 mg) under the tongue every 5 minutes as needed for chest pain       OMEGA-3 FATTY ACIDS PO      Take 500 mg by mouth daily       omeprazole 20 MG CR capsule    priLOSEC    90 capsule    Take 1 capsule (20 mg) by mouth daily       Zinc 15 MG Caps      Take 1 capsule by mouth daily       * Notice:  This list has 4 medication(s) that are the same as other medications prescribed for you. Read the directions carefully, and ask your doctor or other care provider to review them with you.

## 2017-05-22 DIAGNOSIS — R80.9 TYPE 1 DIABETES MELLITUS WITH MICROALBUMINURIA (H): Chronic | ICD-10-CM

## 2017-05-22 DIAGNOSIS — E10.29 TYPE 1 DIABETES MELLITUS WITH MICROALBUMINURIA (H): Chronic | ICD-10-CM

## 2017-05-22 PROCEDURE — 36415 COLL VENOUS BLD VENIPUNCTURE: CPT | Performed by: INTERNAL MEDICINE

## 2017-05-22 PROCEDURE — 82947 ASSAY GLUCOSE BLOOD QUANT: CPT | Performed by: INTERNAL MEDICINE

## 2017-05-22 PROCEDURE — 84681 ASSAY OF C-PEPTIDE: CPT | Performed by: INTERNAL MEDICINE

## 2017-05-23 LAB — GLUCOSE SERPL-MCNC: 161 MG/DL (ref 70–99)

## 2017-05-24 LAB — C PEPTIDE SERPL-MCNC: <0.1 NG/ML (ref 0.9–6.9)

## 2017-06-21 ENCOUNTER — ANESTHESIA EVENT (OUTPATIENT)
Dept: GASTROENTEROLOGY | Facility: CLINIC | Age: 52
End: 2017-06-21
Payer: MEDICARE

## 2017-06-21 NOTE — ANESTHESIA PREPROCEDURE EVALUATION
Anesthesia Evaluation     . Pt has had prior anesthetic.     History of anesthetic complications   - PONV        ROS/MED HX    ENT/Pulmonary:  - neg pulmonary ROS     Neurologic:     (+)other neuro bilat occipital neuralgia , idiopathic hypersomnia with long sleep time     Cardiovascular: Comment: GATED MYOCARDIAL PERFUSION SCINTIGRAPHY WITH INTRAVENOUS PHARMACOLOGIC  VASODILATATION LEXISCAN -ONE DAY STUDY      12/30/2013 1:19 PM  LUPILLO PETERS  48 years  Female  1965.     Indication/Clinical History: Coronary artery disease     Impression  1.  Myocardial perfusion imaging using single isotope technique  demonstrated a small perfusion defect of mild severity involving the  mid anterior wall which is mildly reversible. These findings may be  consistent with mild ischemia in the ramus/diagonal territory.  2. Gated images demonstrated normal left ventricular wall motion.  The  left ventricular systolic function is 58% at rest and 71% on the post  stress images.  3. Compared to the prior study from 2/19/2008, the findings appear  similar, since mild reversibility in the anterior wall is also  described on that study .     Procedure  Pharmacologic stress testing was performed with Lexiscan at a rate of  0.08 mg/ml rapid bolus injection, for 15 seconds, 0.4 mg/5ml  intravenously. Low-level exercise was not performed along with the  vasodilator infusion.  Myocardial perfusion imaging was performed at rest, approximately 45  minutes after the injection intravenously of 7.9 mCi of Tc-99m  Myoview. At peak pharmacologic effect, 10-20 seconds after Lexiscan,   the patient was injected intravenously with 25.0 mCi of  Tc-99m  Myoview. The post-stress tomographic imaging was performed  approximately 60 minutes after stress.        EKG Findings  These are reported separately.     Tomographic Findings  Overall, the study quality is fair . On the stress images, there is a  small perfusion defect of mild severity involving  the mid anterior  wall. On the rest images,perfusion is normal . Gated images  demonstrated normal left ventricular wall motion. The left ventricular  ejection fraction was calculated to be 58% at rest and 71% on the  post-rest images. TID was absent.     VALERIO ROMO MD        (+) hypertension-Peripheral Vascular Disease-CAD, --. : . . . :. .       METS/Exercise Tolerance:  >4 METS   Hematologic:  - neg hematologic  ROS       Musculoskeletal:   (+) , , other musculoskeletal- fibromyalgia, cervicalgia, spondylosis , coccydynia      GI/Hepatic:     (+) GERD Other GI/Hepatic diabetic gastroparesis      Renal/Genitourinary:     (+) Other Renal/ Genitourinary, recurrent UTI      Endo:     (+) type I DM, Diabetic complications: neuropathy retinopathy, thyroid problem .      Psychiatric:     (+) psychiatric history depression and anxiety      Infectious Disease:  - neg infectious disease ROS       Malignancy:      - no malignancy   Other: Comment: scleroderma                    Physical Exam  Normal systems: cardiovascular, pulmonary and dental    Airway   Mallampati: II  TM distance: >3 FB  Neck ROM: full    Dental     Cardiovascular   Rhythm and rate: regular and normal      Pulmonary    breath sounds clear to auscultation                    Anesthesia Plan      History & Physical Review  History and physical reviewed and following examination; no interval change.    ASA Status:  3 .    NPO Status:  > 4 hours    Plan for General with Propofol induction. Maintenance will be TIVA.           Postoperative Care      Consents  Anesthetic plan, risks, benefits and alternatives discussed with:  Patient..                          .

## 2017-06-23 ENCOUNTER — SURGERY (OUTPATIENT)
Age: 52
End: 2017-06-23

## 2017-06-23 ENCOUNTER — HOSPITAL ENCOUNTER (OUTPATIENT)
Facility: CLINIC | Age: 52
Discharge: HOME | End: 2017-06-23
Attending: SURGERY | Admitting: SURGERY
Payer: MEDICARE

## 2017-06-23 ENCOUNTER — ANESTHESIA (OUTPATIENT)
Dept: GASTROENTEROLOGY | Facility: CLINIC | Age: 52
End: 2017-06-23
Payer: MEDICARE

## 2017-06-23 VITALS
WEIGHT: 150 LBS | SYSTOLIC BLOOD PRESSURE: 118 MMHG | RESPIRATION RATE: 16 BRPM | BODY MASS INDEX: 25.61 KG/M2 | TEMPERATURE: 98 F | DIASTOLIC BLOOD PRESSURE: 70 MMHG | HEIGHT: 64 IN | OXYGEN SATURATION: 100 %

## 2017-06-23 LAB — COLONOSCOPY: NORMAL

## 2017-06-23 PROCEDURE — G0121 COLON CA SCRN NOT HI RSK IND: HCPCS | Performed by: SURGERY

## 2017-06-23 PROCEDURE — 25000125 ZZHC RX 250: Performed by: SURGERY

## 2017-06-23 PROCEDURE — 25000125 ZZHC RX 250: Performed by: NURSE ANESTHETIST, CERTIFIED REGISTERED

## 2017-06-23 PROCEDURE — 45378 DIAGNOSTIC COLONOSCOPY: CPT | Performed by: SURGERY

## 2017-06-23 PROCEDURE — 37000008 ZZH ANESTHESIA TECHNICAL FEE, 1ST 30 MIN: Performed by: SURGERY

## 2017-06-23 PROCEDURE — 25000128 H RX IP 250 OP 636: Performed by: SURGERY

## 2017-06-23 RX ORDER — SODIUM CHLORIDE, SODIUM LACTATE, POTASSIUM CHLORIDE, CALCIUM CHLORIDE 600; 310; 30; 20 MG/100ML; MG/100ML; MG/100ML; MG/100ML
INJECTION, SOLUTION INTRAVENOUS CONTINUOUS
Status: DISCONTINUED | OUTPATIENT
Start: 2017-06-23 | End: 2017-06-23 | Stop reason: HOSPADM

## 2017-06-23 RX ORDER — LIDOCAINE 40 MG/G
CREAM TOPICAL
Status: DISCONTINUED | OUTPATIENT
Start: 2017-06-23 | End: 2017-06-23 | Stop reason: HOSPADM

## 2017-06-23 RX ORDER — PROPOFOL 10 MG/ML
INJECTION, EMULSION INTRAVENOUS PRN
Status: DISCONTINUED | OUTPATIENT
Start: 2017-06-23 | End: 2017-06-23

## 2017-06-23 RX ORDER — PROPOFOL 10 MG/ML
INJECTION, EMULSION INTRAVENOUS CONTINUOUS PRN
Status: DISCONTINUED | OUTPATIENT
Start: 2017-06-23 | End: 2017-06-23

## 2017-06-23 RX ORDER — ONDANSETRON 2 MG/ML
4 INJECTION INTRAMUSCULAR; INTRAVENOUS
Status: DISCONTINUED | OUTPATIENT
Start: 2017-06-23 | End: 2017-06-23 | Stop reason: HOSPADM

## 2017-06-23 RX ADMIN — PROPOFOL 200 MCG/KG/MIN: 10 INJECTION, EMULSION INTRAVENOUS at 09:22

## 2017-06-23 RX ADMIN — SODIUM CHLORIDE, POTASSIUM CHLORIDE, SODIUM LACTATE AND CALCIUM CHLORIDE: 600; 310; 30; 20 INJECTION, SOLUTION INTRAVENOUS at 08:33

## 2017-06-23 RX ADMIN — LIDOCAINE HYDROCHLORIDE 40 MG: 10 INJECTION, SOLUTION EPIDURAL; INFILTRATION; INTRACAUDAL; PERINEURAL at 09:22

## 2017-06-23 RX ADMIN — PROPOFOL 100 MG: 10 INJECTION, EMULSION INTRAVENOUS at 09:22

## 2017-06-23 RX ADMIN — LIDOCAINE HYDROCHLORIDE 1 ML: 10 INJECTION, SOLUTION EPIDURAL; INFILTRATION; INTRACAUDAL; PERINEURAL at 08:34

## 2017-06-23 NOTE — ANESTHESIA POSTPROCEDURE EVALUATION
Patient: Glory Birmingham    Procedure(s):  Colonoscopy   - Wound Class: II-Clean Contaminated    Diagnosis:screening  Diagnosis Additional Information: No value filed.    Anesthesia Type:  General    Note:  Anesthesia Post Evaluation    Patient location during evaluation: Phase 2 and Bedside  Patient participation: Able to fully participate in evaluation  Level of consciousness: awake and alert  Pain management: adequate  Airway patency: patent  Cardiovascular status: acceptable  Respiratory status: acceptable  Hydration status: acceptable  PONV: none     Anesthetic complications: None          Last vitals:  Vitals:    06/23/17 0823   BP: 132/73   Resp: 16   Temp: 36.7  C (98  F)   SpO2: 100%         Electronically Signed By: Jasson Chapman CRNA, APRN CRNA  June 23, 2017  9:44 AM

## 2017-06-23 NOTE — ANESTHESIA CARE TRANSFER NOTE
Patient: Glory Birmingham    Procedure(s):  Colonoscopy   - Wound Class: II-Clean Contaminated    Diagnosis: screening  Diagnosis Additional Information: No value filed.    Anesthesia Type:   General     Note:  Airway :Nasal Cannula  Patient transferred to:Phase II        Vitals: (Last set prior to Anesthesia Care Transfer)    CRNA VITALS  6/23/2017 0914 - 6/23/2017 0944      6/23/2017             Pulse: 79    Ht Rate: 72    SpO2: 100 %                Electronically Signed By: Jasson Chapman CRNA, APRN CRNA  June 23, 2017  9:44 AM

## 2017-06-23 NOTE — H&P
[facility]  GI Pre-Procedure History & Physical      Name: Glory Birmingham MRN#: [id number]   Age: 51 year old YOB: 1965     Date of Procedure: 6/23/2017    Primary care provider: Dinorah Lorenzo      Reason for Procedure:   Screening (V76.51)    Problem List:     Patient Active Problem List   Diagnosis     Type 1 diabetes mellitus with hyperglycemia (HCC)     Gastroesophageal reflux disease without esophagitis     Benign essential hypertension     Amenorrhea     Thyroid nodules     Scleredema of [place] (H)     Coronary artery disease involving native coronary artery of native heart with angina pectoris with documented spasm (H)     CARDIOVASCULAR SCREENING; LDL GOAL LESS THAN 100     Idiopathic hypersomnia with long sleep time     Type 1 diabetes mellitus with diabetic neuropathy (H)     Mild major depression (H)     Fibromyalgia     Generalized anxiety disorder     History of bilateral saline breast implants     Subclinical hypothyroidism     Hyperlipidemia LDL goal <70     Senile cataract     Organ transplant candidate     Recurrent UTI     Proliferative diabetic retinopathy associated with type 1 diabetes mellitus, macular edema presence unspecified (H)     Diabetic gastroparesis (H)     Coccydynia     Atrophic vaginitis     Hypoglycemia unawareness in type 1 diabetes mellitus (H)     Type 1 diabetes mellitus with proliferative retinopathy (H)     Cervicalgia     Bilateral occipital neuralgia     Lumbosacral spondylosis without myelopathy     Type 1 diabetes mellitus with microalbuminuria (H)       Current Outpatient Medications:      Current Outpatient Rx   Medication Sig Dispense Refill     [DISCONTINUED] BD ULTRA FINE PEN NEEDLES Inject 1 each Subcutaneous 3 times daily. 1 Box 11        Allergies:      Allergies   Allergen Reactions     Hmg-Coa-R Inhibitors Muscle Pain (Myalgia) and Cramps     Ibuprofen-Diphenhydramine Cit GI Disturbance     Severe gastritis     Lisinopril Cough      Choking spells       Prednisone GI Disturbance     Severe gastritis     Pregabalin Cramps        History:   Medical:  Past Medical History:   Diagnosis Date     Background diabetic retinopathy(362.01)     s/p photocoagulation     CAD (coronary artery disease) 4/21/2010 1/2014 She is known to have an occluded branch of her ramus with mild disease in the remainder of her coronary system based on angiography in 2008, along with repeat angiography in 2012. She has a history of stable angina which improved with Toprol-XL and Ranexa therapy.  Equivocal stress nuclear 5/08, breast attenuation vs. Small ischemic area.  Angiogram to clarify showed occluded ramus intermed     Coronary artery disease      Depression      Dermatologic disease     Sclerederma adultorum, biopsy proven     Esophageal reflux      Fibromyalgia 2012     Generalized anxiety disorder 2/17/2014     History of bilateral saline breast implants 2/17/2014 2000 All breast tissue excised due to severe fibrocystic disease, told she does not need annual mammogram      Hypertension goal BP (blood pressure) < 140/90 1/31/2006    Cardiologist is Dr. Mcintyre at [facility]      Idiopathic hypersomnia with long sleep time      Mild major depression (H) 8/29/2012     Multinodular goiter      Pain in limb 2/28/2012     Peripheral neuropathy (H)     EMG 2010 ([facility]) and 2013 ([place]) consistent with diabetic neuropathy     Peripheral vascular disease (H)      PONV (postoperative nausea and vomiting)      Proliferative diabetic retinopathy 10/26/2011     Thyroid condition 1/18/2012 4/2011: Stable multinodular thyroid gland from 11/18/09.      Thyroid nodules 12/1/2009    Followed by endocrine - stable Rec continued observation See scanned documents  Problem list name updated by automated process. Provider to review and confirm     Type I (juvenile type) diabetes mellitus with other specified manifestations, not stated as uncontrolled 1976    retinopathy, neuropathy,  "nephropathy; on pancreas transplant list     Unspecified essential hypertension      Surgical:  Past Surgical History:   Procedure Laterality Date     BENCH PANCREAS  12/10/2012    Procedure: BENCH PANCREAS;;  Surgeon: Soto Mills MD;  Location: [facility] OR     CYSTECTOMY PILONIDAL N/A 2016    Procedure: CYSTECTOMY PILONIDAL;  Surgeon: Austin Wallace MD;  Location: [facility] OR     LAPAROSCOPY PROCEDURE UNLISTED      diagnostic     MASTECTOMY, SIMPLE RT/LT/RASHAAD      bilateral mastectomy, saline implants--fibrocystic disease--abnormal mammo-biopsies, etc--     MUSCLE BIOPSY      back of neck,  path found tissue related to diabetes     PHACOEMULSIFICATION WITH STANDARD INTRAOCULAR LENS IMPLANT  2014    Procedure: PHACOEMULSIFICATION WITH STANDARD INTRAOCULAR LENS IMPLANT;  Surgeon: Roge Avendano MD;  Location: [facility] OR     PHACOEMULSIFICATION WITH STANDARD INTRAOCULAR LENS IMPLANT Right 2016    Procedure: PHACOEMULSIFICATION WITH STANDARD INTRAOCULAR LENS IMPLANT;  Surgeon: Roge Avendano MD;  Location: [facility] OR     SURGICAL HISTORY OF -       vitrectomy, bilateral     SURGICAL HISTORY OF -       c/section x 3, BTL     Family:  Family History   Problem Relation Age of Onset     GASTROINTESTINAL DISEASE Mother      Thyroid Disease Mother      Arthritis Mother      Cardiomyopathy Mother      CANCER Father      lung, spread to bone and brain     HEART DISEASE Father      Arthritis Father      DIABETES Sister           HEART DISEASE Sister      Thyroid Disease Sister      C.A.D. Sister      Obesity Daughter      Allergies Son      Depression Sister      Thyroid Disease Sister      Thyroid Disease Sister      Alcohol/Drug Brother      Social:  Social History   Substance Use Topics     Smoking status: Never Smoker     Smokeless tobacco: Never Used     Alcohol use No       Physical Exam:   Vital Signs:  /73  Temp 98  F (36.7  C) (Oral)  Resp 16  Ht 1.626 m (5' 4\")  Wt 68 kg " (150 lb)  SpO2 100%  Breastfeeding? Unknown  BMI 25.75 kg/m2  Airway assessment:  Patient is able to open mouth wide  Patient is able to stick out tongue     Lungs:  No increased work of breathing, good air exchange, clear to auscultation bilaterally, no crackles or wheezing.   Cardiovascular:  Normal- regular rate and rhythm, normal S1 - S2, no S3 or S4, and no murmur noted.  Gastrointestinal:  Abdomen soft, non-tender.  BS normal. No masses, organomegaly.    Assessment:   Appropriately NPO.  No significant changes since H&P.    Plan:   Moderate (conscious) sedation     General and specific risks of the procedure of the procedure including pain, bleeding, and perforation were discussed. Possibility of missing lesions discussed. Prep and recovery were discussed. She asked appropriate questions and provided consent.      Patient's active problems diagnostically and therapeutically optimized for the planned procedure. Risks, benefits, alternatives to sedation and blood explained and consent obtained.  Risks, benefits, alternatives to procedure explained and consent obtained.    I have reviewed the history and physical, lab finding(s), diagnostic data, medications, and the plan for sedation.  I have determined this patient to be an appropriate candidate for the planned sedation/procedure and have reassessed the patient immediately prior to sedation/procedure.    Austin Wallace MD

## 2017-06-23 NOTE — PROGRESS NOTES
Pt. Checked her Glucose now; 286. States she gave  herself her insulin now. [place]. Bread and fluids well.

## 2017-07-05 ENCOUNTER — OFFICE VISIT (OUTPATIENT)
Dept: FAMILY MEDICINE | Facility: CLINIC | Age: 52
End: 2017-07-05
Payer: MEDICARE

## 2017-07-05 VITALS
WEIGHT: 158 LBS | DIASTOLIC BLOOD PRESSURE: 64 MMHG | TEMPERATURE: 97.4 F | HEART RATE: 84 BPM | BODY MASS INDEX: 27.12 KG/M2 | SYSTOLIC BLOOD PRESSURE: 108 MMHG | RESPIRATION RATE: 16 BRPM | OXYGEN SATURATION: 98 %

## 2017-07-05 DIAGNOSIS — R51.9 RIGHT FACIAL PAIN: Primary | ICD-10-CM

## 2017-07-05 DIAGNOSIS — E10.649 HYPOGLYCEMIA UNAWARENESS IN TYPE 1 DIABETES MELLITUS (H): Chronic | ICD-10-CM

## 2017-07-05 DIAGNOSIS — I25.111 CORONARY ARTERY DISEASE INVOLVING NATIVE CORONARY ARTERY OF NATIVE HEART WITH ANGINA PECTORIS WITH DOCUMENTED SPASM (H): Chronic | ICD-10-CM

## 2017-07-05 PROCEDURE — 99214 OFFICE O/P EST MOD 30 MIN: CPT | Performed by: FAMILY MEDICINE

## 2017-07-05 RX ORDER — GABAPENTIN 100 MG/1
100 CAPSULE ORAL 3 TIMES DAILY
Qty: 90 CAPSULE | Refills: 1 | Status: SHIPPED | OUTPATIENT
Start: 2017-07-05 | End: 2017-08-15

## 2017-07-05 NOTE — PATIENT INSTRUCTIONS
Trigeminal Neuralgia  You have trigeminal neuralgia. This is pain caused by irritation of the trigeminal nerve on your face. Symptoms include sudden, sharp pain in your head or face. It may feel like an electric shock. It can last for several seconds or minutes. It usually happens on only 1 side of your face. Pain may be triggered by things like moving your jaw or a touch on the skin of your face. The pain may be caused by something irritating the trigeminal nerve, such as a blood vessel pressing against it. But the exact cause of this problem often isn t known. Although it can be quite painful, the condition isn t dangerous.  Trigeminal neuralgia is often treated with medicines. These include anti-seizure medicines or antidepressants. Certain other treatments may also help. In some cases, you may need surgery.    Home care  Your healthcare provider may prescribe medicines to help relieve and prevent pain. Take all medicines as directed. Please note that it may take several changes in dose and medicines before the right combination is found that controls the pain.  General care:    Plan to rest at home today.    Avoid any specific activities that seem to trigger the pain.    Over the next few weeks, keep a pain diary. Write down when your symptoms happen and how they feel. Certain activities such as touching your face, chewing, talking, or brushing your teeth may bring on the pain. Cold air can also trigger the pain. Make sure you write down any triggers and discuss these with your healthcare provider. This will help guide treatment.  Follow-up care  Follow up with your healthcare provider, or as advised. If you were referred to a neurologist, be sure to make an appointment.  For more information on your condition, visit:    [employer] www.fpa-support.org  When to seek medical advice  Call your healthcare provider right away if any of these occur:    Fever of 100.4 F (38 C) or higher, or as  advised    Headache with very stiff neck    You aren t able to keep liquids down (repeated vomiting)    Extreme drowsiness or confusion    Dizziness or fainting    A new feeling of weakness or numbness or tingling in your arm, leg, or face    Difficulty speaking or seeing  Date Last Reviewed: 8/1/2016 2000-2017 [employer]. [address] [place] [address]. All rights reserved. This information is not intended as a substitute for professional medical care. Always follow your healthcare professional's instructions.        Patient Education    Gabapentin enacarbil Oral tablet, extended-release    Gabapentin Oral capsule    Gabapentin Oral solution    Gabapentin Oral tablet    Gabapentin Oral tablet, extended-release    Gabapentin Oral tablet, extended-release, Gabapentin Oral tablet, extended-release  Gabapentin Oral tablet  What is this medicine?  GABAPENTIN (GA ba pen tin) is used to control partial seizures in adults with epilepsy. It is also used to treat certain types of nerve pain.  This medicine may be used for other purposes; ask your health care provider or pharmacist if you have questions.  What should I tell my health care provider before I take this medicine?  They need to know if you have any of these conditions:    kidney disease    suicidal thoughts, plans, or attempt; a previous suicide attempt by you or a family member    an unusual or allergic reaction to gabapentin, other medicines, foods, dyes, or preservatives    pregnant or trying to get pregnant    breast-feeding  How should I use this medicine?  Take this medicine by mouth with a glass of water. Follow the directions on the prescription label. You can take it with or without food. If it upsets your stomach, take it with food.Take your medicine at regular intervals. Do not take it more often than directed. Do not stop taking except on your doctor's advice.  If you are directed to break the 600 or 800 mg tablets in half  as part of your dose, the extra half tablet should be used for the next dose. If you have not used the extra half tablet within 28 days, it should be thrown away.  A special MedGuide will be given to you by the pharmacist with each prescription and refill. Be sure to read this information carefully each time.  Talk to your pediatrician regarding the use of this medicine in children. Special care may be needed.  Overdosage: If you think you have taken too much of this medicine contact a poison control center or emergency room at once.  NOTE: This medicine is only for you. Do not share this medicine with others.  What if I miss a dose?  If you miss a dose, take it as soon as you can. If it is almost time for your next dose, take only that dose. Do not take double or extra doses.  What may interact with this medicine?  Do not take this medicine with any of the following medications:    other gabapentin products  This medicine may also interact with the following medications:    alcohol    antacids    antihistamines for allergy, cough and cold    certain medicines for anxiety or sleep    certain medicines for depression or psychotic disturbances    homatropine; hydrocodone    naproxen    narcotic medicines (opiates) for pain    phenothiazines like chlorpromazine, mesoridazine, prochlorperazine, thioridazine  This list may not describe all possible interactions. Give your health care provider a list of all the medicines, herbs, non-prescription drugs, or dietary supplements you use. Also tell them if you smoke, drink alcohol, or use illegal drugs. Some items may interact with your medicine.  What should I watch for while using this medicine?  Visit your doctor or health care professional for regular checks on your progress. You may want to keep a record at home of how you feel your condition is responding to treatment. You may want to share this information with your doctor or health care professional at each visit. You  should contact your doctor or health care professional if your seizures get worse or if you have any new types of seizures. Do not stop taking this medicine or any of your seizure medicines unless instructed by your doctor or health care professional. Stopping your medicine suddenly can increase your seizures or their severity.  Wear a medical identification bracelet or chain if you are taking this medicine for seizures, and carry a card that lists all your medications.  You may get drowsy, dizzy, or have blurred vision. Do not drive, use machinery, or do anything that needs mental alertness until you know how this medicine affects you. To reduce dizzy or fainting spells, do not sit or stand up quickly, especially if you are an older patient. Alcohol can increase drowsiness and dizziness. Avoid alcoholic drinks.  Your mouth may get dry. Chewing sugarless gum or sucking hard candy, and drinking plenty of water will help.  The use of this medicine may increase the chance of suicidal thoughts or actions. Pay special attention to how you are responding while on this medicine. Any worsening of mood, or thoughts of suicide or dying should be reported to your health care professional right away.  Women who become pregnant while using this medicine may enroll in the [place] Antiepileptic Drug Pregnancy Registry by calling 1-201.985.5237. This registry collects information about the safety of antiepileptic drug use during pregnancy.  What side effects may I notice from receiving this medicine?  Side effects that you should report to your doctor or health care professional as soon as possible:    allergic reactions like skin rash, itching or hives, swelling of the face, lips, or tongue    worsening of mood, thoughts or actions of suicide or dying  Side effects that usually do not require medical attention (report to your doctor or health care professional if they continue or are  bothersome):    constipation    difficulty walking or controlling muscle movements    dizziness    nausea    slurred speech    tiredness    tremors    weight gain  This list may not describe all possible side effects. Call your doctor for medical advice about side effects. You may report side effects to FDA at 1-272-FDA-7866.  Where should I keep my medicine?  Keep out of reach of children.  Store at room temperature between 15 and 30 degrees C (59 and 86 degrees F). Throw away any unused medicine after the expiration date.  NOTE:This sheet is a summary. It may not cover all possible information. If you have questions about this medicine, talk to your doctor, pharmacist, or health care provider. Copyright  2016 [employer]

## 2017-07-05 NOTE — PROGRESS NOTES
SUBJECTIVE:                                                    Glory Birmingham is a 51 year old female who presents to clinic today for the following health issues:        Musculoskeletal problem/pain      Duration: 3 days    Description  Location: right side face and jaw pain    Intensity:  moderate    Accompanying signs and symptoms: radiation of pain from jaw up head and down and neck    History  Previous similar problem: YES- been diagnosed with TMJ previously  Previous evaluation:  none    Precipitating or alleviating factors:  Trauma or overuse: no   Aggravating factors include: yawning or chewing    Therapies tried and outcome: rest/inactivity, ice and NSAID - IBU- not helpful      Experiencing symptoms for about few years now, episodes sudden in nature, sharp in character, symptoms usually lasting about half to one hour. Followed dentist this morning and was told of normal dental exam.       Problem list and histories reviewed & adjusted, as indicated.  Additional history: as documented    Patient Active Problem List   Diagnosis     Type 1 diabetes mellitus with hyperglycemia (HCC)     Gastroesophageal reflux disease without esophagitis     Benign essential hypertension     Amenorrhea     Thyroid nodules     Scleredema of [place] (H)     Coronary artery disease involving native coronary artery of native heart with angina pectoris with documented spasm (H)     CARDIOVASCULAR SCREENING; LDL GOAL LESS THAN 100     Idiopathic hypersomnia with long sleep time     Type 1 diabetes mellitus with diabetic neuropathy (H)     Mild major depression (H)     Fibromyalgia     Generalized anxiety disorder     History of bilateral saline breast implants     Subclinical hypothyroidism     Hyperlipidemia LDL goal <70     Senile cataract     Organ transplant candidate     Recurrent UTI     Proliferative diabetic retinopathy associated with type 1 diabetes mellitus, macular edema presence unspecified     Diabetic gastroparesis  (H)     Coccydynia     Atrophic vaginitis     Hypoglycemia unawareness in type 1 diabetes mellitus (H)     Type 1 diabetes mellitus with proliferative retinopathy (H)     Cervicalgia     Bilateral occipital neuralgia     Lumbosacral spondylosis without myelopathy     Type 1 diabetes mellitus with microalbuminuria (H)     Past Surgical History:   Procedure Laterality Date     BENCH PANCREAS  12/10/2012    Procedure: BENCH PANCREAS;;  Surgeon: Soto Mills MD;  Location: [facility]     COLONOSCOPY N/A 2017    Procedure: COLONOSCOPY;  Colonoscopy  ;  Surgeon: Austin Wallace MD;  Location: [facility]     CYSTECTOMY PILONIDAL N/A 2016    Procedure: CYSTECTOMY PILONIDAL;  Surgeon: Austin Wallace MD;  Location: [facility]     LAPAROSCOPY PROCEDURE UNLISTED      diagnostic     MASTECTOMY, SIMPLE RT/LT/RASHAAD      bilateral mastectomy, saline implants--fibrocystic disease--abnormal mammo-biopsies, etc--     MUSCLE BIOPSY      back of neck,  path found tissue related to diabetes     PHACOEMULSIFICATION WITH STANDARD INTRAOCULAR LENS IMPLANT  2014    Procedure: PHACOEMULSIFICATION WITH STANDARD INTRAOCULAR LENS IMPLANT;  Surgeon: Roge Avendano MD;  Location: [facility]     PHACOEMULSIFICATION WITH STANDARD INTRAOCULAR LENS IMPLANT Right 2016    Procedure: PHACOEMULSIFICATION WITH STANDARD INTRAOCULAR LENS IMPLANT;  Surgeon: Roge Avendano MD;  Location: [facility]     SURGICAL HISTORY OF -       vitrectomy, bilateral     SURGICAL HISTORY OF -       c/section x 3, BTL       Social History   Substance Use Topics     Smoking status: Never Smoker     Smokeless tobacco: Never Used     Alcohol use No     Family History   Problem Relation Age of Onset     GASTROINTESTINAL DISEASE Mother      Thyroid Disease Mother      Arthritis Mother      Cardiomyopathy Mother      CANCER Father      lung, spread to bone and brain     HEART DISEASE Father      Arthritis Father      DIABETES Sister            HEART DISEASE Sister      Thyroid Disease Sister      C.A.D. Sister      Obesity Daughter      Allergies Son      Depression Sister      Thyroid Disease Sister      Thyroid Disease Sister      Alcohol/Drug Brother          Current Outpatient Prescriptions   Medication Sig Dispense Refill     blood glucose monitoring (ONE TOUCH VERIO IQ) test strip Use to test blood sugar 6 times daily or as directed. 500 strip 3     CINNAMON PO Take 500 mg by mouth daily       CRANBERRY PO Take 1 capsule by mouth daily       insulin glargine (LANTUS SOLOSTAR) 100 UNIT/ML injection Inject 10 Units Subcutaneous 2 times daily 9 mL 11     methylphenidate (RITALIN) 20 MG tablet Take 1/2-1 tablet by mouth in the morning. 30 tablet 0     diazepam (VALIUM) 2 MG tablet Take 1 tablet (2 mg) by mouth nightly as needed for anxiety or sleep 30 tablet 5     methylphenidate (RITALIN) 20 MG tablet Take 0.5-1 tablets (10-20 mg) by mouth daily 30 tablet 0     losartan (COZAAR) 25 MG tablet Take 0.5 tablets (12.5 mg) by mouth daily 45 tablet 3     metoprolol (TOPROL-XL) 25 MG 24 hr tablet Take 1 tablet (25 mg) by mouth daily 90 tablet 2     estradiol (ESTRACE) 0.5 MG tablet 1 tab vaginallly twice a week (Patient taking differently: Place 1 mg vaginally twice a week Sunday and Wednesday) 24 tablet 3     atorvastatin (LIPITOR) 10 MG tablet Take 5mg (one-half tablet) twice a week (Patient taking differently: Take 5 mg by mouth twice a week On Mon and FriTake 5mg (one-half tablet) twice a week) 30 tablet 3     amitriptyline (ELAVIL) 10 MG tablet Take 1 tablet (10 mg) by mouth At Bedtime 90 tablet 3     omeprazole (PRILOSEC) 20 MG capsule Take 1 capsule (20 mg) by mouth daily 90 capsule 3     BD ULTRA FINE PEN NEEDLES Inject 1 each Subcutaneous 6 times daily 1 Box 3     OMEGA-3 FATTY ACIDS PO Take 500 mg by mouth daily        nitroglycerin (NITROSTAT) 0.4 MG SL tablet Place 1 tablet (0.4 mg) under the tongue every 5 minutes as needed for chest pain 25  tablet 3     blood glucose test strip Use as directed, up to 6 times a day. One Touch test strips 500 strip 2     insulin aspart (NOVOLOG VIAL) 100 UNITS/ML soln 1 unit per 12 grams of carb at meals, sliding scale: 150-190 +1, 191-230 +2, 231-270 +3, 271-310 +4, 311-350 +5, 351-390 +6, 391-430 +7, 431-470 +8 3 Month 3     magnesium 250 MG tablet Take 1 tablet by mouth daily.       Zinc 15 MG CAPS Take 1 capsule by mouth daily        cholecalciferol (VITAMIN D) 1000 UNIT tablet Take 1 tablet by mouth daily. 1 tablet 0     ASPIRIN 81 MG OR TABS 1 TABLET DAILY       glucagon (GLUCAGON EMERGENCY) 1 MG injection Inject 1 mg Subcutaneous once for 1 dose 1 mg 1     [DISCONTINUED] BD ULTRA FINE PEN NEEDLES Inject 1 each Subcutaneous 3 times daily. 1 Box 11     Allergies   Allergen Reactions     Hmg-Coa-R Inhibitors Muscle Pain (Myalgia) and Cramps     Ibuprofen-Diphenhydramine Cit GI Disturbance     Severe gastritis     Lisinopril Cough     Choking spells       Prednisone GI Disturbance     Severe gastritis     Pregabalin Cramps     Recent Labs   Lab Test  05/16/17   1610  05/09/17   1505  07/07/16   1335 06/28/16   06/25/15   0910   09/23/14   1345   04/08/14   1345  02/12/14   0916   11/12/12   0805   A1C   --   9.9*  9.9*  9.2*   < >   --    < >  9.6*   < >   --   9.8*   < >  9.5*   LDL   --    --    --   84   --   78   --    --    --    --   94   --   93   HDL   --    --    --    --    --   83   --    --    --    --   84   --   81   TRIG   --    --    --    --    --   72   --    --    --    --   114   --   107   ALT  29   --    --   33   --    --    --   35   < >   --   26   < >  42   CR  0.86   --   0.82  0.82   < >   --    --   0.87   < >   --   0.80   < >  0.72   GFRESTIMATED  69   --   74  >60   < >   --    --   69   < >   --   77   < >  87   GFRESTBLACK  83   --   89  >60   < >   --    --   83   < >   --   >90   < >  >90   POTASSIUM  3.9   --   4.3  4.6   --    --    --   4.6   < >   --   4.0   < >  4.6   TSH   --    6.32*   --    --    --    --    --    --    --   3.38  5.17*   --    --     < > = values in this interval not displayed.      BP Readings from Last 3 Encounters:   07/05/17 108/64   06/23/17 118/70   05/16/17 110/72    Wt Readings from Last 3 Encounters:   07/05/17 158 lb (71.7 kg)   06/23/17 150 lb (68 kg)   05/16/17 154 lb (69.9 kg)                  Labs reviewed in EPIC    Reviewed and updated as needed this visit by clinical staff  Tobacco  Allergies  Med Hx  Surg Hx  Fam Hx  Soc Hx      Reviewed and updated as needed this visit by Provider         ROS:  Constitutional, HEENT, cardiovascular, pulmonary, gi and gu systems are negative, except as otherwise noted.    OBJECTIVE:     /64 (BP Location: Right arm, Patient Position: Chair, Cuff Size: Adult Large)  Pulse 84  Temp 97.4  F (36.3  C) (Tympanic)  Resp 16  Wt 158 lb (71.7 kg)  SpO2 98%  BMI 27.12 kg/m2  Body mass index is 27.12 kg/(m^2).  GENERAL: healthy, alert and no distress  EYES: Eyes grossly normal to inspection, PERRL and conjunctivae and sclerae normal  NECK: no adenopathy, no asymmetry, masses, or scars and thyroid normal to palpation  RESP: lungs clear to auscultation - no rales, rhonchi or wheezes  CV: regular rate and rhythm, normal S1 S2, no S3 or S4, no murmur, click or rub, no peripheral edema and peripheral pulses strong  ABDOMEN: soft, nontender, no hepatosplenomegaly, no masses and bowel sounds normal  MS: no gross musculoskeletal defects noted, no edema   CNS: grossly intact, CNII-XII intact    ASSESSMENT/PLAN:           ICD-10-CM    1. Right facial pain R51 gabapentin (NEURONTIN) 100 MG capsule   2. Hypoglycemia unawareness in type 1 diabetes mellitus (H) E10.649    3. Coronary artery disease involving native coronary artery of native heart with angina pectoris with documented spasm (H) I25.111        51-year-old female presents with intermittent right facial pain which she is experiencing for about few years now.  Differentials are broad including but not limited to TMJ disorder and trigeminal neuralgia. Suspect trigeminal neuralgia as underlying etiology. Gabapentin described, common side effects discussed. She will also to schedule an appointment with her neurologist for discussing further management. Other medications reviewed and no changes made. Written information provided as below. Patient understood and in agreement with the above plan. All questions answered.      MEDICATIONS:   Orders Placed This Encounter   Medications     gabapentin (NEURONTIN) 100 MG capsule     Sig: Take 1 capsule (100 mg) by mouth 3 times daily     Dispense:  90 capsule     Refill:  1          - Continue other medications without change  Patient Instructions     Trigeminal Neuralgia  You have trigeminal neuralgia. This is pain caused by irritation of the trigeminal nerve on your face. Symptoms include sudden, sharp pain in your head or face. It may feel like an electric shock. It can last for several seconds or minutes. It usually happens on only 1 side of your face. Pain may be triggered by things like moving your jaw or a touch on the skin of your face. The pain may be caused by something irritating the trigeminal nerve, such as a blood vessel pressing against it. But the exact cause of this problem often isn t known. Although it can be quite painful, the condition isn t dangerous.  Trigeminal neuralgia is often treated with medicines. These include anti-seizure medicines or antidepressants. Certain other treatments may also help. In some cases, you may need surgery.    Home care  Your healthcare provider may prescribe medicines to help relieve and prevent pain. Take all medicines as directed. Please note that it may take several changes in dose and medicines before the right combination is found that controls the pain.  General care:    Plan to rest at home today.    Avoid any specific activities that seem to trigger the pain.    Over the  next few weeks, keep a pain diary. Write down when your symptoms happen and how they feel. Certain activities such as touching your face, chewing, talking, or brushing your teeth may bring on the pain. Cold air can also trigger the pain. Make sure you write down any triggers and discuss these with your healthcare provider. This will help guide treatment.  Follow-up care  Follow up with your healthcare provider, or as advised. If you were referred to a neurologist, be sure to make an appointment.  For more information on your condition, visit:    [employer] www.fpa-support.org  When to seek medical advice  Call your healthcare provider right away if any of these occur:    Fever of 100.4 F (38 C) or higher, or as advised    Headache with very stiff neck    You aren t able to keep liquids down (repeated vomiting)    Extreme drowsiness or confusion    Dizziness or fainting    A new feeling of weakness or numbness or tingling in your arm, leg, or face    Difficulty speaking or seeing  Date Last Reviewed: 8/1/2016 2000-2017 [employer]. [address] [place] [address]. All rights reserved. This information is not intended as a substitute for professional medical care. Always follow your healthcare professional's instructions.        Patient Education    Gabapentin enacarbil Oral tablet, extended-release    Gabapentin Oral capsule    Gabapentin Oral solution    Gabapentin Oral tablet    Gabapentin Oral tablet, extended-release    Gabapentin Oral tablet, extended-release, Gabapentin Oral tablet, extended-release  Gabapentin Oral tablet  What is this medicine?  GABAPENTIN (GA ba pen tin) is used to control partial seizures in adults with epilepsy. It is also used to treat certain types of nerve pain.  This medicine may be used for other purposes; ask your health care provider or pharmacist if you have questions.  What should I tell my health care provider before I take this  medicine?  They need to know if you have any of these conditions:    kidney disease    suicidal thoughts, plans, or attempt; a previous suicide attempt by you or a family member    an unusual or allergic reaction to gabapentin, other medicines, foods, dyes, or preservatives    pregnant or trying to get pregnant    breast-feeding  How should I use this medicine?  Take this medicine by mouth with a glass of water. Follow the directions on the prescription label. You can take it with or without food. If it upsets your stomach, take it with food.Take your medicine at regular intervals. Do not take it more often than directed. Do not stop taking except on your doctor's advice.  If you are directed to break the 600 or 800 mg tablets in half as part of your dose, the extra half tablet should be used for the next dose. If you have not used the extra half tablet within 28 days, it should be thrown away.  A special MedGuide will be given to you by the pharmacist with each prescription and refill. Be sure to read this information carefully each time.  Talk to your pediatrician regarding the use of this medicine in children. Special care may be needed.  Overdosage: If you think you have taken too much of this medicine contact a poison control center or emergency room at once.  NOTE: This medicine is only for you. Do not share this medicine with others.  What if I miss a dose?  If you miss a dose, take it as soon as you can. If it is almost time for your next dose, take only that dose. Do not take double or extra doses.  What may interact with this medicine?  Do not take this medicine with any of the following medications:    other gabapentin products  This medicine may also interact with the following medications:    alcohol    antacids    antihistamines for allergy, cough and cold    certain medicines for anxiety or sleep    certain medicines for depression or psychotic disturbances    homatropine;  hydrocodone    naproxen    narcotic medicines (opiates) for pain    phenothiazines like chlorpromazine, mesoridazine, prochlorperazine, thioridazine  This list may not describe all possible interactions. Give your health care provider a list of all the medicines, herbs, non-prescription drugs, or dietary supplements you use. Also tell them if you smoke, drink alcohol, or use illegal drugs. Some items may interact with your medicine.  What should I watch for while using this medicine?  Visit your doctor or health care professional for regular checks on your progress. You may want to keep a record at home of how you feel your condition is responding to treatment. You may want to share this information with your doctor or health care professional at each visit. You should contact your doctor or health care professional if your seizures get worse or if you have any new types of seizures. Do not stop taking this medicine or any of your seizure medicines unless instructed by your doctor or health care professional. Stopping your medicine suddenly can increase your seizures or their severity.  Wear a medical identification bracelet or chain if you are taking this medicine for seizures, and carry a card that lists all your medications.  You may get drowsy, dizzy, or have blurred vision. Do not drive, use machinery, or do anything that needs mental alertness until you know how this medicine affects you. To reduce dizzy or fainting spells, do not sit or stand up quickly, especially if you are an older patient. Alcohol can increase drowsiness and dizziness. Avoid alcoholic drinks.  Your mouth may get dry. Chewing sugarless gum or sucking hard candy, and drinking plenty of water will help.  The use of this medicine may increase the chance of suicidal thoughts or actions. Pay special attention to how you are responding while on this medicine. Any worsening of mood, or thoughts of suicide or dying should be reported to your  health care professional right away.  Women who become pregnant while using this medicine may enroll in the [place] Antiepileptic Drug Pregnancy Registry by calling 1-449.359.5122. This registry collects information about the safety of antiepileptic drug use during pregnancy.  What side effects may I notice from receiving this medicine?  Side effects that you should report to your doctor or health care professional as soon as possible:    allergic reactions like skin rash, itching or hives, swelling of the face, lips, or tongue    worsening of mood, thoughts or actions of suicide or dying  Side effects that usually do not require medical attention (report to your doctor or health care professional if they continue or are bothersome):    constipation    difficulty walking or controlling muscle movements    dizziness    nausea    slurred speech    tiredness    tremors    weight gain  This list may not describe all possible side effects. Call your doctor for medical advice about side effects. You may report side effects to FDA at 9-462-BIC-4042.  Where should I keep my medicine?  Keep out of reach of children.  Store at room temperature between 15 and 30 degrees C (59 and 86 degrees F). Throw away any unused medicine after the expiration date.  NOTE:This sheet is a summary. It may not cover all possible information. If you have questions about this medicine, talk to your doctor, pharmacist, or health care provider. Copyright  2016 [employer]            David Gudino MD  [facility]

## 2017-07-05 NOTE — MR AVS SNAPSHOT
After Visit Summary   7/5/2017    Glory Birmingham    MRN: [id number]           Patient Information     Date Of Birth          1965        Visit Information        Provider Department      7/5/2017 4:00 PM David Gudino MD [facility]        Today's Diagnoses     Right facial pain    -  1    Hypoglycemia unawareness in type 1 diabetes mellitus (H)        Coronary artery disease involving native coronary artery of native heart with angina pectoris with documented spasm (H)          Care Instructions      Trigeminal Neuralgia  You have trigeminal neuralgia. This is pain caused by irritation of the trigeminal nerve on your face. Symptoms include sudden, sharp pain in your head or face. It may feel like an electric shock. It can last for several seconds or minutes. It usually happens on only 1 side of your face. Pain may be triggered by things like moving your jaw or a touch on the skin of your face. The pain may be caused by something irritating the trigeminal nerve, such as a blood vessel pressing against it. But the exact cause of this problem often isn t known. Although it can be quite painful, the condition isn t dangerous.  Trigeminal neuralgia is often treated with medicines. These include anti-seizure medicines or antidepressants. Certain other treatments may also help. In some cases, you may need surgery.    Home care  Your healthcare provider may prescribe medicines to help relieve and prevent pain. Take all medicines as directed. Please note that it may take several changes in dose and medicines before the right combination is found that controls the pain.  General care:    Plan to rest at home today.    Avoid any specific activities that seem to trigger the pain.    Over the next few weeks, keep a pain diary. Write down when your symptoms happen and how they feel. Certain activities such as touching your face, chewing, talking, or brushing your teeth may bring on the pain.  Cold air can also trigger the pain. Make sure you write down any triggers and discuss these with your healthcare provider. This will help guide treatment.  Follow-up care  Follow up with your healthcare provider, or as advised. If you were referred to a neurologist, be sure to make an appointment.  For more information on your condition, visit:    F[employer] Association www.fpa-support.org  When to seek medical advice  Call your healthcare provider right away if any of these occur:    Fever of 100.4 F (38 C) or higher, or as advised    Headache with very stiff neck    You aren t able to keep liquids down (repeated vomiting)    Extreme drowsiness or confusion    Dizziness or fainting    A new feeling of weakness or numbness or tingling in your arm, leg, or face    Difficulty speaking or seeing  Date Last Reviewed: 8/1/2016 2000-2017 [employer]. [address] [place] [address]. All rights reserved. This information is not intended as a substitute for professional medical care. Always follow your healthcare professional's instructions.        Patient Education    Gabapentin enacarbil Oral tablet, extended-release    Gabapentin Oral capsule    Gabapentin Oral solution    Gabapentin Oral tablet    Gabapentin Oral tablet, extended-release    Gabapentin Oral tablet, extended-release, Gabapentin Oral tablet, extended-release  Gabapentin Oral tablet  What is this medicine?  GABAPENTIN (GA ba pen tin) is used to control partial seizures in adults with epilepsy. It is also used to treat certain types of nerve pain.  This medicine may be used for other purposes; ask your health care provider or pharmacist if you have questions.  What should I tell my health care provider before I take this medicine?  They need to know if you have any of these conditions:    kidney disease    suicidal thoughts, plans, or attempt; a previous suicide attempt by you or a family member    an unusual or allergic reaction  to gabapentin, other medicines, foods, dyes, or preservatives    pregnant or trying to get pregnant    breast-feeding  How should I use this medicine?  Take this medicine by mouth with a glass of water. Follow the directions on the prescription label. You can take it with or without food. If it upsets your stomach, take it with food.Take your medicine at regular intervals. Do not take it more often than directed. Do not stop taking except on your doctor's advice.  If you are directed to break the 600 or 800 mg tablets in half as part of your dose, the extra half tablet should be used for the next dose. If you have not used the extra half tablet within 28 days, it should be thrown away.  A special MedGuide will be given to you by the pharmacist with each prescription and refill. Be sure to read this information carefully each time.  Talk to your pediatrician regarding the use of this medicine in children. Special care may be needed.  Overdosage: If you think you have taken too much of this medicine contact a poison control center or emergency room at once.  NOTE: This medicine is only for you. Do not share this medicine with others.  What if I miss a dose?  If you miss a dose, take it as soon as you can. If it is almost time for your next dose, take only that dose. Do not take double or extra doses.  What may interact with this medicine?  Do not take this medicine with any of the following medications:    other gabapentin products  This medicine may also interact with the following medications:    alcohol    antacids    antihistamines for allergy, cough and cold    certain medicines for anxiety or sleep    certain medicines for depression or psychotic disturbances    homatropine; hydrocodone    naproxen    narcotic medicines (opiates) for pain    phenothiazines like chlorpromazine, mesoridazine, prochlorperazine, thioridazine  This list may not describe all possible interactions. Give your health care provider a list  of all the medicines, herbs, non-prescription drugs, or dietary supplements you use. Also tell them if you smoke, drink alcohol, or use illegal drugs. Some items may interact with your medicine.  What should I watch for while using this medicine?  Visit your doctor or health care professional for regular checks on your progress. You may want to keep a record at home of how you feel your condition is responding to treatment. You may want to share this information with your doctor or health care professional at each visit. You should contact your doctor or health care professional if your seizures get worse or if you have any new types of seizures. Do not stop taking this medicine or any of your seizure medicines unless instructed by your doctor or health care professional. Stopping your medicine suddenly can increase your seizures or their severity.  Wear a medical identification bracelet or chain if you are taking this medicine for seizures, and carry a card that lists all your medications.  You may get drowsy, dizzy, or have blurred vision. Do not drive, use machinery, or do anything that needs mental alertness until you know how this medicine affects you. To reduce dizzy or fainting spells, do not sit or stand up quickly, especially if you are an older patient. Alcohol can increase drowsiness and dizziness. Avoid alcoholic drinks.  Your mouth may get dry. Chewing sugarless gum or sucking hard candy, and drinking plenty of water will help.  The use of this medicine may increase the chance of suicidal thoughts or actions. Pay special attention to how you are responding while on this medicine. Any worsening of mood, or thoughts of suicide or dying should be reported to your health care professional right away.  Women who become pregnant while using this medicine may enroll in the [place] American Antiepileptic Drug Pregnancy Registry by calling 1-371.897.6035. This registry collects information about the safety of  antiepileptic drug use during pregnancy.  What side effects may I notice from receiving this medicine?  Side effects that you should report to your doctor or health care professional as soon as possible:    allergic reactions like skin rash, itching or hives, swelling of the face, lips, or tongue    worsening of mood, thoughts or actions of suicide or dying  Side effects that usually do not require medical attention (report to your doctor or health care professional if they continue or are bothersome):    constipation    difficulty walking or controlling muscle movements    dizziness    nausea    slurred speech    tiredness    tremors    weight gain  This list may not describe all possible side effects. Call your doctor for medical advice about side effects. You may report side effects to FDA at 5-534-YND-3371.  Where should I keep my medicine?  Keep out of reach of children.  Store at room temperature between 15 and 30 degrees C (59 and 86 degrees F). Throw away any unused medicine after the expiration date.  NOTE:This sheet is a summary. It may not cover all possible information. If you have questions about this medicine, talk to your doctor, pharmacist, or health care provider. Copyright  2016 [employer]                Follow-ups after your visit        Your next 10 appointments already scheduled     Jul 06, 2017  1:00 PM CDT   Diabetic Education with [address], [place]   [facility]    [address] [address]   [phone]            Aug 11, 2017  9:00 AM CDT   LAB with [facility]   [facility]    [address] [address]   [phone]           Patient must bring picture ID.  Patient should be prepared to give a urine specimen  Please do not eat 10-12 hours before your appointment if you are coming in fasting for labs on lipids, cholesterol, or glucose (sugar).  Pregnant women  should follow their Care Team instructions. Water with medications is okay. Do not drink coffee or other fluids.   If you have concerns about taking  your medications, please ask at office or if scheduling via [employer], send a message by clicking on Secure Messaging, Message Your Care Team.            Aug 15, 2017 10:40 AM CDT   SHORT with Dinorah Lorenzo, DO   [facility]    [address] [place] [address] [phone]              Who to contact     If you have questions or need follow up information about today's clinic visit or your schedule please contact [facility] directly at [phone].  Normal or non-critical lab and imaging results will be communicated to you by [employer], letter or phone within 4 business days after the clinic has received the results. If you do not hear from us within 7 days, please contact the clinic through [employer] or phone. If you have a critical or abnormal lab result, we will notify you by phone as soon as possible.  Submit refill requests through [employer] or call your pharmacy and they will forward the refill request to us. Please allow 3 business days for your refill to be completed.          Additional Information About Your Visit        [employer] Information     [employer] gives you secure access to your electronic health record. If you see a primary care provider, you can also send messages to your care team and make appointments. If you have questions, please call your primary care clinic.  If you do not have a primary care provider, please call [phone] and they will assist you.        Care EveryWhere ID     This is your Care EveryWhere ID. This could be used by other organizations to access your [facility] medical records  [id number]        Your Vitals Were     Pulse Temperature Respirations Pulse Oximetry BMI (Body Mass Index)       84 97.4  F (36.3  C) (Tympanic) 16 98% 27.12 kg/m2        Blood  Pressure from Last 3 Encounters:   07/05/17 108/64   06/23/17 118/70   05/16/17 110/72    Weight from Last 3 Encounters:   07/05/17 158 lb (71.7 kg)   06/23/17 150 lb (68 kg)   05/16/17 154 lb (69.9 kg)              Today, you had the following     No orders found for display         Today's Medication Changes          These changes are accurate as of: 7/5/17  4:16 PM.  If you have any questions, ask your nurse or doctor.               Start taking these medicines.        Dose/Directions    gabapentin 100 MG capsule   Commonly known as:  NEURONTIN   Used for:  Right facial pain   Started by:  David Gudino MD        Dose:  100 mg   Take 1 capsule (100 mg) by mouth 3 times daily   Quantity:  90 capsule   Refills:  1         These medicines have changed or have updated prescriptions.        Dose/Directions    atorvastatin 10 MG tablet   Commonly known as:  LIPITOR   This may have changed:    - how much to take  - how to take this  - when to take this  - additional instructions   Used for:  Coronary artery disease due to lipid rich plaque        Take 5mg (one-half tablet) twice a week   Quantity:  30 tablet   Refills:  3       estradiol 0.5 MG tablet   Commonly known as:  ESTRACE   This may have changed:    - how much to take  - how to take this  - when to take this  - additional instructions   Used for:  Atrophic vaginitis        1 tab vaginallly twice a week   Quantity:  24 tablet   Refills:  3            Where to get your medicines      These medications were sent to [facility] [address] [place] [address] [address]  [address] [facility] [facility] [address]     Phone:  [phone]     gabapentin 100 MG capsule                Primary Care Provider Office Phone # Fax #    Dinorah Kirstin Lorenzo [phone]       [address] [address]        Equal Access to Services     RIP MARTIN AH: niels Choe, romi rodriguez  ana paula espinozajordana batres'aan ah. So [facility] [place] [phone].    ATENCIÓN: Si kennyla merly, tiene a garcia disposición servicios gratuitos de asistencia lingüística. Mauro al [phone].    We comply with applicable federal civil rights laws and [place] laws. We do not discriminate on the basis of race, color, national origin, age, disability sex, sexual orientation or gender identity.            Thank you!     Thank you for choosing [facility]  for your care. Our goal is always to provide you with excellent care. Hearing back from our patients is one way we can continue to improve our services. Please take a few minutes to complete the written survey that you may receive in the mail after your visit with us. Thank you!             Your Updated Medication List - Protect others around you: Learn how to safely use, store and throw away your medicines at www.disposemymeds.org.          This list is accurate as of: 7/5/17  4:16 PM.  Always use your most recent med list.                   Brand Name Dispense Instructions for use Diagnosis    amitriptyline 10 MG tablet    ELAVIL    90 tablet    Take 1 tablet (10 mg) by mouth At Bedtime    Bladder pain       aspirin 81 MG tablet      1 TABLET DAILY        atorvastatin 10 MG tablet    LIPITOR    30 tablet    Take 5mg (one-half tablet) twice a week    Coronary artery disease due to lipid rich plaque       BD ULTRA FINE PEN NEEDLES     1 Box    Inject 1 each Subcutaneous 6 times daily    Type 1 diabetes mellitus with hyperglycemia (H)       * blood glucose monitoring test strip    no brand specified    500 strip    Use as directed, up to 6 times a day. One Touch test strips    Type I (juvenile type) diabetes mellitus with other specified manifestations, not stated as uncontrolled       * blood glucose monitoring test strip    ONE TOUCH VERIO IQ    500 strip    Use to test blood sugar 6 times daily or as directed.    Type 1 diabetes mellitus with  hyperglycemia (H)       cholecalciferol 1000 UNIT tablet    vitamin D    1 tablet    Take 1 tablet by mouth daily.        CINNAMON PO      Take 500 mg by mouth daily        CRANBERRY PO      Take 1 capsule by mouth daily        diazepam 2 MG tablet    VALIUM    30 tablet    Take 1 tablet (2 mg) by mouth nightly as needed for anxiety or sleep    Generalized anxiety disorder, Insomnia due to medical condition       estradiol 0.5 MG tablet    ESTRACE    24 tablet    1 tab vaginallly twice a week    Atrophic vaginitis       gabapentin 100 MG capsule    NEURONTIN    90 capsule    Take 1 capsule (100 mg) by mouth 3 times daily    Right facial pain       glucagon 1 MG kit    GLUCAGON EMERGENCY    1 mg    Inject 1 mg Subcutaneous once for 1 dose    Type 1 diabetes mellitus with hyperglycemia (H)       insulin aspart 100 UNITS/ML injection    NovoLOG    3 Month    1 unit per 12 grams of carb at meals, sliding scale: 150-190 +1, 191-230 +2, 231-270 +3, 271-310 +4, 311-350 +5, 351-390 +6, 391-430 +7, 431-470 +8    Type 1 diabetes, HbA1c goal < 8% (H)       insulin glargine 100 UNIT/ML injection    LANTUS SOLOSTAR    9 mL    Inject 10 Units Subcutaneous 2 times daily    Type 1 diabetes mellitus with hyperglycemia (H)       losartan 25 MG tablet    COZAAR    45 tablet    Take 0.5 tablets (12.5 mg) by mouth daily    Type 1 diabetes mellitus with microalbuminuria (H)       magnesium 250 MG tablet      Take 1 tablet by mouth daily.        * methylphenidate 20 MG tablet    RITALIN    30 tablet    Take 1/2-1 tablet by mouth in the morning.    Hypersomnia       * methylphenidate 20 MG tablet    RITALIN    30 tablet    Take 0.5-1 tablets (10-20 mg) by mouth daily    Hypersomnia       metoprolol 25 MG 24 hr tablet    TOPROL-XL    90 tablet    Take 1 tablet (25 mg) by mouth daily    Benign essential hypertension       nitroGLYcerin 0.4 MG sublingual tablet    NITROSTAT    25 tablet    Place 1 tablet (0.4 mg) under the tongue every 5  minutes as needed for chest pain    CAD (coronary artery disease)       OMEGA-3 FATTY ACIDS PO      Take 500 mg by mouth daily        omeprazole 20 MG CR capsule    priLOSEC    90 capsule    Take 1 capsule (20 mg) by mouth daily    Gastroesophageal reflux disease without esophagitis       Zinc 15 MG Caps      Take 1 capsule by mouth daily        * Notice:  This list has 4 medication(s) that are the same as other medications prescribed for you. Read the directions carefully, and ask your doctor or other care provider to review them with you.

## 2017-07-05 NOTE — NURSING NOTE
"Chief Complaint   Patient presents with     Jaw Pain       Initial /64 (BP Location: Right arm, Patient Position: Chair, Cuff Size: Adult Large)  Pulse 84  Temp 97.4  F (36.3  C) (Tympanic)  Resp 16  Wt 158 lb (71.7 kg)  SpO2 98%  BMI 27.12 kg/m2 Estimated body mass index is 27.12 kg/(m^2) as calculated from the following:    Height as of 6/23/17: 5' 4\" (1.626 m).    Weight as of this encounter: 158 lb (71.7 kg).  Medication Reconciliation: complete    Health Maintenance that is potentially due pending provider review:  NONE    n/a    "

## 2017-07-06 ENCOUNTER — ALLIED HEALTH/NURSE VISIT (OUTPATIENT)
Dept: EDUCATION SERVICES | Facility: CLINIC | Age: 52
End: 2017-07-06
Payer: MEDICARE

## 2017-07-06 ENCOUNTER — TELEPHONE (OUTPATIENT)
Dept: EDUCATION SERVICES | Facility: CLINIC | Age: 52
End: 2017-07-06

## 2017-07-06 DIAGNOSIS — R80.9 TYPE 1 DIABETES MELLITUS WITH MICROALBUMINURIA (H): Primary | Chronic | ICD-10-CM

## 2017-07-06 DIAGNOSIS — E10.65 TYPE 1 DIABETES MELLITUS WITH HYPERGLYCEMIA (H): Primary | Chronic | ICD-10-CM

## 2017-07-06 DIAGNOSIS — E10.29 TYPE 1 DIABETES MELLITUS WITH MICROALBUMINURIA (H): Primary | Chronic | ICD-10-CM

## 2017-07-06 PROCEDURE — G0108 DIAB MANAGE TRN  PER INDIV: HCPCS

## 2017-07-06 NOTE — MR AVS SNAPSHOT
After Visit Summary   7/6/2017    Glory Birmingham    MRN: [id number]           Patient Information     Date Of Birth          1965        Visit Information        Provider Department      7/6/2017 1:00 PM Karlos Cohen, LUISITO [facility]        Care Instructions    1.  We are going to work on getting tresiba.  I will call you when I hear from Dr. Lorenzo.  We will start at 17 units at bedtime, we will increase by 1 unit every 5 days till am numbers are 180 or below.  First goal so you are not scared of a low, we will work our way down.    2.  Let me know if hear from Mark within the next week.          Follow-ups after your visit        Your next 10 appointments already scheduled     Aug 11, 2017  9:00 AM CDT   LAB with [facility]   [facility]    [address] [id number] [phone]           Patient must bring picture ID.  Patient should be prepared to give a urine specimen  Please do not eat 10-12 hours before your appointment if you are coming in fasting for labs on lipids, cholesterol, or glucose (sugar).  Pregnant women should follow their Care Team instructions. Water with medications is okay. Do not drink coffee or other fluids.   If you have concerns about taking  your medications, please ask at office or if scheduling via [employer], send a message by clicking on Secure Mess[employer], Message Your Care Team.            Aug 15, 2017 10:40 AM CDT   SHORT with Dinorah Lorenzo, DO   [facility] ([facility]    [address] [id number] [phone]              Who to contact     If you have questions or need follow up information about today's clinic visit or your schedule please contact [facility] directly at [phone].  Normal or non-critical lab and imaging results will be communicated to you by MyChart, letter or phone within 4 business days after  the clinic has received the results. If you do not hear from us within 7 days, please contact the clinic through [employer] or phone. If you have a critical or abnormal lab result, we will notify you by phone as soon as possible.  Submit refill requests through [employer] or call your pharmacy and they will forward the refill request to us. Please allow 3 business days for your refill to be completed.          Additional Information About Your Visit        [employer] Information     [employer] gives you secure access to your electronic health record. If you see a primary care provider, you can also send messages to your care team and make appointments. If you have questions, please call your primary care clinic.  If you do not have a primary care provider, please call [phone] and they will assist you.        Care EveryWhere ID     This is your Care EveryWhere ID. This could be used by other organizations to access your [facility] medical records  [id number]         Blood Pressure from Last 3 Encounters:   07/05/17 108/64   06/23/17 118/70   05/16/17 110/72    Weight from Last 3 Encounters:   07/05/17 71.7 kg (158 lb)   06/23/17 68 kg (150 lb)   05/16/17 69.9 kg (154 lb)              Today, you had the following     No orders found for display         Today's Medication Changes          These changes are accurate as of: 7/6/17  2:10 PM.  If you have any questions, ask your nurse or doctor.               These medicines have changed or have updated prescriptions.        Dose/Directions    atorvastatin 10 MG tablet   Commonly known as:  LIPITOR   This may have changed:    - how much to take  - how to take this  - when to take this  - additional instructions   Used for:  Coronary artery disease due to lipid rich plaque        Take 5mg (one-half tablet) twice a week   Quantity:  30 tablet   Refills:  3       estradiol 0.5 MG tablet   Commonly known as:  ESTRACE   This may have changed:    - how much to take  - how to take  this  - when to take this  - additional instructions   Used for:  Atrophic vaginitis        1 tab vaginallly twice a week   Quantity:  24 tablet   Refills:  3                Primary Care Provider Office Phone # Fax #    Dinorah Lorenzo [phone]       [facility] [address] [address]        Equal Access to Services     RIP MARTIN : Hadii aad ku hadasho Soomaali, waaxda luqadaha, qaybta kaalmada adeegyada, waxay idiin hayaan adeeg kharash la'estela . So [facility] [phone].    ATENCIÓN: Si habla español, tiene a garcia disposición servicios gratuitos de asistencia lingüística. Mauro al [phone].    We comply with applicable federal civil rights laws and [place] laws. We do not discriminate on the basis of race, color, national origin, age, disability sex, sexual orientation or gender identity.            Thank you!     Thank you for choosing [facility]  for your care. Our goal is always to provide you with excellent care. Hearing back from our patients is one way we can continue to improve our services. Please take a few minutes to complete the written survey that you may receive in the mail after your visit with us. Thank you!             Your Updated Medication List - Protect others around you: Learn how to safely use, store and throw away your medicines at www.disposemymeds.org.          This list is accurate as of: 7/6/17  2:10 PM.  Always use your most recent med list.                   Brand Name Dispense Instructions for use Diagnosis    amitriptyline 10 MG tablet    ELAVIL    90 tablet    Take 1 tablet (10 mg) by mouth At Bedtime    Bladder pain       aspirin 81 MG tablet      1 TABLET DAILY        atorvastatin 10 MG tablet    LIPITOR    30 tablet    Take 5mg (one-half tablet) twice a week    Coronary artery disease due to lipid rich plaque       BD ULTRA FINE PEN NEEDLES     1 Box    Inject 1 each Subcutaneous 6 times daily    Type 1  diabetes mellitus with hyperglycemia (H)       * blood glucose monitoring test strip    no brand specified    500 strip    Use as directed, up to 6 times a day. One Touch test strips    Type I (juvenile type) diabetes mellitus with other specified manifestations, not stated as uncontrolled       * blood glucose monitoring test strip    ONE TOUCH VERIO IQ    500 strip    Use to test blood sugar 6 times daily or as directed.    Type 1 diabetes mellitus with hyperglycemia (H)       cholecalciferol 1000 UNIT tablet    vitamin D    1 tablet    Take 1 tablet by mouth daily.        CINNAMON PO      Take 500 mg by mouth daily        CRANBERRY PO      Take 1 capsule by mouth daily        diazepam 2 MG tablet    VALIUM    30 tablet    Take 1 tablet (2 mg) by mouth nightly as needed for anxiety or sleep    Generalized anxiety disorder, Insomnia due to medical condition       estradiol 0.5 MG tablet    ESTRACE    24 tablet    1 tab vaginallly twice a week    Atrophic vaginitis       gabapentin 100 MG capsule    NEURONTIN    90 capsule    Take 1 capsule (100 mg) by mouth 3 times daily    Right facial pain       glucagon 1 MG kit    GLUCAGON EMERGENCY    1 mg    Inject 1 mg Subcutaneous once for 1 dose    Type 1 diabetes mellitus with hyperglycemia (H)       insulin aspart 100 UNITS/ML injection    NovoLOG    3 Month    1 unit per 12 grams of carb at meals, sliding scale: 150-190 +1, 191-230 +2, 231-270 +3, 271-310 +4, 311-350 +5, 351-390 +6, 391-430 +7, 431-470 +8    Type 1 diabetes, HbA1c goal < 8% (H)       insulin glargine 100 UNIT/ML injection    LANTUS SOLOSTAR    9 mL    Inject 10 Units Subcutaneous 2 times daily    Type 1 diabetes mellitus with hyperglycemia (H)       losartan 25 MG tablet    COZAAR    45 tablet    Take 0.5 tablets (12.5 mg) by mouth daily    Type 1 diabetes mellitus with microalbuminuria (H)       magnesium 250 MG tablet      Take 1 tablet by mouth daily.        * methylphenidate 20 MG tablet    RITALIN     30 tablet    Take 1/2-1 tablet by mouth in the morning.    Hypersomnia       * methylphenidate 20 MG tablet    RITALIN    30 tablet    Take 0.5-1 tablets (10-20 mg) by mouth daily    Hypersomnia       metoprolol 25 MG 24 hr tablet    TOPROL-XL    90 tablet    Take 1 tablet (25 mg) by mouth daily    Benign essential hypertension       nitroGLYcerin 0.4 MG sublingual tablet    NITROSTAT    25 tablet    Place 1 tablet (0.4 mg) under the tongue every 5 minutes as needed for chest pain    CAD (coronary artery disease)       OMEGA-3 FATTY ACIDS PO      Take 500 mg by mouth daily        omeprazole 20 MG CR capsule    priLOSEC    90 capsule    Take 1 capsule (20 mg) by mouth daily    Gastroesophageal reflux disease without esophagitis       Zinc 15 MG Caps      Take 1 capsule by mouth daily        * Notice:  This list has 4 medication(s) that are the same as other medications prescribed for you. Read the directions carefully, and ask your doctor or other care provider to review them with you.

## 2017-07-06 NOTE — PATIENT INSTRUCTIONS
1.  We are going to work on getting tresiba.  I will call you when I hear from Dr. Lorenzo.  We will start at 17 units at bedtime, we will increase by 1 unit every 5 days till am numbers are 180 or below.  First goal so you are not scared of a low, we will work our way down.    2.  Let me know if hear from Mark within the next week.

## 2017-07-06 NOTE — TELEPHONE ENCOUNTER
Dr. Lorenzo,    Would like to try Tresiba with Glory and see if it works better than Lantus, less risk of hypoglycemia.  Plus she would only need to take it once daily.  We may need a prior authorization for this.  PLease let me know, thanks!  Ryanne Everett RD, CDE

## 2017-07-07 ENCOUNTER — OFFICE VISIT (OUTPATIENT)
Dept: FAMILY MEDICINE | Facility: CLINIC | Age: 52
End: 2017-07-07
Payer: MEDICARE

## 2017-07-07 VITALS
DIASTOLIC BLOOD PRESSURE: 61 MMHG | HEART RATE: 93 BPM | WEIGHT: 156 LBS | OXYGEN SATURATION: 99 % | TEMPERATURE: 97.2 F | RESPIRATION RATE: 18 BRPM | SYSTOLIC BLOOD PRESSURE: 103 MMHG | BODY MASS INDEX: 26.78 KG/M2

## 2017-07-07 DIAGNOSIS — R51.9 RIGHT FACIAL PAIN: ICD-10-CM

## 2017-07-07 DIAGNOSIS — A69.20 ERYTHEMA MIGRANS (LYME DISEASE): Primary | ICD-10-CM

## 2017-07-07 PROCEDURE — 99214 OFFICE O/P EST MOD 30 MIN: CPT | Performed by: NURSE PRACTITIONER

## 2017-07-07 RX ORDER — DOXYCYCLINE 100 MG/1
100 TABLET ORAL 2 TIMES DAILY
Qty: 42 TABLET | Refills: 0 | Status: SHIPPED | OUTPATIENT
Start: 2017-07-07 | End: 2017-07-28

## 2017-07-07 RX ORDER — HYDROCODONE BITARTRATE AND ACETAMINOPHEN 5; 325 MG/1; MG/1
1 TABLET ORAL EVERY 8 HOURS PRN
Qty: 18 TABLET | Refills: 0 | Status: SHIPPED | OUTPATIENT
Start: 2017-07-07 | End: 2017-07-21

## 2017-07-07 NOTE — PROGRESS NOTES
"  SUBJECTIVE:                                                    Glory Birmingham is a 51 year old female who presents to clinic today for the following health issues:      Bug bite      Duration: 3 days    Description (location/character/radiation): red ring on the back of the right knee    Intensity:  No pain    Accompanying signs and symptoms: none    History (similar episodes/previous evaluation): None    Precipitating or alleviating factors: None    Therapies tried and outcome: None     noticed bite 3 days ago  notice a ring today on the back of her leg.     Has been feeling well other than right side facial pain   She was seen earlier this week for this was was treated with gabapentin   She reports that this has not been helpful   Has been using ibuprofen (has had gastritis with an acute bleed and was told to avoid ibuprofen products)  Does not feel like TMJ    Seemed to start the same time as this \"insect bug\"  The bite was noticed       Problem list and histories reviewed & adjusted, as indicated.  Additional history: as documented    Patient Active Problem List   Diagnosis     Type 1 diabetes mellitus with hyperglycemia (HCC)     Gastroesophageal reflux disease without esophagitis     Benign essential hypertension     Amenorrhea     Thyroid nodules     Scleredema of [place] (H)     Coronary artery disease involving native coronary artery of native heart with angina pectoris with documented spasm (H)     CARDIOVASCULAR SCREENING; LDL GOAL LESS THAN 100     Idiopathic hypersomnia with long sleep time     Type 1 diabetes mellitus with diabetic neuropathy (H)     Mild major depression (H)     Fibromyalgia     Generalized anxiety disorder     History of bilateral saline breast implants     Subclinical hypothyroidism     Hyperlipidemia LDL goal <70     Senile cataract     Organ transplant candidate     Recurrent UTI     Proliferative diabetic retinopathy associated with type 1 diabetes mellitus, macular " edema presence unspecified     Diabetic gastroparesis (H)     Coccydynia     Atrophic vaginitis     Hypoglycemia unawareness in type 1 diabetes mellitus (H)     Type 1 diabetes mellitus with proliferative retinopathy (H)     Cervicalgia     Bilateral occipital neuralgia     Lumbosacral spondylosis without myelopathy     Type 1 diabetes mellitus with microalbuminuria (H)     Past Surgical History:   Procedure Laterality Date     BENCH PANCREAS  12/10/2012    Procedure: BENCH PANCREAS;;  Surgeon: Soto Mills MD;  Location: [facility]     COLONOSCOPY N/A 6/23/2017    Procedure: COLONOSCOPY;  Colonoscopy  ;  Surgeon: Austin Wallace MD;  Location: [facility]     CYSTECTOMY PILONIDAL N/A 7/13/2016    Procedure: CYSTECTOMY PILONIDAL;  Surgeon: Austin Wallace MD;  Location: [facility]     LAPAROSCOPY PROCEDURE UNLISTED  2007    diagnostic     MASTECTOMY, SIMPLE RT/LT/RASHAAD  2000    bilateral mastectomy, saline implants--fibrocystic disease--abnormal mammo-biopsies, etc--     MUSCLE BIOPSY  2008    back of neck,  path found tissue related to diabetes     PHACOEMULSIFICATION WITH STANDARD INTRAOCULAR LENS IMPLANT  7/28/2014    Procedure: PHACOEMULSIFICATION WITH STANDARD INTRAOCULAR LENS IMPLANT;  Surgeon: Roge Avendano MD;  Location: [facility]     PHACOEMULSIFICATION WITH STANDARD INTRAOCULAR LENS IMPLANT Right 8/25/2016    Procedure: PHACOEMULSIFICATION WITH STANDARD INTRAOCULAR LENS IMPLANT;  Surgeon: Roge Avendano MD;  Location: [facility]     SURGICAL HISTORY OF -       vitrectomy, bilateral     SURGICAL HISTORY OF -       c/section x 3, BTL       Social History   Substance Use Topics     Smoking status: Never Smoker     Smokeless tobacco: Never Used     Alcohol use No     Family History   Problem Relation Age of Onset     GASTROINTESTINAL DISEASE Mother      Thyroid Disease Mother      Arthritis Mother      Cardiomyopathy Mother      CANCER Father      lung, spread to bone and brain     HEART DISEASE Father       Arthritis Father      DIABETES Sister           HEART DISEASE Sister      Thyroid Disease Sister      C.A.D. Sister      Obesity Daughter      Allergies Son      Depression Sister      Thyroid Disease Sister      Thyroid Disease Sister      Alcohol/Drug Brother            Reviewed and updated as needed this visit by clinical staff  Tobacco  Allergies  Med Hx  Surg Hx  Fam Hx  Soc Hx      Reviewed and updated as needed this visit by Provider         ROS:  Constitutional, HEENT, cardiovascular, pulmonary, gi and gu systems are negative, except as otherwise noted.    OBJECTIVE:                                                    /61 (BP Location: Right arm, Patient Position: Chair, Cuff Size: Adult Regular)  Pulse 93  Temp 97.2  F (36.2  C) (Tympanic)  Resp 18  Wt 156 lb (70.8 kg)  SpO2 99%  BMI 26.78 kg/m2  Body mass index is 26.78 kg/(m^2).  GENERAL APPEARANCE: healthy, alert and no distress  HENT: ear canals and TM's normal and nose and mouth without ulcers or lesions  RESP: lungs clear to auscultation - no rales, rhonchi or wheezes  CV: regular rates and rhythm, normal S1 S2, no S3 or S4 and no murmur, click or rub  ABDOMEN: soft, nontender, without hepatosplenomegaly or masses and bowel sounds normal  MS: extremities normal- no gross deformities noted  SKIN: behind her right knee there is a 1 cm area of with erythema and central clearing   NEURO: Normal strength and tone, mentation intact and speech normal  PSYCH: mentation appears normal and affect normal/bright    Diagnostic test results:  Diagnostic Test Results:  none      ASSESSMENT/PLAN:                                                          Patient Instructions   1. Erythema migrans (Lyme disease)  Will treat for lyme disease   Does cause sun sensitivity   See me back in 2 weeks for a recheck   - doxycycline Monohydrate 100 MG TABS; Take 100 mg by mouth 2 times daily for 21 days  Dispense: 42 tablet; Refill: 0    2. Right facial  pain  Few pain pills provided  Use sparingly   Do not drive while taking   Follow up in 2 weeks   - HYDROcodone-acetaminophen (NORCO) 5-325 MG per tablet; Take 1 tablet by mouth every 8 hours as needed for pain maximum 4 tablet(s) per day  Dispense: 18 tablet; Refill: 0    Will check lakbs for lyme disease at 2 week follow up   Judith Hanks NP  [facility]

## 2017-07-07 NOTE — MR AVS SNAPSHOT
After Visit Summary   7/7/2017    Glory Birmingham    MRN: [id number]           Patient Information     Date Of Birth          1965        Visit Information        Provider Department      7/7/2017 3:20 PM Judith Hanks NP [facility]        Today's Diagnoses     Erythema migrans (Lyme disease)    -  1    Right facial pain          Care Instructions    1. Erythema migrans (Lyme disease)  Will treat for lyme disease   Does cause sun sensitivity   See me back in 2 weeks for a recheck   - doxycycline Monohydrate 100 MG TABS; Take 100 mg by mouth 2 times daily for 21 days  Dispense: 42 tablet; Refill: 0    2. Right facial pain  Few pain pills provided  Use sparingly   Do not drive while taking   Follow up in 2 weeks   - HYDROcodone-acetaminophen (NORCO) 5-325 MG per tablet; Take 1 tablet by mouth every 8 hours as needed for pain maximum 4 tablet(s) per day  Dispense: 18 tablet; Refill: 0          Follow-ups after your visit        Your next 10 appointments already scheduled     Aug 11, 2017  9:00 AM CDT   LAB with PI LAB   [facility] ([facility])    [address] [facility] [id number] [phone]           Patient must bring picture ID.  Patient should be prepared to give a urine specimen  Please do not eat 10-12 hours before your appointment if you are coming in fasting for labs on lipids, cholesterol, or glucose (sugar).  Pregnant women should follow their Care Team instructions. Water with medications is okay. Do not drink coffee or other fluids.   If you have concerns about taking  your medications, please ask at office or if scheduling via [employer], send a message by clicking on Secure Messaging, Message Your Care Team.            Aug 15, 2017 10:40 AM CDT   SHORT with Dinorah Lorenzo,    [facility] ([facility])    [address] [facility] [address] [phone]              Who to contact      If you have questions or need follow up information about today's clinic visit or your schedule please contact [facility] directly at [phone].  Normal or non-critical lab and imaging results will be communicated to you by TapFithart, letter or phone within 4 business days after the clinic has received the results. If you do not hear from us within 7 days, please contact the clinic through TapFithart or phone. If you have a critical or abnormal lab result, we will notify you by phone as soon as possible.  Submit refill requests through Viepage or call your pharmacy and they will forward the refill request to us. Please allow 3 business days for your refill to be completed.          Additional Information About Your Visit        TapFitharCPM Braxis Information     Viepage gives you secure access to your electronic health record. If you see a primary care provider, you can also send messages to your care team and make appointments. If you have questions, please call your primary care clinic.  If you do not have a primary care provider, please call [phone] and they will assist you.        Care EveryWhere ID     This is your Care EveryWhere ID. This could be used by other organizations to access your [facility] medical records  [id number]        Your Vitals Were     Pulse Temperature Respirations Pulse Oximetry BMI (Body Mass Index)       93 97.2  F (36.2  C) (Tympanic) 18 99% 26.78 kg/m2        Blood Pressure from Last 3 Encounters:   07/07/17 103/61   07/05/17 108/64   06/23/17 118/70    Weight from Last 3 Encounters:   07/07/17 156 lb (70.8 kg)   07/05/17 158 lb (71.7 kg)   06/23/17 150 lb (68 kg)              Today, you had the following     No orders found for display         Today's Medication Changes          These changes are accurate as of: 7/7/17  4:06 PM.  If you have any questions, ask your nurse or doctor.               Start taking these medicines.        Dose/Directions    doxycycline  Monohydrate 100 MG Tabs   Used for:  Erythema migrans (Lyme disease)   Started by:  Judith Hanks NP        Dose:  100 mg   Take 100 mg by mouth 2 times daily for 21 days   Quantity:  42 tablet   Refills:  0       HYDROcodone-acetaminophen 5-325 MG per tablet   Commonly known as:  NORCO   Used for:  Right facial pain   Started by:  Judith Hanks NP        Dose:  1 tablet   Take 1 tablet by mouth every 8 hours as needed for pain maximum 4 tablet(s) per day   Quantity:  18 tablet   Refills:  0         These medicines have changed or have updated prescriptions.        Dose/Directions    atorvastatin 10 MG tablet   Commonly known as:  LIPITOR   This may have changed:    - how much to take  - how to take this  - when to take this  - additional instructions   Used for:  Coronary artery disease due to lipid rich plaque        Take 5mg (one-half tablet) twice a week   Quantity:  30 tablet   Refills:  3       estradiol 0.5 MG tablet   Commonly known as:  ESTRACE   This may have changed:    - how much to take  - how to take this  - when to take this  - additional instructions   Used for:  Atrophic vaginitis        1 tab vaginallly twice a week   Quantity:  24 tablet   Refills:  3            Where to get your medicines      These medications were sent to [facility] [address] [place] [address] [address] [place] [address]  [address] [facility] [address]     Phone:  [phone]     doxycycline Monohydrate 100 MG Tabs         Some of these will need a paper prescription and others can be bought over the counter.  Ask your nurse if you have questions.     Bring a paper prescription for each of these medications     HYDROcodone-acetaminophen 5-325 MG per tablet                Primary Care Provider Office Phone # Fax #    Dinorahjessica Lorenzo [phone]       [facility] [address] [facility] [address]        Equal Access to Services     RIP MARTIN AH: niels Choe  christos ana paula lundberg. So [facility] [phone].    ATENCIÓN: Si yudith moreland, tiene a garcia disposición servicios gratuitos de asistencia lingüística. Mauro al [phone].    We comply with applicable federal civil rights laws and [place] laws. We do not discriminate on the basis of race, color, national origin, age, disability sex, sexual orientation or gender identity.            Thank you!     Thank you for choosing [facility]  for your care. Our goal is always to provide you with excellent care. Hearing back from our patients is one way we can continue to improve our services. Please take a few minutes to complete the written survey that you may receive in the mail after your visit with us. Thank you!             Your Updated Medication List - Protect others around you: Learn how to safely use, store and throw away your medicines at www.disposemymeds.org.          This list is accurate as of: 7/7/17  4:06 PM.  Always use your most recent med list.                   Brand Name Dispense Instructions for use Diagnosis    amitriptyline 10 MG tablet    ELAVIL    90 tablet    Take 1 tablet (10 mg) by mouth At Bedtime    Bladder pain       aspirin 81 MG tablet      1 TABLET DAILY        atorvastatin 10 MG tablet    LIPITOR    30 tablet    Take 5mg (one-half tablet) twice a week    Coronary artery disease due to lipid rich plaque       BD ULTRA FINE PEN NEEDLES     1 Box    Inject 1 each Subcutaneous 6 times daily    Type 1 diabetes mellitus with hyperglycemia (H)       * blood glucose monitoring test strip    no brand specified    500 strip    Use as directed, up to 6 times a day. One Touch test strips    Type I (juvenile type) diabetes mellitus with other specified manifestations, not stated as uncontrolled       * blood glucose monitoring test strip    ONE TOUCH VERIO IQ    500 strip    Use to test blood sugar 6 times daily or as directed.     Type 1 diabetes mellitus with hyperglycemia (H)       cholecalciferol 1000 UNIT tablet    vitamin D    1 tablet    Take 1 tablet by mouth daily.        CINNAMON PO      Take 500 mg by mouth daily        CRANBERRY PO      Take 1 capsule by mouth daily        diazepam 2 MG tablet    VALIUM    30 tablet    Take 1 tablet (2 mg) by mouth nightly as needed for anxiety or sleep    Generalized anxiety disorder, Insomnia due to medical condition       doxycycline Monohydrate 100 MG Tabs     42 tablet    Take 100 mg by mouth 2 times daily for 21 days    Erythema migrans (Lyme disease)       estradiol 0.5 MG tablet    ESTRACE    24 tablet    1 tab vaginallly twice a week    Atrophic vaginitis       gabapentin 100 MG capsule    NEURONTIN    90 capsule    Take 1 capsule (100 mg) by mouth 3 times daily    Right facial pain       glucagon 1 MG kit    GLUCAGON EMERGENCY    1 mg    Inject 1 mg Subcutaneous once for 1 dose    Type 1 diabetes mellitus with hyperglycemia (H)       HYDROcodone-acetaminophen 5-325 MG per tablet    NORCO    18 tablet    Take 1 tablet by mouth every 8 hours as needed for pain maximum 4 tablet(s) per day    Right facial pain       insulin aspart 100 UNITS/ML injection    NovoLOG    3 Month    1 unit per 12 grams of carb at meals, sliding scale: 150-190 +1, 191-230 +2, 231-270 +3, 271-310 +4, 311-350 +5, 351-390 +6, 391-430 +7, 431-470 +8    Type 1 diabetes, HbA1c goal < 8% (H)       insulin degludec 100 UNIT/ML pen    TRESIBA    15 mL    Take 17 units at bedtime (increase by 1 unit every 5 days till am readings are under 180)    Type 1 diabetes mellitus with microalbuminuria (H)       insulin glargine 100 UNIT/ML injection    LANTUS SOLOSTAR    9 mL    Inject 10 Units Subcutaneous 2 times daily    Type 1 diabetes mellitus with hyperglycemia (H)       losartan 25 MG tablet    COZAAR    45 tablet    Take 0.5 tablets (12.5 mg) by mouth daily    Type 1 diabetes mellitus with microalbuminuria (H)       magnesium  250 MG tablet      Take 1 tablet by mouth daily.        * methylphenidate 20 MG tablet    RITALIN    30 tablet    Take 1/2-1 tablet by mouth in the morning.    Hypersomnia       * methylphenidate 20 MG tablet    RITALIN    30 tablet    Take 0.5-1 tablets (10-20 mg) by mouth daily    Hypersomnia       metoprolol 25 MG 24 hr tablet    TOPROL-XL    90 tablet    Take 1 tablet (25 mg) by mouth daily    Benign essential hypertension       nitroGLYcerin 0.4 MG sublingual tablet    NITROSTAT    25 tablet    Place 1 tablet (0.4 mg) under the tongue every 5 minutes as needed for chest pain    CAD (coronary artery disease)       OMEGA-3 FATTY ACIDS PO      Take 500 mg by mouth daily        omeprazole 20 MG CR capsule    priLOSEC    90 capsule    Take 1 capsule (20 mg) by mouth daily    Gastroesophageal reflux disease without esophagitis       Zinc 15 MG Caps      Take 1 capsule by mouth daily        * Notice:  This list has 4 medication(s) that are the same as other medications prescribed for you. Read the directions carefully, and ask your doctor or other care provider to review them with you.

## 2017-07-07 NOTE — TELEPHONE ENCOUNTER
If prior authorization needed:    Better coverage only needed once daily, pt sometimes has issues forgetting to take insulin.  Studies show less risk of hypoglycemia, pt has large fear of low blood sugars    Ryanne Everett RD, CDE

## 2017-07-07 NOTE — NURSING NOTE
"Chief Complaint   Patient presents with     Insect Bites     tick unknown       Initial /61 (BP Location: Right arm, Patient Position: Chair, Cuff Size: Adult Regular)  Pulse 93  Temp 97.2  F (36.2  C) (Tympanic)  Resp 18  Wt 156 lb (70.8 kg)  SpO2 99%  BMI 26.78 kg/m2 Estimated body mass index is 26.78 kg/(m^2) as calculated from the following:    Height as of 6/23/17: 5' 4\" (1.626 m).    Weight as of this encounter: 156 lb (70.8 kg).  Medication Reconciliation: complete    Health Maintenance that is potentially due pending provider review:  NONE    n/a    "

## 2017-07-07 NOTE — PATIENT INSTRUCTIONS
1. Erythema migrans (Lyme disease)  Will treat for lyme disease   Does cause sun sensitivity   See me back in 2 weeks for a recheck   - doxycycline Monohydrate 100 MG TABS; Take 100 mg by mouth 2 times daily for 21 days  Dispense: 42 tablet; Refill: 0    2. Right facial pain  Few pain pills provided  Use sparingly   Do not drive while taking   Follow up in 2 weeks   - HYDROcodone-acetaminophen (NORCO) 5-325 MG per tablet; Take 1 tablet by mouth every 8 hours as needed for pain maximum 4 tablet(s) per day  Dispense: 18 tablet; Refill: 0

## 2017-07-11 ENCOUNTER — VIRTUAL VISIT (OUTPATIENT)
Dept: EDUCATION SERVICES | Facility: CLINIC | Age: 52
End: 2017-07-11

## 2017-07-11 DIAGNOSIS — E10.65 TYPE 1 DIABETES MELLITUS WITH HYPERGLYCEMIA (H): Primary | Chronic | ICD-10-CM

## 2017-07-11 NOTE — MR AVS SNAPSHOT
After Visit Summary   7/11/2017    Glory Birmingham    MRN: [id number]           Patient Information     Date Of Birth          1965        Visit Information        Provider Department      7/11/2017 11:15 AM [address], RD [facility]        Today's Diagnoses     Type 1 diabetes mellitus with hyperglycemia (HCC)    -  1       Follow-ups after your visit        Your next 10 appointments already scheduled     Jul 11, 2017 11:15 AM CDT   Telephone Visit with Ryanne Everett Rd, RD   [facility] ([facility])    [address] [place] [id number] [phone]           Note: this is not an onsite visit; there is no need to come to the facility.            Aug 11, 2017  9:00 AM CDT   LAB with PI LAB   [facility] ([facility])    [address] [facility] [id number] [phone]           Patient must bring picture ID.  Patient should be prepared to give a urine specimen  Please do not eat 10-12 hours before your appointment if you are coming in fasting for labs on lipids, cholesterol, or glucose (sugar).  Pregnant women should follow their Care Team instructions. Water with medications is okay. Do not drink coffee or other fluids.   If you have concerns about taking  your medications, please ask at office or if scheduling via Fastnote, send a message by clicking on Secure Messaging, Message Your Care Team.            Aug 15, 2017 10:40 AM CDT   SHORT with Dinorah Lorenzo,    [facility] ([facility]    [address] [facility] [id number] [phone]              Who to contact     If you have questions or need follow up information about today's clinic visit or your schedule please contact [facility] directly at [phone].  Normal or non-critical lab and imaging results will be communicated to you by Stylendahart, letter or phone  within 4 business days after the clinic has received the results. If you do not hear from us within 7 days, please contact the clinic through CareLinx or phone. If you have a critical or abnormal lab result, we will notify you by phone as soon as possible.  Submit refill requests through [employer] or call your pharmacy and they will forward the refill request to us. Please allow 3 business days for your refill to be completed.          Additional Information About Your Visit        CareLinx Information     [employer] gives you secure access to your electronic health record. If you see a primary care provider, you can also send messages to your care team and make appointments. If you have questions, please call your primary care clinic.  If you do not have a primary care provider, please call [phone] and they will assist you.        Care EveryWhere ID     This is your Care EveryWhere ID. This could be used by other organizations to access your [facility] medical records  [id number]         Blood Pressure from Last 3 Encounters:   07/07/17 103/61   07/05/17 108/64   06/23/17 118/70    Weight from Last 3 Encounters:   07/07/17 70.8 kg (156 lb)   07/05/17 71.7 kg (158 lb)   06/23/17 68 kg (150 lb)              Today, you had the following     No orders found for display         Today's Medication Changes          These changes are accurate as of: 7/11/17 10:59 AM.  If you have any questions, ask your nurse or doctor.               These medicines have changed or have updated prescriptions.        Dose/Directions    atorvastatin 10 MG tablet   Commonly known as:  LIPITOR   This may have changed:    - how much to take  - how to take this  - when to take this  - additional instructions   Used for:  Coronary artery disease due to lipid rich plaque        Take 5mg (one-half tablet) twice a week   Quantity:  30 tablet   Refills:  3       estradiol 0.5 MG tablet   Commonly known as:  ESTRACE   This may have changed:    - how  much to take  - how to take this  - when to take this  - additional instructions   Used for:  Atrophic vaginitis        1 tab vaginallly twice a week   Quantity:  24 tablet   Refills:  3                Primary Care Provider Office Phone # Fax #    Dinorah Lorenzo [phone]       [facility] [address] [facility] [address]        Equal Access to Services     RIP MARTIN : Hadii aad ku hadasho Soomaali, waaxda luqadaha, qaybta kaalmada adeegyada, waxay idiin hayaan adeeg kharash latri oshea. So [facility] [phone].    ATENCIÓN: Si habla espsherie, tiene a garcia disposición servicios gratuitos de asistencia lingüística. Diamondskye al [phone].    We comply with applicable federal civil rights laws and [place] laws. We do not discriminate on the basis of race, color, national origin, age, disability sex, sexual orientation or gender identity.            Thank you!     Thank you for choosing [facility]  for your care. Our goal is always to provide you with excellent care. Hearing back from our patients is one way we can continue to improve our services. Please take a few minutes to complete the written survey that you may receive in the mail after your visit with us. Thank you!             Your Updated Medication List - Protect others around you: Learn how to safely use, store and throw away your medicines at www.disposemymeds.org.          This list is accurate as of: 7/11/17 10:59 AM.  Always use your most recent med list.                   Brand Name Dispense Instructions for use Diagnosis    amitriptyline 10 MG tablet    ELAVIL    90 tablet    Take 1 tablet (10 mg) by mouth At Bedtime    Bladder pain       aspirin 81 MG tablet      1 TABLET DAILY        atorvastatin 10 MG tablet    LIPITOR    30 tablet    Take 5mg (one-half tablet) twice a week    Coronary artery disease due to lipid rich plaque       BD ULTRA FINE PEN NEEDLES     1 Box    Inject 1 each  Subcutaneous 6 times daily    Type 1 diabetes mellitus with hyperglycemia (H)       * blood glucose monitoring test strip    no brand specified    500 strip    Use as directed, up to 6 times a day. One Touch test strips    Type I (juvenile type) diabetes mellitus with other specified manifestations, not stated as uncontrolled       * blood glucose monitoring test strip    ONE TOUCH VERIO IQ    500 strip    Use to test blood sugar 6 times daily or as directed.    Type 1 diabetes mellitus with hyperglycemia (H)       cholecalciferol 1000 UNIT tablet    vitamin D    1 tablet    Take 1 tablet by mouth daily.        CINNAMON PO      Take 500 mg by mouth daily        CRANBERRY PO      Take 1 capsule by mouth daily        diazepam 2 MG tablet    VALIUM    30 tablet    Take 1 tablet (2 mg) by mouth nightly as needed for anxiety or sleep    Generalized anxiety disorder, Insomnia due to medical condition       doxycycline Monohydrate 100 MG Tabs     42 tablet    Take 100 mg by mouth 2 times daily for 21 days    Erythema migrans (Lyme disease)       estradiol 0.5 MG tablet    ESTRACE    24 tablet    1 tab vaginallly twice a week    Atrophic vaginitis       gabapentin 100 MG capsule    NEURONTIN    90 capsule    Take 1 capsule (100 mg) by mouth 3 times daily    Right facial pain       glucagon 1 MG kit    GLUCAGON EMERGENCY    1 mg    Inject 1 mg Subcutaneous once for 1 dose    Type 1 diabetes mellitus with hyperglycemia (H)       HYDROcodone-acetaminophen 5-325 MG per tablet    NORCO    18 tablet    Take 1 tablet by mouth every 8 hours as needed for pain maximum 4 tablet(s) per day    Right facial pain       insulin aspart 100 UNITS/ML injection    NovoLOG    3 Month    1 unit per 12 grams of carb at meals, sliding scale: 150-190 +1, 191-230 +2, 231-270 +3, 271-310 +4, 311-350 +5, 351-390 +6, 391-430 +7, 431-470 +8    Type 1 diabetes, HbA1c goal < 8% (H)       insulin degludec 100 UNIT/ML pen    TRESIBA    15 mL    Take 17  units at bedtime (increase by 1 unit every 5 days till am readings are under 180)    Type 1 diabetes mellitus with microalbuminuria (H)       insulin glargine 100 UNIT/ML injection    LANTUS SOLOSTAR    9 mL    Inject 10 Units Subcutaneous 2 times daily    Type 1 diabetes mellitus with hyperglycemia (H)       losartan 25 MG tablet    COZAAR    45 tablet    Take 0.5 tablets (12.5 mg) by mouth daily    Type 1 diabetes mellitus with microalbuminuria (H)       magnesium 250 MG tablet      Take 1 tablet by mouth daily.        * methylphenidate 20 MG tablet    RITALIN    30 tablet    Take 1/2-1 tablet by mouth in the morning.    Hypersomnia       * methylphenidate 20 MG tablet    RITALIN    30 tablet    Take 0.5-1 tablets (10-20 mg) by mouth daily    Hypersomnia       metoprolol 25 MG 24 hr tablet    TOPROL-XL    90 tablet    Take 1 tablet (25 mg) by mouth daily    Benign essential hypertension       nitroGLYcerin 0.4 MG sublingual tablet    NITROSTAT    25 tablet    Place 1 tablet (0.4 mg) under the tongue every 5 minutes as needed for chest pain    CAD (coronary artery disease)       OMEGA-3 FATTY ACIDS PO      Take 500 mg by mouth daily        omeprazole 20 MG CR capsule    priLOSEC    90 capsule    Take 1 capsule (20 mg) by mouth daily    Gastroesophageal reflux disease without esophagitis       Zinc 15 MG Caps      Take 1 capsule by mouth daily        * Notice:  This list has 4 medication(s) that are the same as other medications prescribed for you. Read the directions carefully, and ask your doctor or other care provider to review them with you.

## 2017-07-11 NOTE — PROGRESS NOTES
Raymond Pearl, I began taking the triseba on Sunday night, 17 units. Here's my readings from yesterday and this morning.     Mon 7/10 11:45a 117, 5:00p 190, 8:00p 170 Took Triseba at 11:00p    Tues 7/11 12:10a 103 (had 20g carb and no insulin) and checked it at 1:30a and it was 179. I woke up this morning at 10:30a with 79.     I'm a bit concerned with the drop of 100 overnight. What would you suggest for tonight? Decrease by 1 unit..or more?  I'm definitely having better stability on this insulin!     Glory,  Lets drop it by two units and see how it goes, remember it takes a few days to really get results from the drop in insulin.  You may want to set up some 4am wake up to check blood sugars. What a change from what you were getting for blood sugars in the morning :).     Ryanne Everett RD, CDE      Any diabetes medication dose changes were made via the CDE Protocol and Collaborative Practice Agreement with the patient's primary care provider. A copy of this encounter was shared with the provider.

## 2017-07-17 DIAGNOSIS — I10 BENIGN ESSENTIAL HYPERTENSION: ICD-10-CM

## 2017-07-17 DIAGNOSIS — R39.89 BLADDER PAIN: ICD-10-CM

## 2017-07-17 NOTE — TELEPHONE ENCOUNTER
Metoprolol      Last Written Prescription Date: 08/30/2016  Last Fill Quantity: 90, # refills: 2  [place] [facility] [place]  Health prescribing provider: 07/07/2017  Next 5 appointments (look out 90 days)     Jul 21, 2017 12:40 PM CDT   SHORT with Judith Hanks NP   [facility]    [address] [address]   [phone]            Aug 15, 2017 10:40 AM CDT   SHORT with Dinorah Lorenzo,    [facility] ([facility]    [address] [address] [phone]                   Potassium   Date Value Ref Range Status   05/16/2017 3.9 3.4 - 5.3 mmol/L Final     Creatinine   Date Value Ref Range Status   05/16/2017 0.86 0.52 - 1.04 mg/dL Final     BP Readings from Last 3 Encounters:   07/07/17 103/61   07/05/17 108/64   06/23/17 118/70       Amitriptyline      Last Written Prescription Date: 07/19/2016  Last Fill Quantity: 90,  # refills: 3   Last [place] [facility] [place]  Health prescribing provider: 07/07/2017                                         Next 5 appointments (look out 90 days)     Jul 21, 2017 12:40 PM CDT   SHORT with Judith Hanks NP   [facility]    [address] [address]   [phone]            Aug 15, 2017 10:40 AM CDT   SHORT with Dinorah Lorenzo DO   [facility] ([facility])    [address] [address] [phone]                  Bear HERNDON (R)

## 2017-07-21 ENCOUNTER — OFFICE VISIT (OUTPATIENT)
Dept: FAMILY MEDICINE | Facility: CLINIC | Age: 52
End: 2017-07-21
Payer: MEDICARE

## 2017-07-21 VITALS
RESPIRATION RATE: 16 BRPM | TEMPERATURE: 97.1 F | HEART RATE: 70 BPM | BODY MASS INDEX: 26.43 KG/M2 | SYSTOLIC BLOOD PRESSURE: 108 MMHG | OXYGEN SATURATION: 98 % | DIASTOLIC BLOOD PRESSURE: 66 MMHG | WEIGHT: 154 LBS

## 2017-07-21 DIAGNOSIS — E10.29 TYPE 1 DIABETES MELLITUS WITH MICROALBUMINURIA (H): ICD-10-CM

## 2017-07-21 DIAGNOSIS — R80.9 TYPE 1 DIABETES MELLITUS WITH MICROALBUMINURIA (H): ICD-10-CM

## 2017-07-21 DIAGNOSIS — A69.20 ERYTHEMA MIGRANS (LYME DISEASE): Primary | ICD-10-CM

## 2017-07-21 PROCEDURE — 36415 COLL VENOUS BLD VENIPUNCTURE: CPT | Performed by: NURSE PRACTITIONER

## 2017-07-21 PROCEDURE — 86618 LYME DISEASE ANTIBODY: CPT | Performed by: NURSE PRACTITIONER

## 2017-07-21 PROCEDURE — 99212 OFFICE O/P EST SF 10 MIN: CPT | Performed by: NURSE PRACTITIONER

## 2017-07-21 NOTE — PROGRESS NOTES
SUBJECTIVE:                                                    Glory Birmingham is a 51 year old female who presents to clinic today for the following health issues:      Follow up from tick bite  Currently on doxycycline has I week left   TMG symptoms resolved and erythema migrains rash resolved within 2-3 days of the antibiotic   She is feeling much better   Would like to be rechecked for lymes         Problem list and histories reviewed & adjusted, as indicated.  Additional history: as documented    Labs reviewed in EPIC    Reviewed and updated as needed this visit by clinical staff       Reviewed and updated as needed this visit by Provider         ROS:  Constitutional, HEENT, cardiovascular, pulmonary, gi and gu systems are negative, except as otherwise noted.      OBJECTIVE:                                                    /66 (BP Location: Right arm, Patient Position: Chair, Cuff Size: Adult Regular)  Pulse 70  Temp 97.1  F (36.2  C) (Tympanic)  Resp 16  Wt 154 lb (69.9 kg)  SpO2 98%  BMI 26.43 kg/m2  Body mass index is 26.43 kg/(m^2).  GENERAL APPEARANCE: healthy, alert and no distress  RESP: lungs clear to auscultation - no rales, rhonchi or wheezes  CV: regular rates and rhythm, normal S1 S2, no S3 or S4 and no murmur, click or rub  MS: extremities normal- no gross deformities noted  SKIN: no suspicious lesions or rashes  PSYCH: mentation appears normal and affect normal/bright    Diagnostic test results:  Diagnostic Test Results:  Pending        ASSESSMENT/PLAN:                                                    1. Erythema migrans (Lyme disease)  Symptoms resolved  - Lyme Disease Tammi with reflex to WB Serum checked today     2. Type 1 diabetes mellitus with microalbuminuria (H)  Has follow up with PCP      Patient Instructions   Will test for lyme disease     Finish the course of antibiotic     Follow up with PCP as planned       Judith Hanks NP  [facility]

## 2017-07-21 NOTE — MR AVS SNAPSHOT
After Visit Summary   7/21/2017    Glory Darby    MRN: [id number]           Patient Information     Date Of Birth          1965        Visit Information        Provider Department      7/21/2017 12:40 PM Judith Hanks NP [facility]        Today's Diagnoses     Erythema migrans (Lyme disease)    -  1      Care Instructions    Will test for lyme disease     Finish the course of antibiotic     Follow up with PCP as planned           Follow-ups after your visit        Your next 10 appointments already scheduled     Aug 11, 2017  9:00 AM CDT   LAB with [facility] LAB   [facility] ([facility])    [address] [address]   [phone]           Patient must bring picture ID.  Patient should be prepared to give a urine specimen  Please do not eat 10-12 hours before your appointment if you are coming in fasting for labs on lipids, cholesterol, or glucose (sugar).  Pregnant women should follow their Care Team instructions. Water with medications is okay. Do not drink coffee or other fluids.   If you have concerns about taking  your medications, please ask at office or if scheduling via [employer], send a message by clicking on Secure Messaging, Message Your Care Team.            Aug 15, 2017 10:40 AM CDT   SHORT with Dinorah Lorenzo, DO   [facility]    [address] [address] [phone]              Who to contact     If you have questions or need follow up information about today's clinic visit or your schedule please contact [facility] directly at [phone].  Normal or non-critical lab and imaging results will be communicated to you by MyChart, letter or phone within 4 business days after the clinic has received the results. If you do not hear from us within 7 days, please contact the clinic through MyChart or phone. If you have a critical or  abnormal lab result, we will notify you by phone as soon as possible.  Submit refill requests through [employer] or call your pharmacy and they will forward the refill request to us. Please allow 3 business days for your refill to be completed.          Additional Information About Your Visit        [employer] Information     [employer] gives you secure access to your electronic health record. If you see a primary care provider, you can also send messages to your care team and make appointments. If you have questions, please call your primary care clinic.  If you do not have a primary care provider, please call [phone] and they will assist you.        Care EveryWhere ID     This is your Care EveryWhere ID. This could be used by other organizations to access your [facility] medical records  [id number]        Your Vitals Were     Pulse Temperature Respirations Pulse Oximetry BMI (Body Mass Index)       70 97.1  F (36.2  C) (Tympanic) 16 98% 26.43 kg/m2        Blood Pressure from Last 3 Encounters:   07/21/17 108/66   07/07/17 103/61   07/05/17 108/64    Weight from Last 3 Encounters:   07/21/17 154 lb (69.9 kg)   07/07/17 156 lb (70.8 kg)   07/05/17 158 lb (71.7 kg)              We Performed the Following     Lyme Disease Tammi with reflex to WB Serum          Today's Medication Changes          These changes are accurate as of: 7/21/17  1:02 PM.  If you have any questions, ask your nurse or doctor.               These medicines have changed or have updated prescriptions.        Dose/Directions    atorvastatin 10 MG tablet   Commonly known as:  LIPITOR   This may have changed:    - how much to take  - how to take this  - when to take this  - additional instructions   Used for:  Coronary artery disease due to lipid rich plaque        Take 5mg (one-half tablet) twice a week   Quantity:  30 tablet   Refills:  3       estradiol 0.5 MG tablet   Commonly known as:  ESTRACE   This may have changed:    - how much to take  - how to  take this  - when to take this  - additional instructions   Used for:  Atrophic vaginitis        1 tab vaginallly twice a week   Quantity:  24 tablet   Refills:  3                Primary Care Provider Office Phone # Fax #    Dinorah Lorenzo [phone]       [facility] [address] [address]        Equal Access to Services     RIP MARTIN : Hadii aad ku hadasho Soomaali, waaxda luqadaha, qaybta kaalmada adeegyada, waxay idiin hayaan adeeg kharash latri ah. So [facility] [phone].    ATENCIÓN: Si habla español, tiene a garcia disposición servicios gratuitos de asistencia lingüística. Mauro al [phone].    We comply with applicable federal civil rights laws and [place] laws. We do not discriminate on the basis of race, color, national origin, age, disability sex, sexual orientation or gender identity.            Thank you!     Thank you for choosing [facility]  for your care. Our goal is always to provide you with excellent care. Hearing back from our patients is one way we can continue to improve our services. Please take a few minutes to complete the written survey that you may receive in the mail after your visit with us. Thank you!             Your Updated Medication List - Protect others around you: Learn how to safely use, store and throw away your medicines at www.disposemymeds.org.          This list is accurate as of: 7/21/17  1:02 PM.  Always use your most recent med list.                   Brand Name Dispense Instructions for use Diagnosis    amitriptyline 10 MG tablet    ELAVIL    90 tablet    Take 1 tablet (10 mg) by mouth At Bedtime    Bladder pain       aspirin 81 MG tablet      1 TABLET DAILY        atorvastatin 10 MG tablet    LIPITOR    30 tablet    Take 5mg (one-half tablet) twice a week    Coronary artery disease due to lipid rich plaque       BD ULTRA FINE PEN NEEDLES     1 Box    Inject 1 each Subcutaneous 6 times daily    Type 1  diabetes mellitus with hyperglycemia (H)       * blood glucose monitoring test strip    no brand specified    500 strip    Use as directed, up to 6 times a day. One Touch test strips    Type I (juvenile type) diabetes mellitus with other specified manifestations, not stated as uncontrolled       * blood glucose monitoring test strip    ONE TOUCH VERIO IQ    500 strip    Use to test blood sugar 6 times daily or as directed.    Type 1 diabetes mellitus with hyperglycemia (H)       cholecalciferol 1000 UNIT tablet    vitamin D    1 tablet    Take 1 tablet by mouth daily.        CINNAMON PO      Take 500 mg by mouth daily        CRANBERRY PO      Take 1 capsule by mouth daily        diazepam 2 MG tablet    VALIUM    30 tablet    Take 1 tablet (2 mg) by mouth nightly as needed for anxiety or sleep    Generalized anxiety disorder, Insomnia due to medical condition       doxycycline Monohydrate 100 MG Tabs     42 tablet    Take 100 mg by mouth 2 times daily for 21 days    Erythema migrans (Lyme disease)       estradiol 0.5 MG tablet    ESTRACE    24 tablet    1 tab vaginallly twice a week    Atrophic vaginitis       gabapentin 100 MG capsule    NEURONTIN    90 capsule    Take 1 capsule (100 mg) by mouth 3 times daily    Right facial pain       glucagon 1 MG kit    GLUCAGON EMERGENCY    1 mg    Inject 1 mg Subcutaneous once for 1 dose    Type 1 diabetes mellitus with hyperglycemia (H)       insulin aspart 100 UNITS/ML injection    NovoLOG    3 Month    1 unit per 12 grams of carb at meals, sliding scale: 150-190 +1, 191-230 +2, 231-270 +3, 271-310 +4, 311-350 +5, 351-390 +6, 391-430 +7, 431-470 +8    Type 1 diabetes, HbA1c goal < 8% (H)       insulin degludec 100 UNIT/ML pen    TRESIBA    15 mL    Take 17 units at bedtime (increase by 1 unit every 5 days till am readings are under 180)    Type 1 diabetes mellitus with microalbuminuria (H)       insulin glargine 100 UNIT/ML injection    LANTUS SOLOSTAR    9 mL    Inject 10  Units Subcutaneous 2 times daily    Type 1 diabetes mellitus with hyperglycemia (H)       losartan 25 MG tablet    COZAAR    45 tablet    Take 0.5 tablets (12.5 mg) by mouth daily    Type 1 diabetes mellitus with microalbuminuria (H)       magnesium 250 MG tablet      Take 1 tablet by mouth daily.        * methylphenidate 20 MG tablet    RITALIN    30 tablet    Take 1/2-1 tablet by mouth in the morning.    Hypersomnia       * methylphenidate 20 MG tablet    RITALIN    30 tablet    Take 0.5-1 tablets (10-20 mg) by mouth daily    Hypersomnia       metoprolol 25 MG 24 hr tablet    TOPROL-XL    90 tablet    Take 1 tablet (25 mg) by mouth daily    Benign essential hypertension       nitroGLYcerin 0.4 MG sublingual tablet    NITROSTAT    25 tablet    Place 1 tablet (0.4 mg) under the tongue every 5 minutes as needed for chest pain    CAD (coronary artery disease)       OMEGA-3 FATTY ACIDS PO      Take 500 mg by mouth daily        omeprazole 20 MG CR capsule    priLOSEC    90 capsule    Take 1 capsule (20 mg) by mouth daily    Gastroesophageal reflux disease without esophagitis       Zinc 15 MG Caps      Take 1 capsule by mouth daily        * Notice:  This list has 4 medication(s) that are the same as other medications prescribed for you. Read the directions carefully, and ask your doctor or other care provider to review them with you.

## 2017-07-21 NOTE — NURSING NOTE
"Chief Complaint   Patient presents with     RECHECK       Initial /66 (BP Location: Right arm, Patient Position: Chair, Cuff Size: Adult Regular)  Pulse 70  Temp 97.1  F (36.2  C) (Tympanic)  Resp 16  Wt 154 lb (69.9 kg)  SpO2 98%  BMI 26.43 kg/m2 Estimated body mass index is 26.43 kg/(m^2) as calculated from the following:    Height as of 6/23/17: 5' 4\" (1.626 m).    Weight as of this encounter: 154 lb (69.9 kg).  Medication Reconciliation: complete    Health Maintenance that is potentially due pending provider review:  PHQ9 and eye exam, lipids, microalbumin A1C    Has appointment scheduled with primary, will complete there.    Is there anyone who you would like to be able to receive your results? No  If yes have patient fill out MARTHA      "

## 2017-07-22 LAB — B BURGDOR IGG+IGM SER QL: 0.04 (ref 0–0.89)

## 2017-07-24 RX ORDER — METOPROLOL SUCCINATE 25 MG/1
TABLET, EXTENDED RELEASE ORAL
Qty: 90 TABLET | Refills: 0 | Status: SHIPPED | OUTPATIENT
Start: 2017-07-24 | End: 2017-08-15

## 2017-07-24 RX ORDER — AMITRIPTYLINE HYDROCHLORIDE 10 MG/1
TABLET ORAL
Qty: 90 TABLET | Refills: 0 | Status: SHIPPED | OUTPATIENT
Start: 2017-07-24 | End: 2017-08-15

## 2017-08-10 DIAGNOSIS — R80.9 TYPE 1 DIABETES MELLITUS WITH MICROALBUMINURIA (H): Primary | Chronic | ICD-10-CM

## 2017-08-10 DIAGNOSIS — E10.29 TYPE 1 DIABETES MELLITUS WITH MICROALBUMINURIA (H): Primary | Chronic | ICD-10-CM

## 2017-08-10 NOTE — TELEPHONE ENCOUNTER
Novolog        Last Written Prescription Date: 09/23/147  Last Fill Quantity: 3 mth, # refills: 3  Last Office Visit with [facility], [facility] or  Health prescribing provider:  07/21/17   Next 5 appointments (look out 90 days)     Aug 15, 2017 10:40 AM CDT   SHORT with Dinorah Lorenzo DO   [facility]    [address] [address] [phone]                   BP Readings from Last 3 Encounters:   07/21/17 108/66   07/07/17 103/61   07/05/17 108/64     Lab Results   Component Value Date    MICROL 6 06/11/2015     Lab Results   Component Value Date    UMALCR <10.0 06/28/2016     Creatinine   Date Value Ref Range Status   05/16/2017 0.86 0.52 - 1.04 mg/dL Final   ]  GFR Estimate   Date Value Ref Range Status   05/16/2017 69 >60 mL/min/1.7m2 Final     Comment:     Non  GFR Calc   07/07/2016 74 >60 mL/min/1.7m2 Final     Comment:     Non  GFR Calc   06/28/2016 >60 ml/min/1.73m2 Final     GFR Estimate If Black   Date Value Ref Range Status   05/16/2017 83 >60 mL/min/1.7m2 Final     Comment:      GFR Calc   07/07/2016 89 >60 mL/min/1.7m2 Final     Comment:      GFR Calc   06/28/2016 >60 ml/min/1.73m2 Final     Lab Results   Component Value Date    CHOL 175 06/25/2015     Lab Results   Component Value Date    HDL 83 06/25/2015     Lab Results   Component Value Date    LDL 84 06/28/2016     Lab Results   Component Value Date    TRIG 72 06/25/2015     Lab Results   Component Value Date    CHOLHDLRATIO 2.1 06/25/2015     Lab Results   Component Value Date    AST 20 05/16/2017     Lab Results   Component Value Date    ALT 29 05/16/2017     Lab Results   Component Value Date    A1C 9.9 05/09/2017    A1C 9.9 07/07/2016    A1C 9.2 06/28/2016    A1C 9.6 04/25/2016    A1C 9.1 11/16/2015     Potassium   Date Value Ref Range Status   05/16/2017 3.9 3.4 - 5.3 mmol/L Final

## 2017-08-11 ENCOUNTER — TELEPHONE (OUTPATIENT)
Dept: EDUCATION SERVICES | Facility: CLINIC | Age: 52
End: 2017-08-11

## 2017-08-11 ENCOUNTER — TELEPHONE (OUTPATIENT)
Dept: FAMILY MEDICINE | Facility: CLINIC | Age: 52
End: 2017-08-11

## 2017-08-11 NOTE — TELEPHONE ENCOUNTER
Received fax from [facility] to clarify how many units per day or max units per day for her novolog pen.  RN spoke with PCP - PCP requested RN ask patient this.  Patient states she averages 15 units/day.  This information was relayed to [facility] pharm.    Whitney TRIPATHI RN

## 2017-08-14 DIAGNOSIS — E10.65 TYPE 1 DIABETES MELLITUS WITH HYPERGLYCEMIA (H): Chronic | ICD-10-CM

## 2017-08-14 LAB
ANION GAP SERPL CALCULATED.3IONS-SCNC: 2 MMOL/L (ref 3–14)
BUN SERPL-MCNC: 20 MG/DL (ref 7–30)
CALCIUM SERPL-MCNC: 8.5 MG/DL (ref 8.5–10.1)
CHLORIDE SERPL-SCNC: 104 MMOL/L (ref 94–109)
CHOLEST SERPL-MCNC: 163 MG/DL
CO2 SERPL-SCNC: 32 MMOL/L (ref 20–32)
CREAT SERPL-MCNC: 0.67 MG/DL (ref 0.52–1.04)
CREAT UR-MCNC: 90 MG/DL
GFR SERPL CREATININE-BSD FRML MDRD: ABNORMAL ML/MIN/1.7M2
GLUCOSE SERPL-MCNC: 175 MG/DL (ref 70–99)
HBA1C MFR BLD: 9.3 % (ref 4.3–6)
HDLC SERPL-MCNC: 85 MG/DL
LDLC SERPL CALC-MCNC: 67 MG/DL
MICROALBUMIN UR-MCNC: 8 MG/L
MICROALBUMIN/CREAT UR: 9.33 MG/G CR (ref 0–25)
NONHDLC SERPL-MCNC: 78 MG/DL
POTASSIUM SERPL-SCNC: 3.9 MMOL/L (ref 3.4–5.3)
SODIUM SERPL-SCNC: 138 MMOL/L (ref 133–144)
TRIGL SERPL-MCNC: 55 MG/DL

## 2017-08-14 PROCEDURE — 80048 BASIC METABOLIC PNL TOTAL CA: CPT | Performed by: INTERNAL MEDICINE

## 2017-08-14 PROCEDURE — 80061 LIPID PANEL: CPT | Performed by: INTERNAL MEDICINE

## 2017-08-14 PROCEDURE — 83036 HEMOGLOBIN GLYCOSYLATED A1C: CPT | Performed by: INTERNAL MEDICINE

## 2017-08-14 PROCEDURE — 36415 COLL VENOUS BLD VENIPUNCTURE: CPT | Performed by: INTERNAL MEDICINE

## 2017-08-14 PROCEDURE — 82043 UR ALBUMIN QUANTITATIVE: CPT | Performed by: INTERNAL MEDICINE

## 2017-08-15 ENCOUNTER — OFFICE VISIT (OUTPATIENT)
Dept: FAMILY MEDICINE | Facility: CLINIC | Age: 52
End: 2017-08-15
Payer: MEDICARE

## 2017-08-15 VITALS
HEART RATE: 88 BPM | DIASTOLIC BLOOD PRESSURE: 68 MMHG | BODY MASS INDEX: 26.6 KG/M2 | SYSTOLIC BLOOD PRESSURE: 109 MMHG | HEIGHT: 64 IN | TEMPERATURE: 98.5 F | WEIGHT: 155.8 LBS

## 2017-08-15 DIAGNOSIS — E10.29 TYPE 1 DIABETES MELLITUS WITH MICROALBUMINURIA (H): Primary | Chronic | ICD-10-CM

## 2017-08-15 DIAGNOSIS — I10 BENIGN ESSENTIAL HYPERTENSION: ICD-10-CM

## 2017-08-15 DIAGNOSIS — I25.10 CORONARY ARTERY DISEASE DUE TO LIPID RICH PLAQUE: Chronic | ICD-10-CM

## 2017-08-15 DIAGNOSIS — K21.9 GASTROESOPHAGEAL REFLUX DISEASE WITHOUT ESOPHAGITIS: ICD-10-CM

## 2017-08-15 DIAGNOSIS — R39.89 BLADDER PAIN: ICD-10-CM

## 2017-08-15 DIAGNOSIS — I25.83 CORONARY ARTERY DISEASE DUE TO LIPID RICH PLAQUE: Chronic | ICD-10-CM

## 2017-08-15 DIAGNOSIS — R80.9 TYPE 1 DIABETES MELLITUS WITH MICROALBUMINURIA (H): Primary | Chronic | ICD-10-CM

## 2017-08-15 DIAGNOSIS — G47.10 HYPERSOMNIA: ICD-10-CM

## 2017-08-15 PROCEDURE — 99214 OFFICE O/P EST MOD 30 MIN: CPT | Performed by: INTERNAL MEDICINE

## 2017-08-15 RX ORDER — METHYLPHENIDATE HYDROCHLORIDE 20 MG/1
10-20 TABLET ORAL DAILY
Qty: 30 TABLET | Refills: 0 | Status: SHIPPED | OUTPATIENT
Start: 2017-10-14 | End: 2017-11-09

## 2017-08-15 RX ORDER — LOSARTAN POTASSIUM 25 MG/1
12.5 TABLET ORAL DAILY
Qty: 45 TABLET | Refills: 3 | Status: SHIPPED | OUTPATIENT
Start: 2017-08-15 | End: 2019-01-04

## 2017-08-15 RX ORDER — AMITRIPTYLINE HYDROCHLORIDE 10 MG/1
10 TABLET ORAL AT BEDTIME
Qty: 90 TABLET | Refills: 3 | Status: SHIPPED | OUTPATIENT
Start: 2017-08-15 | End: 2019-04-17

## 2017-08-15 RX ORDER — METHYLPHENIDATE HYDROCHLORIDE 20 MG/1
10-20 TABLET ORAL DAILY
Qty: 30 TABLET | Refills: 0 | Status: SHIPPED | OUTPATIENT
Start: 2017-11-13 | End: 2017-12-13

## 2017-08-15 RX ORDER — METHYLPHENIDATE HYDROCHLORIDE 20 MG/1
10-20 TABLET ORAL DAILY
Qty: 30 TABLET | Refills: 0 | Status: SHIPPED | OUTPATIENT
Start: 2017-08-15 | End: 2017-11-09

## 2017-08-15 RX ORDER — ATORVASTATIN CALCIUM 10 MG/1
TABLET, FILM COATED ORAL
Qty: 30 TABLET | Refills: 3 | Status: SHIPPED | OUTPATIENT
Start: 2017-08-15 | End: 2018-08-24

## 2017-08-15 RX ORDER — METOPROLOL SUCCINATE 25 MG/1
25 TABLET, EXTENDED RELEASE ORAL DAILY
Qty: 90 TABLET | Refills: 3 | Status: SHIPPED | OUTPATIENT
Start: 2017-08-15 | End: 2018-08-24 | Stop reason: SINTOL

## 2017-08-15 RX ORDER — METHYLPHENIDATE HYDROCHLORIDE 20 MG/1
10-20 TABLET ORAL DAILY
Qty: 30 TABLET | Refills: 0 | Status: SHIPPED | OUTPATIENT
Start: 2017-09-14 | End: 2017-10-14

## 2017-08-15 ASSESSMENT — ANXIETY QUESTIONNAIRES
5. BEING SO RESTLESS THAT IT IS HARD TO SIT STILL: NOT AT ALL
2. NOT BEING ABLE TO STOP OR CONTROL WORRYING: NOT AT ALL
3. WORRYING TOO MUCH ABOUT DIFFERENT THINGS: NOT AT ALL
1. FEELING NERVOUS, ANXIOUS, OR ON EDGE: SEVERAL DAYS
6. BECOMING EASILY ANNOYED OR IRRITABLE: SEVERAL DAYS
7. FEELING AFRAID AS IF SOMETHING AWFUL MIGHT HAPPEN: NOT AT ALL
GAD7 TOTAL SCORE: 2

## 2017-08-15 ASSESSMENT — PATIENT HEALTH QUESTIONNAIRE - PHQ9
5. POOR APPETITE OR OVEREATING: NOT AT ALL
SUM OF ALL RESPONSES TO PHQ QUESTIONS 1-9: 2

## 2017-08-15 NOTE — TELEPHONE ENCOUNTER
Dr. Lorenzo,    You should be receiving some paperwork to sign off on for Glory from [employer] for her sensor to see BG values at all times.  Please let me know if you have any questions. [address], CDE

## 2017-08-15 NOTE — PATIENT INSTRUCTIONS
Thank you for choosing [facility].  You may be receiving a survey in the mail from [employer] regarding your visit today.  Please take a few minutes to complete and return the survey to let us know how we are doing.      If you have questions or concerns, please contact us via [employer] or you can contact your care team at [phone].    Our Clinic hours are:  Monday 6:40 am  to 7:00 pm  Tuesday -Friday 6:40 am to 5:00 pm    The [place] outpatient lab hours are:  Monday - Friday 6:10 am to 4:45 pm  Saturdays 7:00 am to 11:00 am  Appointments are required, call [phone]    If you have clinical questions after hours or would like to schedule an appointment,  call the clinic at [phone].      1. Check with [employer] about resending paperwork for what they need.  2. See DR. Lorenzo once prior to leaving for [place]  3. Refills of Ritalin given through mid-Dec

## 2017-08-15 NOTE — NURSING NOTE
"Chief Complaint   Patient presents with     Chronic Disease Management     Diabetes, CAD, Thyroid, Depression/Anxiety Follow up     Refill Request     meds pended        Initial /68 (BP Location: Left arm, Patient Position: Chair, Cuff Size: Adult Regular)  Pulse 88  Temp 98.5  F (36.9  C) (Tympanic)  Ht 5' 4\" (1.626 m)  Wt 155 lb 12.8 oz (70.7 kg)  BMI 26.74 kg/m2 Estimated body mass index is 26.74 kg/(m^2) as calculated from the following:    Height as of this encounter: 5' 4\" (1.626 m).    Weight as of this encounter: 155 lb 12.8 oz (70.7 kg).  Medication Reconciliation: complete    "

## 2017-08-16 ASSESSMENT — ANXIETY QUESTIONNAIRES: GAD7 TOTAL SCORE: 2

## 2017-08-17 ENCOUNTER — TELEPHONE (OUTPATIENT)
Dept: FAMILY MEDICINE | Facility: CLINIC | Age: 52
End: 2017-08-17

## 2017-08-17 NOTE — TELEPHONE ENCOUNTER
Reason for Call:  Other call back    Detailed comments: Patient is calling stating she has checked with [employer], and they have never received our paperwork, completed by Dr. Lorenzo. THey will be faxing a new set of forms today. Please fax to:[phone].    Phone Number Patient can be reached at: Home number on file [phone] (home)    Best Time: [place]    Can we leave a detailed message on this number? YES    Call taken on 8/17/2017 at 1:48 PM by Madeline De  Please call patient when this gets faxed back to [employer]

## 2017-08-17 NOTE — TELEPHONE ENCOUNTER
I have looked and do not see that we got this form.  Patient is called and she is going to check and make sure [employer] has the correct fax number and fax again. Stephanie FERNANDEZ RN

## 2017-08-20 ENCOUNTER — MEDICAL CORRESPONDENCE (OUTPATIENT)
Dept: HEALTH INFORMATION MANAGEMENT | Facility: CLINIC | Age: 52
End: 2017-08-20

## 2017-08-25 ENCOUNTER — TELEPHONE (OUTPATIENT)
Dept: FAMILY MEDICINE | Facility: CLINIC | Age: 52
End: 2017-08-25

## 2017-08-25 NOTE — TELEPHONE ENCOUNTER
Reason for Call:  Other prescription    Detailed comments: [facility] called saying that they can only get 90 days of Ritalin at a time.  She was given 4 Rx.  The Rx for 11/13/2017 will be shredded by the pharmacy.    Phone Number Patient can be reached at: Other phone number:  [facility] [phone]    Best Time: any    Can we leave a detailed message on this number? YES    Call taken on 8/25/2017 at 12:12 PM by Sasha Snyder

## 2017-08-28 NOTE — TELEPHONE ENCOUNTER
Patient notified of message from provider regarding Ritalin  Patient verbalized understanding and agreed with MD's plan    Nakia TRIPATHI Rn

## 2017-09-25 ENCOUNTER — TELEPHONE (OUTPATIENT)
Dept: FAMILY MEDICINE | Facility: CLINIC | Age: 52
End: 2017-09-25

## 2017-09-28 ENCOUNTER — TELEPHONE (OUTPATIENT)
Dept: FAMILY MEDICINE | Facility: CLINIC | Age: 52
End: 2017-09-28

## 2017-09-28 ENCOUNTER — MEDICAL CORRESPONDENCE (OUTPATIENT)
Dept: HEALTH INFORMATION MANAGEMENT | Facility: CLINIC | Age: 52
End: 2017-09-28

## 2017-09-28 NOTE — TELEPHONE ENCOUNTER
[facility] Medical Necessity. Orders were signed, faxed and sent to scanning.    [facility] [place]

## 2017-10-30 ENCOUNTER — ALLIED HEALTH/NURSE VISIT (OUTPATIENT)
Dept: FAMILY MEDICINE | Facility: CLINIC | Age: 52
End: 2017-10-30
Payer: MEDICARE

## 2017-10-30 DIAGNOSIS — N95.2 ATROPHIC VAGINITIS: ICD-10-CM

## 2017-10-30 DIAGNOSIS — Z23 NEED FOR PROPHYLACTIC VACCINATION AND INOCULATION AGAINST INFLUENZA: Primary | ICD-10-CM

## 2017-10-30 PROCEDURE — G0008 ADMIN INFLUENZA VIRUS VAC: HCPCS

## 2017-10-30 PROCEDURE — 99207 ZZC NO CHARGE NURSE ONLY: CPT

## 2017-10-30 PROCEDURE — 90686 IIV4 VACC NO PRSV 0.5 ML IM: CPT

## 2017-10-30 NOTE — MR AVS SNAPSHOT
After Visit Summary   10/30/2017    Glory Birmingham    MRN: [id number]           Patient Information     Date Of Birth          1965        Visit Information        Provider Department      10/30/2017 11:00 AM [place] PI JIMMY/[facility]        Today's Diagnoses     Need for prophylactic vaccination and inoculation against influenza    -  1       Follow-ups after your visit        Your next 10 appointments already scheduled     Nov 10, 2017 10:00 AM CST   [facility]   [facility]    [address] [facility] [id number] [phone]           Patient must bring picture ID. Patient should be prepared to give a urine specimen  Please do not eat 10-12 hours before your appointment if you are coming in fasting for labs on lipids, cholesterol, or glucose (sugar). Pregnant women should follow their Care Team instructions. Water with medications is okay. Do not drink coffee or other fluids. If you have concerns about taking  your medications, please ask at office or if scheduling via [employer], send a message by clicking on Secure Messaging, Message Your Care Team.            Nov 14, 2017 11:00 AM CST   Office Visit with Dinorah Lorenzo,    [facility]    [address] [facility] [address] [phone]           Bring a current list of meds and any records pertaining to this visit. For Physicals, please bring immunization records and any forms needing to be filled out. Please arrive 10 minutes early to complete paperwork.              Who to contact     If you have questions or need follow up information about today's clinic visit or your schedule please contact [facility] directly at [phone].  Normal or non-critical lab and imaging results will be communicated to you by MyChart, letter or phone within 4 business days after the clinic has received the  results. If you do not hear from us within 7 days, please contact the clinic through [employer] or phone. If you have a critical or abnormal lab result, we will notify you by phone as soon as possible.  Submit refill requests through [employer] or call your pharmacy and they will forward the refill request to us. Please allow 3 business days for your refill to be completed.          Additional Information About Your Visit        [employer] Information     [employer] gives you secure access to your electronic health record. If you see a primary care provider, you can also send messages to your care team and make appointments. If you have questions, please call your primary care clinic.  If you do not have a primary care provider, please call [phone] and they will assist you.        Care EveryWhere ID     This is your Care EveryWhere ID. This could be used by other organizations to access your [place] medical records  [id number]         Blood Pressure from Last 3 Encounters:   08/15/17 109/68   07/21/17 108/66   07/07/17 103/61    Weight from Last 3 Encounters:   08/15/17 155 lb 12.8 oz (70.7 kg)   07/21/17 154 lb (69.9 kg)   07/07/17 156 lb (70.8 kg)              We Performed the Following     FLU VAC, SPLIT VIRUS IM > 3 YO (QUADRIVALENT) [60429]     Vaccine Administration, Initial [71630]          Today's Medication Changes          These changes are accurate as of: 10/30/17 11:46 AM.  If you have any questions, ask your nurse or doctor.               These medicines have changed or have updated prescriptions.        Dose/Directions    estradiol 0.5 MG tablet   Commonly known as:  ESTRACE   This may have changed:    - how much to take  - how to take this  - when to take this  - additional instructions   Used for:  Atrophic vaginitis        1 tab vaginallly twice a week   Quantity:  24 tablet   Refills:  3                Primary Care Provider Office Phone # Fax #    Dinorahjessica Lorenzo [phone]        [address] [facility] [address]        Equal Access to Services     RIP MARTIN : Hadii aad ku hadblancadestiny Silva, wanedda christos, qasonielis graganahijohn espinoza, ana paula younglaceygrant oshea. So [facility] [phone].    ATENCIÓN: Si habla español, tiene a garcia disposición servicios gratuitos de asistencia lingüística. Llame al [phone].    We comply with applicable federal civil rights laws and [place] laws. We do not discriminate on the basis of race, color, national origin, age, disability, sex, sexual orientation, or gender identity.            Thank you!     Thank you for choosing [facility]  for your care. Our goal is always to provide you with excellent care. Hearing back from our patients is one way we can continue to improve our services. Please take a few minutes to complete the written survey that you may receive in the mail after your visit with us. Thank you!             Your Updated Medication List - Protect others around you: Learn how to safely use, store and throw away your medicines at www.disposemymeds.org.          This list is accurate as of: 10/30/17 11:46 AM.  Always use your most recent med list.                   Brand Name Dispense Instructions for use Diagnosis    amitriptyline 10 MG tablet    ELAVIL    90 tablet    Take 1 tablet (10 mg) by mouth At Bedtime    Bladder pain       aspirin 81 MG tablet      1 TABLET DAILY        atorvastatin 10 MG tablet    LIPITOR    30 tablet    Take 5mg (one-half tablet) twice a week    Coronary artery disease due to lipid rich plaque       BD ULTRA FINE PEN NEEDLES     1 Box    Inject 1 each Subcutaneous 6 times daily    Type 1 diabetes mellitus with hyperglycemia (H)       * blood glucose monitoring test strip    no brand specified    500 strip    Use as directed, up to 6 times a day. One Touch test strips    Type I (juvenile type) diabetes mellitus with other specified manifestations, not stated as uncontrolled       *  blood glucose monitoring test strip    ONE TOUCH VERIO IQ    500 strip    Use to test blood sugar 6 times daily or as directed.    Type 1 diabetes mellitus with hyperglycemia (H)       cholecalciferol 1000 UNIT tablet    vitamin D3    1 tablet    Take 1 tablet by mouth daily.        CINNAMON PO      Take 500 mg by mouth daily        CRANBERRY PO      Take 1 capsule by mouth daily        diazepam 2 MG tablet    VALIUM    30 tablet    Take 1 tablet (2 mg) by mouth nightly as needed for anxiety or sleep    Generalized anxiety disorder, Insomnia due to medical condition       estradiol 0.5 MG tablet    ESTRACE    24 tablet    1 tab vaginallly twice a week    Atrophic vaginitis       glucagon 1 MG kit    GLUCAGON EMERGENCY    1 mg    Inject 1 mg Subcutaneous once for 1 dose    Type 1 diabetes mellitus with hyperglycemia (H)       insulin aspart 100 UNIT/ML injection    NovoLOG PEN    15 mL    1 unit per 12 grams of carb at meals, sliding scale: 150-190 +1, 191-230 +2, 231-270 +3, 271-310 +4, 311-350 +5, 351-390 +6, 391-430 +7, 431-470 +8    Type 1 diabetes mellitus with microalbuminuria (H)       insulin degludec 100 UNIT/ML pen    TRESIBA    15 mL    Inject 15 Units Subcutaneous At Bedtime    Type 1 diabetes mellitus with microalbuminuria (H)       losartan 25 MG tablet    COZAAR    45 tablet    Take 0.5 tablets (12.5 mg) by mouth daily    Type 1 diabetes mellitus with microalbuminuria (H)       magnesium 250 MG tablet      Take 1 tablet by mouth daily.        * methylphenidate 20 MG tablet    RITALIN    30 tablet    Take 0.5-1 tablets (10-20 mg) by mouth daily    Hypersomnia       * methylphenidate 20 MG tablet    RITALIN    30 tablet    Take 0.5-1 tablets (10-20 mg) by mouth daily    Hypersomnia       * methylphenidate 20 MG tablet   Start taking on:  11/13/2017    RITALIN    30 tablet    Take 0.5-1 tablets (10-20 mg) by mouth daily    Hypersomnia       metoprolol 25 MG 24 hr tablet    TOPROL-XL    90 tablet    Take 1  tablet (25 mg) by mouth daily    Benign essential hypertension       nitroGLYcerin 0.4 MG sublingual tablet    NITROSTAT    25 tablet    Place 1 tablet (0.4 mg) under the tongue every 5 minutes as needed for chest pain    CAD (coronary artery disease)       OMEGA-3 FATTY ACIDS PO      Take 500 mg by mouth daily        omeprazole 20 MG CR capsule    priLOSEC    90 capsule    Take 1 capsule (20 mg) by mouth daily    Gastroesophageal reflux disease without esophagitis       Zinc 15 MG Caps      Take 1 capsule by mouth daily        * Notice:  This list has 5 medication(s) that are the same as other medications prescribed for you. Read the directions carefully, and ask your doctor or other care provider to review them with you.

## 2017-10-30 NOTE — TELEPHONE ENCOUNTER
estradiol (ESTRACE) 0.5 MG tablet      Last Written Prescription Date: 8/6/16  Last Fill Quantity: 24,  # refills: 3   Last Office Visit with [facility], [facility] or [facility] prescribing provider: 8/15/17                                         Next 5 appointments (look out 90 days)     Nov 14, 2017 11:00 AM CST   [facility] with Dinorah [facility] DO   [facility]    [address] [facility] [address]   [phone]

## 2017-11-01 RX ORDER — ESTRADIOL 0.5 MG/1
TABLET ORAL
Qty: 24 TABLET | Refills: 3 | Status: SHIPPED | OUTPATIENT
Start: 2017-11-01 | End: 2019-01-04

## 2017-11-06 DIAGNOSIS — E03.8 SUBCLINICAL HYPOTHYROIDISM: Chronic | ICD-10-CM

## 2017-11-06 DIAGNOSIS — R80.9 TYPE 1 DIABETES MELLITUS WITH MICROALBUMINURIA (H): Chronic | ICD-10-CM

## 2017-11-06 DIAGNOSIS — E10.29 TYPE 1 DIABETES MELLITUS WITH MICROALBUMINURIA (H): Chronic | ICD-10-CM

## 2017-11-06 LAB — HBA1C MFR BLD: 10.2 % (ref 4.3–6)

## 2017-11-06 PROCEDURE — 36415 COLL VENOUS BLD VENIPUNCTURE: CPT | Performed by: INTERNAL MEDICINE

## 2017-11-06 PROCEDURE — 84443 ASSAY THYROID STIM HORMONE: CPT | Performed by: INTERNAL MEDICINE

## 2017-11-06 PROCEDURE — 83036 HEMOGLOBIN GLYCOSYLATED A1C: CPT | Performed by: INTERNAL MEDICINE

## 2017-11-06 PROCEDURE — 84439 ASSAY OF FREE THYROXINE: CPT | Performed by: INTERNAL MEDICINE

## 2017-11-07 LAB
T4 FREE SERPL-MCNC: 0.91 NG/DL (ref 0.76–1.46)
TSH SERPL DL<=0.005 MIU/L-ACNC: 9.09 MU/L (ref 0.4–4)

## 2017-11-08 ENCOUNTER — MYC REFILL (OUTPATIENT)
Dept: FAMILY MEDICINE | Facility: CLINIC | Age: 52
End: 2017-11-08

## 2017-11-08 DIAGNOSIS — E10.65 TYPE 1 DIABETES MELLITUS WITH HYPERGLYCEMIA (H): Chronic | ICD-10-CM

## 2017-11-08 DIAGNOSIS — G47.01 INSOMNIA DUE TO MEDICAL CONDITION: ICD-10-CM

## 2017-11-08 DIAGNOSIS — F41.1 GENERALIZED ANXIETY DISORDER: ICD-10-CM

## 2017-11-08 RX ORDER — DIAZEPAM 2 MG
2 TABLET ORAL
Qty: 30 TABLET | Refills: 5 | Status: CANCELLED | OUTPATIENT
Start: 2017-11-08

## 2017-11-09 ENCOUNTER — OFFICE VISIT (OUTPATIENT)
Dept: FAMILY MEDICINE | Facility: CLINIC | Age: 52
End: 2017-11-09
Payer: MEDICARE

## 2017-11-09 VITALS
HEIGHT: 64 IN | HEART RATE: 93 BPM | SYSTOLIC BLOOD PRESSURE: 107 MMHG | BODY MASS INDEX: 26.36 KG/M2 | TEMPERATURE: 97.6 F | DIASTOLIC BLOOD PRESSURE: 64 MMHG | WEIGHT: 154.4 LBS

## 2017-11-09 DIAGNOSIS — G47.01 INSOMNIA DUE TO MEDICAL CONDITION: ICD-10-CM

## 2017-11-09 DIAGNOSIS — E03.8 SUBCLINICAL HYPOTHYROIDISM: ICD-10-CM

## 2017-11-09 DIAGNOSIS — E10.649 HYPOGLYCEMIA UNAWARENESS IN TYPE 1 DIABETES MELLITUS (H): ICD-10-CM

## 2017-11-09 DIAGNOSIS — F41.1 GENERALIZED ANXIETY DISORDER: ICD-10-CM

## 2017-11-09 DIAGNOSIS — E10.29 TYPE 1 DIABETES MELLITUS WITH MICROALBUMINURIA (H): Primary | Chronic | ICD-10-CM

## 2017-11-09 DIAGNOSIS — E10.65 TYPE 1 DIABETES MELLITUS WITH HYPERGLYCEMIA (H): Primary | Chronic | ICD-10-CM

## 2017-11-09 DIAGNOSIS — R80.9 TYPE 1 DIABETES MELLITUS WITH MICROALBUMINURIA (H): Primary | Chronic | ICD-10-CM

## 2017-11-09 PROCEDURE — 99214 OFFICE O/P EST MOD 30 MIN: CPT | Performed by: INTERNAL MEDICINE

## 2017-11-09 RX ORDER — DIAZEPAM 2 MG
2 TABLET ORAL
Qty: 30 TABLET | Refills: 5 | Status: SHIPPED | OUTPATIENT
Start: 2017-11-09 | End: 2018-05-17

## 2017-11-09 NOTE — MR AVS SNAPSHOT
After Visit Summary   11/9/2017    Glory Birmingham    MRN: [id number]           Patient Information     Date Of Birth          1965        Visit Information        Provider Department      11/9/2017 10:40 AM Dinorah Lorenzo,  [facility]        Today's Diagnoses     Type 1 diabetes mellitus with hyperglycemia (H)    -  1    Hypoglycemia unawareness in type 1 diabetes mellitus (H)        Generalized anxiety disorder        Insomnia due to medical condition        Thyroid function test abnormal          Care Instructions    1. Follow-up with Ryanne in 2-4 weeks.  Will plan to increase both insulins after given the CGM a few weeks to give you more readings and make real time adjustments.          Follow-ups after your visit        Future tests that were ordered for you today     Open Future Orders        Priority Expected Expires Ordered    **TSH with free T4 reflex FUTURE 6mo Routine 5/8/2018 6/7/2018 11/9/2017    Hemoglobin A1c Routine 5/10/2018 8/10/2018 11/9/2017            Who to contact     If you have questions or need follow up information about today's clinic visit or your schedule please contact [facility] directly at [phone].  Normal or non-critical lab and imaging results will be communicated to you by [employer], letter or phone within 4 business days after the clinic has received the results. If you do not hear from us within 7 days, please contact the clinic through [employer] or phone. If you have a critical or abnormal lab result, we will notify you by phone as soon as possible.  Submit refill requests through [employer] or call your pharmacy and they will forward the refill request to us. Please allow 3 business days for your refill to be completed.          Additional Information About Your Visit        [employer] Information     [employer] gives you secure access to your electronic health record. If you see a primary care provider, you can also send messages to  "your care team and make appointments. If you have questions, please call your primary care clinic.  If you do not have a primary care provider, please call [phone] and they will assist you.        Care EveryWhere ID     This is your Care EveryWhere ID. This could be used by other organizations to access your [place] medical records  [id number]        Your Vitals Were     Pulse Temperature Height BMI (Body Mass Index)          93 97.6  F (36.4  C) (Tympanic) 5' 4\" (1.626 m) 26.5 kg/m2         Blood Pressure from Last 3 Encounters:   11/09/17 107/64   08/15/17 109/68   07/21/17 108/66    Weight from Last 3 Encounters:   11/09/17 154 lb 6.4 oz (70 kg)   08/15/17 155 lb 12.8 oz (70.7 kg)   07/21/17 154 lb (69.9 kg)                 Where to get your medicines      These medications were sent to [facility] [address] [place] [address]  [address] [place] [address]     Phone:  [phone]     BD ULTRA FINE PEN NEEDLES         Some of these will need a paper prescription and others can be bought over the counter.  Ask your nurse if you have questions.     Bring a paper prescription for each of these medications     diazepam 2 MG tablet          Primary Care Provider Office Phone # Fax #    Dinorah Fulton DO Bj [phone] [address] [facility] [address]        Equal Access to Services     RIP MARTIN AH: Hadii ambrocio koch hadasho Soomaali, waaxda luqadaha, qaybta kaalmada pawelyajohn, ana paula oshea. So [facility] [phone].    ATENCIÓN: Si habla merly, tiene a garcia disposición servicios gratuitos de asistencia lingüística. Mauro al [phone].    We comply with applicable federal civil rights laws and [place] laws. We do not discriminate on the basis of race, color, national origin, age, disability, sex, sexual orientation, or gender identity.            Thank you!     Thank you for choosing [facility]  for your care. Our goal " is always to provide you with excellent care. Hearing back from our patients is one way we can continue to improve our services. Please take a few minutes to complete the written survey that you may receive in the mail after your visit with us. Thank you!             Your Updated Medication List - Protect others around you: Learn how to safely use, store and throw away your medicines at www.disposemymeds.org.          This list is accurate as of: 11/9/17 11:20 AM.  Always use your most recent med list.                   Brand Name Dispense Instructions for use Diagnosis    amitriptyline 10 MG tablet    ELAVIL    90 tablet    Take 1 tablet (10 mg) by mouth At Bedtime    Bladder pain       aspirin 81 MG tablet      1 TABLET DAILY        atorvastatin 10 MG tablet    LIPITOR    30 tablet    Take 5mg (one-half tablet) twice a week    Coronary artery disease due to lipid rich plaque       BD ULTRA FINE PEN NEEDLES     1 Box    Inject 1 each Subcutaneous 6 times daily    Type 1 diabetes mellitus with hyperglycemia (H)       * blood glucose monitoring test strip    no brand specified    500 strip    Use as directed, up to 6 times a day. One Touch test strips    Type I (juvenile type) diabetes mellitus with other specified manifestations, not stated as uncontrolled       * blood glucose monitoring test strip    ONETOUCH VERIO IQ    500 strip    Use to test blood sugar 6 times daily or as directed.    Type 1 diabetes mellitus with hyperglycemia (H)       cholecalciferol 1000 UNIT tablet    vitamin D3    1 tablet    Take 1 tablet by mouth daily.        CINNAMON PO      Take 500 mg by mouth daily        CRANBERRY PO      Take 1 capsule by mouth daily        diazepam 2 MG tablet    VALIUM    30 tablet    Take 1 tablet (2 mg) by mouth nightly as needed for anxiety or sleep    Generalized anxiety disorder, Insomnia due to medical condition       estradiol 0.5 MG tablet    ESTRACE    24 tablet    1 tab vaginallly twice a week     Atrophic vaginitis       glucagon 1 MG kit    GLUCAGON EMERGENCY    1 mg    Inject 1 mg Subcutaneous once for 1 dose    Type 1 diabetes mellitus with hyperglycemia (H)       insulin aspart 100 UNIT/ML injection    NovoLOG PEN    15 mL    1 unit per 12 grams of carb at meals, sliding scale: 150-190 +1, 191-230 +2, 231-270 +3, 271-310 +4, 311-350 +5, 351-390 +6, 391-430 +7, 431-470 +8    Type 1 diabetes mellitus with microalbuminuria (H)       insulin degludec 100 UNIT/ML pen    TRESIBA    15 mL    Inject 15 Units Subcutaneous At Bedtime    Type 1 diabetes mellitus with microalbuminuria (H)       losartan 25 MG tablet    COZAAR    45 tablet    Take 0.5 tablets (12.5 mg) by mouth daily    Type 1 diabetes mellitus with microalbuminuria (H)       magnesium 250 MG tablet      Take 1 tablet by mouth daily.        methylphenidate 20 MG tablet   Start taking on:  11/13/2017    RITALIN    30 tablet    Take 0.5-1 tablets (10-20 mg) by mouth daily    Hypersomnia       metoprolol 25 MG 24 hr tablet    TOPROL-XL    90 tablet    Take 1 tablet (25 mg) by mouth daily    Benign essential hypertension       nitroGLYcerin 0.4 MG sublingual tablet    NITROSTAT    25 tablet    Place 1 tablet (0.4 mg) under the tongue every 5 minutes as needed for chest pain    CAD (coronary artery disease)       OMEGA-3 FATTY ACIDS PO      Take 500 mg by mouth daily        omeprazole 20 MG CR capsule    priLOSEC    90 capsule    Take 1 capsule (20 mg) by mouth daily    Gastroesophageal reflux disease without esophagitis       Zinc 15 MG Caps      Take 1 capsule by mouth daily        * Notice:  This list has 2 medication(s) that are the same as other medications prescribed for you. Read the directions carefully, and ask your doctor or other care provider to review them with you.

## 2017-11-09 NOTE — TELEPHONE ENCOUNTER
Routing refill request to provider for review/approval because:  Drug not on the [facility] refill protocol     Routing to provider.    Nakia TRIPATHI Rn

## 2017-11-09 NOTE — NURSING NOTE
"Chief Complaint   Patient presents with     Diabetes     Pt here for diabetes check.       Initial /64  Pulse 93  Temp 97.6  F (36.4  C) (Tympanic)  Ht 5' 4\" (1.626 m)  Wt 154 lb 6.4 oz (70 kg)  BMI 26.5 kg/m2 Estimated body mass index is 26.5 kg/(m^2) as calculated from the following:    Height as of this encounter: 5' 4\" (1.626 m).    Weight as of this encounter: 154 lb 6.4 oz (70 kg).  Medication Reconciliation: complete  Miley Hernandez CMA    "

## 2017-11-09 NOTE — PROGRESS NOTES
SUBJECTIVE:   Glory Birmingham is a 52 year old female who presents to clinic today for the following health issues:  Chief Complaint   Patient presents with     Diabetes     Pt here for diabetes check.       Diabetes Follow-up    Patient is checking blood sugars: twice daily in the past few days. Is doing continuous glucose monitor   Blood sugar testing frequency justification: Uncontrolled diabetes  Results are as follows:       am - all over the place       bedtime - all over the place    Diabetic concerns: blood sugar frequently over 200 and other - some low readings on occasion     Symptoms of hypoglycemia (low blood sugar): no symptoms if this happens     Paresthesias (numbness or burning in feet) or sores: Yes has severe neuropathy    Date of last diabetic eye exam: 11/7/17    Amount of exercise or physical activity: slow walking on treadmill, 15 minutes every other day    Problems taking medications regularly: No    Medication side effects: none    Diet: carbohydrate counting    The CGM just arrived a few days ago.  She feels in the last few days since getting CGM she is able to better adjust insulin.    tresiba 15 units HS    aspart 1 unit per 12 carbs, and sliding scale.    No hypoglycemia overnight, fasting blood sugar are controlled per her report.  However, when looking at her meter, fasting blood sugar are consistently > 200.    Having higher blood sugar during the day.    Has hypoglycemia unawareness.  They are often in AM, 60s.  She had one episode of bs 35s when waking up and this terrified her.    She plans to stay in contact with diabetes Ed while in [place] to continue to adjust blood sugar.    Is leaving for [place] in 1 week.              Current Outpatient Prescriptions   Medication Sig Dispense Refill     estradiol (ESTRACE) 0.5 MG tablet 1 tab vaginallly twice a week 24 tablet 3     metoprolol (TOPROL-XL) 25 MG 24 hr tablet Take 1 tablet (25 mg) by mouth daily 90 tablet 3     amitriptyline  (ELAVIL) 10 MG tablet Take 1 tablet (10 mg) by mouth At Bedtime 90 tablet 3     losartan (COZAAR) 25 MG tablet Take 0.5 tablets (12.5 mg) by mouth daily 45 tablet 3     atorvastatin (LIPITOR) 10 MG tablet Take 5mg (one-half tablet) twice a week 30 tablet 3     omeprazole (PRILOSEC) 20 MG CR capsule Take 1 capsule (20 mg) by mouth daily 90 capsule 3     [START ON 11/13/2017] methylphenidate (RITALIN) 20 MG tablet Take 0.5-1 tablets (10-20 mg) by mouth daily 30 tablet 0     insulin degludec (TRESIBA) 100 UNIT/ML pen Inject 15 Units Subcutaneous At Bedtime 15 mL 3     insulin aspart (NOVOLOG PEN) 100 UNIT/ML injection 1 unit per 12 grams of carb at meals, sliding scale: 150-190 +1, 191-230 +2, 231-270 +3, 271-310 +4, 311-350 +5, 351-390 +6, 391-430 +7, 431-470 +8 15 mL 0     CINNAMON PO Take 500 mg by mouth daily       CRANBERRY PO Take 1 capsule by mouth daily       diazepam (VALIUM) 2 MG tablet Take 1 tablet (2 mg) by mouth nightly as needed for anxiety or sleep 30 tablet 5     OMEGA-3 FATTY ACIDS PO Take 500 mg by mouth daily        magnesium 250 MG tablet Take 1 tablet by mouth daily.       Zinc 15 MG CAPS Take 1 capsule by mouth daily        cholecalciferol (VITAMIN D) 1000 UNIT tablet Take 1 tablet by mouth daily. 1 tablet 0     ASPIRIN 81 MG OR TABS 1 TABLET DAILY       blood glucose monitoring (ONE TOUCH VERIO IQ) test strip Use to test blood sugar 6 times daily or as directed. 500 strip 3     glucagon (GLUCAGON EMERGENCY) 1 MG injection Inject 1 mg Subcutaneous once for 1 dose 1 mg 1     BD ULTRA FINE PEN NEEDLES Inject 1 each Subcutaneous 6 times daily 1 Box 3     nitroglycerin (NITROSTAT) 0.4 MG SL tablet Place 1 tablet (0.4 mg) under the tongue every 5 minutes as needed for chest pain 25 tablet 3     blood glucose test strip Use as directed, up to 6 times a day. One Touch test strips 500 strip 2     [DISCONTINUED] BD ULTRA FINE PEN NEEDLES Inject 1 each Subcutaneous 3 times daily. 1 Box 11       Reviewed  "and updated as needed this visit by clinical staffTobacco  Allergies  Meds  Problems  Med Hx  Surg Hx  Fam Hx  Soc Hx        Reviewed and updated as needed this visit by Provider  Allergies  Meds  Problems         ROS:  Constitutional, HEENT, cardiovascular, pulmonary, gi and gu systems are negative, except as otherwise noted.      OBJECTIVE:   /64  Pulse 93  Temp 97.6  F (36.4  C) (Tympanic)  Ht 5' 4\" (1.626 m)  Wt 154 lb 6.4 oz (70 kg)  BMI 26.5 kg/m2  Body mass index is 26.5 kg/(m^2).  GENERAL APPEARANCE: healthy, alert and no distress  RESP: lungs clear to auscultation - no rales, rhonchi or wheezes  CV: regular rates and rhythm, normal S1 S2, no S3 or S4 and no murmur, click or rub    Diagnostic Test Results:  Results for orders placed or performed in visit on 11/06/17   **TSH with free T4 reflex FUTURE 6mo   Result Value Ref Range    TSH 9.09 (H) 0.40 - 4.00 mU/L   **A1C FUTURE anytime   Result Value Ref Range    Hemoglobin A1C 10.2 (H) 4.3 - 6.0 %   T4 free   Result Value Ref Range    T4 Free 0.91 0.76 - 1.46 ng/dL       ASSESSMENT/PLAN:   1. Type 1 diabetes mellitus with hyperglycemia (H) - uncontrolled. Patient with severe fear of hypoglycemia, zara due to her unawareness even at extreme lows.  As such, she has persistent hyperglycemia - often delaying taking insulin until after a meal, or avoiding it altogether.  Hopefully with Dexcom and the monitoring, she will gain the confidence to use insulin more consistently, and at higher doses that her body is clearly needs. Ideally she should increase the NovoLog and Tresiba, however she is not interested in that today.  She wants to monitor her blood sugars for another couple weeks with see GIM, and update the diabetes RN in 2-4 weeks.  - BD ULTRA FINE PEN NEEDLES; Inject 1 each Subcutaneous 6 times daily  Dispense: 1 Box; Refill: 11  - Hemoglobin A1c; Future    2. Hypoglycemia unawareness in type 1 diabetes mellitus (H) - see above    3. " Generalized anxiety disorder - stable, refill provided  - diazepam (VALIUM) 2 MG tablet; Take 1 tablet (2 mg) by mouth nightly as needed for anxiety or sleep  Dispense: 30 tablet; Refill: 5    4. Insomnia due to medical condition  - diazepam (VALIUM) 2 MG tablet; Take 1 tablet (2 mg) by mouth nightly as needed for anxiety or sleep  Dispense: 30 tablet; Refill: 5    5. Subclinical hypothyroidism - recheck in 6 months. In 2014 her thyroid antibody was negative which is reassuring, will check TPO at next visit.  - **TSH with free T4 reflex FUTURE 6mo; Future      Dinorah Lorenzo,   [facility]

## 2017-11-09 NOTE — PATIENT INSTRUCTIONS
1. Follow-up with Ryanne in 2-4 weeks.  Will plan to increase both insulins after given the CGM a few weeks to give you more readings and make real time adjustments.

## 2017-11-09 NOTE — TELEPHONE ENCOUNTER
Message from [employer]:  Original authorizing provider: Dinorah Lorenzo DO    Glory FRANKLINJimmy Vinnie would like a refill of the following medications:  diazepam (VALIUM) 2 MG tablet [Dinorah Lorenzo DO]    Preferred pharmacy: [facility] [address] [place] [address]    Comment:      Medication renewals requested in this message routed to other providers:  BD ULTRA FINE PEN NEEDLES [Gaby Glez, CNP]

## 2017-11-09 NOTE — TELEPHONE ENCOUNTER
Message from [employer]:  Original authorizing provider: BRAD Hurd would like a refill of the following medications:  BD ULTRA FINE PEN NEEDLES [Gaby Glez CNP]    Preferred pharmacy: [address]    Comment:      Medication renewals requested in this message routed to other providers:  diazepam (VALIUM) 2 MG tablet [Dinorah Lorenzo, DO]

## 2017-11-09 NOTE — TELEPHONE ENCOUNTER
Dr. Lorenzo,    I placed the 1/2 unit insulin in  for you to sign off on.  I will be getting a pen from the rep for her to use these cartridges with.  She will send me her data while she is in [place].  I am hoping to get her pen before she leaves next week.  Thanks!  Ryanne Everett RD, CDE

## 2017-11-14 ENCOUNTER — HOSPITAL ENCOUNTER (EMERGENCY)
Facility: CLINIC | Age: 52
Discharge: HOME | End: 2017-11-14
Attending: FAMILY MEDICINE | Admitting: FAMILY MEDICINE
Payer: MEDICARE

## 2017-11-14 ENCOUNTER — APPOINTMENT (OUTPATIENT)
Dept: CT IMAGING | Facility: CLINIC | Age: 52
End: 2017-11-14
Attending: FAMILY MEDICINE
Payer: MEDICARE

## 2017-11-14 VITALS
RESPIRATION RATE: 20 BRPM | TEMPERATURE: 97.9 F | OXYGEN SATURATION: 100 % | DIASTOLIC BLOOD PRESSURE: 70 MMHG | SYSTOLIC BLOOD PRESSURE: 122 MMHG

## 2017-11-14 DIAGNOSIS — N20.1 URETERAL STONE: ICD-10-CM

## 2017-11-14 LAB
ALBUMIN SERPL-MCNC: 3.7 G/DL (ref 3.4–5)
ALBUMIN UR-MCNC: NEGATIVE MG/DL
ALP SERPL-CCNC: 133 U/L (ref 40–150)
ALT SERPL W P-5'-P-CCNC: 29 U/L (ref 0–50)
ANION GAP SERPL CALCULATED.3IONS-SCNC: 8 MMOL/L (ref 3–14)
APPEARANCE UR: CLEAR
AST SERPL W P-5'-P-CCNC: 19 U/L (ref 0–45)
BASOPHILS # BLD AUTO: 0 10E9/L (ref 0–0.2)
BASOPHILS NFR BLD AUTO: 0.1 %
BILIRUB SERPL-MCNC: 0.4 MG/DL (ref 0.2–1.3)
BILIRUB UR QL STRIP: NEGATIVE
BUN SERPL-MCNC: 22 MG/DL (ref 7–30)
CALCIUM SERPL-MCNC: 8.6 MG/DL (ref 8.5–10.1)
CHLORIDE SERPL-SCNC: 104 MMOL/L (ref 94–109)
CO2 SERPL-SCNC: 27 MMOL/L (ref 20–32)
COLOR UR AUTO: YELLOW
CREAT SERPL-MCNC: 0.85 MG/DL (ref 0.52–1.04)
DIFFERENTIAL METHOD BLD: NORMAL
EOSINOPHIL # BLD AUTO: 0.1 10E9/L (ref 0–0.7)
EOSINOPHIL NFR BLD AUTO: 1.2 %
ERYTHROCYTE [DISTWIDTH] IN BLOOD BY AUTOMATED COUNT: 11.9 % (ref 10–15)
GFR SERPL CREATININE-BSD FRML MDRD: 70 ML/MIN/1.7M2
GLUCOSE SERPL-MCNC: 232 MG/DL (ref 70–99)
GLUCOSE UR STRIP-MCNC: >1000 MG/DL
HCT VFR BLD AUTO: 38.6 % (ref 35–47)
HGB BLD-MCNC: 13.6 G/DL (ref 11.7–15.7)
HGB UR QL STRIP: ABNORMAL
IMM GRANULOCYTES # BLD: 0 10E9/L (ref 0–0.4)
IMM GRANULOCYTES NFR BLD: 0.3 %
KETONES UR STRIP-MCNC: NEGATIVE MG/DL
LEUKOCYTE ESTERASE UR QL STRIP: NEGATIVE
LIPASE SERPL-CCNC: 125 U/L (ref 73–393)
LYMPHOCYTES # BLD AUTO: 1.8 10E9/L (ref 0.8–5.3)
LYMPHOCYTES NFR BLD AUTO: 23.2 %
MCH RBC QN AUTO: 31.2 PG (ref 26.5–33)
MCHC RBC AUTO-ENTMCNC: 35.2 G/DL (ref 31.5–36.5)
MCV RBC AUTO: 89 FL (ref 78–100)
MONOCYTES # BLD AUTO: 0.6 10E9/L (ref 0–1.3)
MONOCYTES NFR BLD AUTO: 8 %
MUCOUS THREADS #/AREA URNS LPF: PRESENT /LPF
NEUTROPHILS # BLD AUTO: 5.2 10E9/L (ref 1.6–8.3)
NEUTROPHILS NFR BLD AUTO: 67.2 %
NITRATE UR QL: NEGATIVE
PH UR STRIP: 6.5 PH (ref 5–7)
PLATELET # BLD AUTO: 277 10E9/L (ref 150–450)
POTASSIUM SERPL-SCNC: 4 MMOL/L (ref 3.4–5.3)
PROT SERPL-MCNC: 7.4 G/DL (ref 6.8–8.8)
RBC # BLD AUTO: 4.36 10E12/L (ref 3.8–5.2)
RBC #/AREA URNS AUTO: 14 /HPF (ref 0–2)
SODIUM SERPL-SCNC: 139 MMOL/L (ref 133–144)
SOURCE: ABNORMAL
SP GR UR STRIP: 1.02 (ref 1–1.03)
SQUAMOUS #/AREA URNS AUTO: 1 /HPF (ref 0–1)
UROBILINOGEN UR STRIP-MCNC: NORMAL MG/DL (ref 0–2)
WBC # BLD AUTO: 7.7 10E9/L (ref 4–11)
WBC #/AREA URNS AUTO: 1 /HPF (ref 0–2)

## 2017-11-14 PROCEDURE — 81001 URINALYSIS AUTO W/SCOPE: CPT | Performed by: FAMILY MEDICINE

## 2017-11-14 PROCEDURE — 83690 ASSAY OF LIPASE: CPT | Performed by: FAMILY MEDICINE

## 2017-11-14 PROCEDURE — 80053 COMPREHEN METABOLIC PANEL: CPT | Performed by: FAMILY MEDICINE

## 2017-11-14 PROCEDURE — 85025 COMPLETE CBC W/AUTO DIFF WBC: CPT | Performed by: FAMILY MEDICINE

## 2017-11-14 PROCEDURE — 99285 EMERGENCY DEPT VISIT HI MDM: CPT | Mod: 25 | Performed by: FAMILY MEDICINE

## 2017-11-14 PROCEDURE — 25000128 H RX IP 250 OP 636: Performed by: FAMILY MEDICINE

## 2017-11-14 PROCEDURE — 96361 HYDRATE IV INFUSION ADD-ON: CPT | Performed by: FAMILY MEDICINE

## 2017-11-14 PROCEDURE — 96374 THER/PROPH/DIAG INJ IV PUSH: CPT | Performed by: FAMILY MEDICINE

## 2017-11-14 PROCEDURE — 74176 CT ABD & PELVIS W/O CONTRAST: CPT

## 2017-11-14 PROCEDURE — 96375 TX/PRO/DX INJ NEW DRUG ADDON: CPT | Performed by: FAMILY MEDICINE

## 2017-11-14 PROCEDURE — 99285 EMERGENCY DEPT VISIT HI MDM: CPT | Mod: Z6 | Performed by: FAMILY MEDICINE

## 2017-11-14 RX ORDER — ONDANSETRON 4 MG/1
4-8 TABLET, ORALLY DISINTEGRATING ORAL EVERY 6 HOURS PRN
Qty: 10 TABLET | Refills: 0 | Status: SHIPPED | OUTPATIENT
Start: 2017-11-14 | End: 2018-05-17

## 2017-11-14 RX ORDER — OXYCODONE HYDROCHLORIDE 5 MG/1
10 TABLET ORAL EVERY 6 HOURS PRN
Qty: 30 TABLET | Refills: 0 | Status: SHIPPED | OUTPATIENT
Start: 2017-11-14 | End: 2018-05-17

## 2017-11-14 RX ORDER — KETOROLAC TROMETHAMINE 30 MG/ML
30 INJECTION, SOLUTION INTRAMUSCULAR; INTRAVENOUS ONCE
Status: COMPLETED | OUTPATIENT
Start: 2017-11-14 | End: 2017-11-14

## 2017-11-14 RX ORDER — ONDANSETRON 2 MG/ML
4 INJECTION INTRAMUSCULAR; INTRAVENOUS
Status: DISCONTINUED | OUTPATIENT
Start: 2017-11-14 | End: 2017-11-14 | Stop reason: HOSPADM

## 2017-11-14 RX ORDER — TAMSULOSIN HYDROCHLORIDE 0.4 MG/1
0.4 CAPSULE ORAL DAILY
Qty: 10 CAPSULE | Refills: 0 | Status: SHIPPED | OUTPATIENT
Start: 2017-11-14 | End: 2017-11-24

## 2017-11-14 RX ORDER — HYDROMORPHONE HYDROCHLORIDE 1 MG/ML
0.5 INJECTION, SOLUTION INTRAMUSCULAR; INTRAVENOUS; SUBCUTANEOUS
Status: DISCONTINUED | OUTPATIENT
Start: 2017-11-14 | End: 2017-11-14 | Stop reason: HOSPADM

## 2017-11-14 RX ADMIN — SODIUM CHLORIDE 1000 ML: 9 INJECTION, SOLUTION INTRAVENOUS at 06:53

## 2017-11-14 RX ADMIN — KETOROLAC TROMETHAMINE 30 MG: 30 INJECTION, SOLUTION INTRAMUSCULAR at 06:50

## 2017-11-14 RX ADMIN — ONDANSETRON 4 MG: 2 INJECTION INTRAMUSCULAR; INTRAVENOUS at 06:47

## 2017-11-14 RX ADMIN — HYDROMORPHONE HYDROCHLORIDE 0.5 MG: 1 INJECTION, SOLUTION INTRAMUSCULAR; INTRAVENOUS; SUBCUTANEOUS at 06:53

## 2017-11-14 NOTE — ED AVS SNAPSHOT
[facility] Emergency Department    [address] [address]    Phone:  [phone]    Fax:  [phone]                                       Glory Birmingham   MRN: [id number]    Department:  [facility] Emergency Department   Date of Visit:  11/14/2017           Patient Information     Date Of Birth          1965        Your diagnoses for this visit were:     Ureteral stone        You were seen by Navin Valdez MD.        Discharge Instructions       Return to the Emergency Room if the following occurs:     Worsened pain, fever, nausea with vomiting and dehydration, or for any concern at anytime.    Or, follow-up with the following provider as we discussed:     Return to urology if not improving over the next 5-7 days.  Call [phone] to schedule.    Medications discussed:    Ibuprofen 600mg every 6 hours for pain (7 days duration).  Tylenol 1000mg every 6 hours for pain (7 days duration).  Oxycodone 1-2 tabs every 6 hours, for pain not relieved by the above.  Zofran for nausea, as needed.  Flomax.    If you received pain-relieving or sedating medication during your time in the ER, avoid alcohol, driving automobiles, or working with machinery.  Also, a responsible adult must stay with you.      If you had X-rays or labs done we will attempt to contact you if there is a change needed in your care.      Call the Nurse Advice Line at [phone] or [phone] for any concern at anytime.        Future Appointments        Provider Department Dept Phone Center    11/14/2017 11:00 AM [facility] ROOM 1 [facility] [phone] [facility]      24 Hour Appointment Hotline       To make an appointment at any [facility], call [phone] ([phone]). If you don't have a family doctor or clinic, we will help you find one. [facility] are conveniently located to serve the needs of you and your family.             Review of your medicines       START taking        Dose / Directions Last dose taken    ondansetron 4 MG ODT tab   Commonly known as:  ZOFRAN ODT   Dose:  4-8 mg   Quantity:  10 tablet        Take 1-2 tablets (4-8 mg) by mouth every 6 hours as needed for nausea   Refills:  0        oxyCODONE IR 5 MG tablet   Commonly known as:  ROXICODONE   Dose:  10 mg   Quantity:  30 tablet        Take 2 tablets (10 mg) by mouth every 6 hours as needed for moderate to severe pain   Refills:  0        tamsulosin 0.4 MG capsule   Commonly known as:  FLOMAX   Dose:  0.4 mg   Quantity:  10 capsule        Take 1 capsule (0.4 mg) by mouth daily for 10 days   Refills:  0          Our records show that you are taking the medicines listed below. If these are incorrect, please call your family doctor or clinic.        Dose / Directions Last dose taken    amitriptyline 10 MG tablet   Commonly known as:  ELAVIL   Dose:  10 mg   Quantity:  90 tablet        Take 1 tablet (10 mg) by mouth At Bedtime   Refills:  3        aspirin 81 MG tablet        1 TABLET DAILY   Refills:  0        atorvastatin 10 MG tablet   Commonly known as:  LIPITOR   Quantity:  30 tablet        Take 5mg (one-half tablet) twice a week   Refills:  3        BD ULTRA FINE PEN NEEDLES   Dose:  1 each   Quantity:  1 Box        Inject 1 each Subcutaneous 6 times daily   Refills:  11        * blood glucose monitoring test strip   Commonly known as:  no brand specified   Quantity:  500 strip        Use as directed, up to 6 times a day. One Touch test strips   Refills:  2        * blood glucose monitoring test strip   Commonly known as:  ONETOUCH VERIO IQ   Quantity:  500 strip        Use to test blood sugar 6 times daily or as directed.   Refills:  3        cholecalciferol 1000 UNIT tablet   Commonly known as:  vitamin D3   Dose:  1000 Units   Quantity:  1 tablet        Take 1 tablet by mouth daily.   Refills:  0        CINNAMON PO   Dose:  500 mg        Take 500 mg by mouth daily   Refills:  0         CRANBERRY PO   Dose:  1 capsule        Take 1 capsule by mouth daily   Refills:  0        diazepam 2 MG tablet   Commonly known as:  VALIUM   Dose:  2 mg   Quantity:  30 tablet        Take 1 tablet (2 mg) by mouth nightly as needed for anxiety or sleep   Refills:  5        estradiol 0.5 MG tablet   Commonly known as:  ESTRACE   Quantity:  24 tablet        1 tab vaginallly twice a week   Refills:  3        glucagon 1 MG kit   Commonly known as:  GLUCAGON EMERGENCY   Dose:  1 mg   Quantity:  1 mg        Inject 1 mg Subcutaneous once for 1 dose   Refills:  1        * insulin aspart 100 UNIT/ML injection   Commonly known as:  NovoLOG PEN   Quantity:  15 mL        1 unit per 12 grams of carb at meals, sliding scale: 150-190 +1, 191-230 +2, 231-270 +3, 271-310 +4, 311-350 +5, 351-390 +6, 391-430 +7, 431-470 +8   Refills:  0        * insulin aspart 100 UNIT/ML injection   Commonly known as:  NOVOPEN ECHO   Quantity:  15 mL        Insulin to carb ratio 1 unit to 12 grams with food.  Sliding scale with meals: 150-190 +1, 191-230 +2, 231-270 +3, 271-310 +4, 311-350 +5, 351-390 +6, 391-430 +7, 431+ +8   Refills:  5        insulin degludec 100 UNIT/ML pen   Commonly known as:  TRESIBA   Dose:  15 Units   Quantity:  15 mL        Inject 15 Units Subcutaneous At Bedtime   Refills:  3        losartan 25 MG tablet   Commonly known as:  COZAAR   Dose:  12.5 mg   Quantity:  45 tablet        Take 0.5 tablets (12.5 mg) by mouth daily   Refills:  3        magnesium 250 MG tablet   Dose:  1 tablet        Take 1 tablet by mouth daily.   Refills:  0        methylphenidate 20 MG tablet   Commonly known as:  RITALIN   Dose:  10-20 mg   Quantity:  30 tablet        Take 0.5-1 tablets (10-20 mg) by mouth daily   Refills:  0        metoprolol 25 MG 24 hr tablet   Commonly known as:  TOPROL-XL   Dose:  25 mg   Quantity:  90 tablet        Take 1 tablet (25 mg) by mouth daily   Refills:  3        nitroGLYcerin 0.4 MG sublingual tablet   Commonly  known as:  NITROSTAT   Dose:  0.4 mg   Quantity:  25 tablet        Place 1 tablet (0.4 mg) under the tongue every 5 minutes as needed for chest pain   Refills:  3        OMEGA-3 FATTY ACIDS PO   Dose:  500 mg   Indication:  340-1,000 mg        Take 500 mg by mouth daily   Refills:  0        omeprazole 20 MG CR capsule   Commonly known as:  priLOSEC   Dose:  20 mg   Quantity:  90 capsule        Take 1 capsule (20 mg) by mouth daily   Refills:  3        Zinc 15 MG Caps   Dose:  1 capsule        Take 1 capsule by mouth daily   Refills:  0        * Notice:  This list has 4 medication(s) that are the same as other medications prescribed for you. Read the directions carefully, and ask your doctor or other care provider to review them with you.            Prescriptions were sent or printed at these locations (3 Prescriptions)                   Other Prescriptions                Printed at Department/Unit printer (3 of 3)         oxyCODONE IR (ROXICODONE) 5 MG tablet               ondansetron (ZOFRAN ODT) 4 MG ODT tab               tamsulosin (FLOMAX) 0.4 MG capsule                Procedures and tests performed during your visit     Abd/pelvis CT - no contrast - Stone Protocol    CBC with platelets differential    Comprehensive metabolic panel    Lipase    UA with Microscopic reflex to Culture      Orders Needing Specimen Collection     None      Pending Results     Date and Time Order Name Status Description    11/14/2017 0734 Abd/pelvis CT - no contrast - Stone Protocol Preliminary             Pending Culture Results     No orders found from 11/12/2017 to 11/15/2017.            Pending Results Instructions     If you had any lab results that were not finalized at the time of your Discharge, you can call the ED Lab Result RN at [phone]. You will be contacted by this team for any positive Lab results or changes in treatment. The nurses are available 7 days a week from 10A to 6:30P.  You can leave a message 24 hours per  day and they will return your call.        Test Results From Your Hospital Stay        11/14/2017  6:43 AM      Component Results     Component Value Ref Range & Units Status    WBC 7.7 4.0 - 11.0 10e9/L Final    RBC Count 4.36 3.8 - 5.2 10e12/L Final    Hemoglobin 13.6 11.7 - 15.7 g/dL Final    Hematocrit 38.6 35.0 - 47.0 % Final    MCV 89 78 - 100 fl Final    MCH 31.2 26.5 - 33.0 pg Final    MCHC 35.2 31.5 - 36.5 g/dL Final    RDW 11.9 10.0 - 15.0 % Final    Platelet Count 277 150 - 450 10e9/L Final    Diff Method Automated Method  Final    % Neutrophils 67.2 % Final    % Lymphocytes 23.2 % Final    % Monocytes 8.0 % Final    % Eosinophils 1.2 % Final    % Basophils 0.1 % Final    % Immature Granulocytes 0.3 % Final    Absolute Neutrophil 5.2 1.6 - 8.3 10e9/L Final    Absolute Lymphocytes 1.8 0.8 - 5.3 10e9/L Final    Absolute Monocytes 0.6 0.0 - 1.3 10e9/L Final    Absolute Eosinophils 0.1 0.0 - 0.7 10e9/L Final    Absolute Basophils 0.0 0.0 - 0.2 10e9/L Final    Abs Immature Granulocytes 0.0 0 - 0.4 10e9/L Final         11/14/2017  6:55 AM      Component Results     Component Value Ref Range & Units Status    Sodium 139 133 - 144 mmol/L Final    Potassium 4.0 3.4 - 5.3 mmol/L Final    Chloride 104 94 - 109 mmol/L Final    Carbon Dioxide 27 20 - 32 mmol/L Final    Anion Gap 8 3 - 14 mmol/L Final    Glucose 232 (H) 70 - 99 mg/dL Final    Urea Nitrogen 22 7 - 30 mg/dL Final    Creatinine 0.85 0.52 - 1.04 mg/dL Final    GFR Estimate 70 >60 mL/min/1.7m2 Final    Non  GFR Calc    GFR Estimate If Black 84 >60 mL/min/1.7m2 Final    African American GFR Calc    Calcium 8.6 8.5 - 10.1 mg/dL Final    Bilirubin Total 0.4 0.2 - 1.3 mg/dL Final    Albumin 3.7 3.4 - 5.0 g/dL Final    Protein Total 7.4 6.8 - 8.8 g/dL Final    Alkaline Phosphatase 133 40 - 150 U/L Final    ALT 29 0 - 50 U/L Final    AST 19 0 - 45 U/L Final         11/14/2017  7:20 AM      Component Results     Component Value Ref Range & Units  Status    Color Urine Yellow  Final    Appearance Urine Clear  Final    Glucose Urine >1000 (A) NEG^Negative mg/dL Final    Bilirubin Urine Negative NEG^Negative Final    Ketones Urine Negative NEG^Negative mg/dL Final    Specific Gravity Urine 1.017 1.003 - 1.035 Final    Blood Urine Trace (A) NEG^Negative Final    pH Urine 6.5 5.0 - 7.0 pH Final    Protein Albumin Urine Negative NEG^Negative mg/dL Final    Urobilinogen mg/dL Normal 0.0 - 2.0 mg/dL Final    Nitrite Urine Negative NEG^Negative Final    Leukocyte Esterase Urine Negative NEG^Negative Final    Source Midstream Urine  Final    WBC Urine 1 0 - 2 /HPF Final    RBC Urine 14 (H) 0 - 2 /HPF Final    Squamous Epithelial /HPF Urine 1 0 - 1 /HPF Final    Mucous Urine Present (A) NEG^Negative /LPF Final         11/14/2017  7:18 AM      Component Results     Component Value Ref Range & Units Status    Lipase 125 73 - 393 U/L Final         11/14/2017  8:20 AM      Narrative     CT ABDOMEN/PELVIS WITHOUT CONTRAST November 14, 2017 8:02 AM     HISTORY: Right flank pain, hematuria. No history of stone.    TECHNIQUE: CT of the abdomen and pelvis was performed without  intravenous contrast. Radiation dose for this scan was reduced using  automated exposure control, adjustment of the mA and/or kV according  to patient size, or iterative reconstruction technique.    COMPARISON: None.    FINDINGS: There is moderate right hydronephrosis. The distal right  ureter is difficult to adequately follow but there does appear to be a  probable stone measuring 4 mm in the distal right ureter near the  ureterovesicular junction.    Evaluation of the remaining solid organs is limited due to a lack of  intravenous contrast.        Impression     IMPRESSION: Mild to moderate right hydronephrosis and probable  obstructing stone in the distal right ureter.                Thank you for choosing [facility]       Thank you for choosing [facility] for your care. Our goal is always to provide you  with excellent care. Hearing back from our patients is one way we can continue to improve our services. Please take a few minutes to complete the written survey that you may receive in the mail after you visit with us. Thank you!        [employer] Information     [employer] gives you secure access to your electronic health record. If you see a primary care provider, you can also send messages to your care team and make appointments. If you have questions, please call your primary care clinic.  If you do not have a primary care provider, please call [phone] and they will assist you.        Care EveryWhere ID     This is your Care EveryWhere ID. This could be used by other organizations to access your [facility] medical records  [id number]        Equal Access to Services     RIP MARTIN : Michi Silva, niels sher, romi espinoza, ana paula oshea. So [facility] [phone].    ATENCIÓN: Si habla español, tiene a garcia disposición servicios gratuitos de asistencia lingüística. Llame al [phone].    We comply with applicable federal civil rights laws and [place] laws. We do not discriminate on the basis of race, color, national origin, age, disability, sex, sexual orientation, or gender identity.            After Visit Summary       This is your record. Keep this with you and show to your community pharmacist(s) and doctor(s) at your next visit.

## 2017-11-14 NOTE — ED AVS SNAPSHOT
[facility] Emergency Department    [address] [facility] [facility] [address]    Phone:  [phone]    Fax:  [phone]                                       Glory Birmingham   MRN: [id number]    Department:  [facility] Emergency Department   Date of Visit:  11/14/2017           After Visit Summary Signature Page     I have received my discharge instructions, and my questions have been answered. I have discussed any challenges I see with this plan with the nurse or doctor.    ..........................................................................................................................................  Patient/Patient Representative Signature      ..........................................................................................................................................  Patient Representative Print Name and Relationship to Patient    ..................................................               ................................................  Date                                            Time    ..........................................................................................................................................  Reviewed by Signature/Title    ...................................................              ..............................................  Date                                                            Time

## 2017-11-14 NOTE — ED PROVIDER NOTES
HPI  Patient is a 52-year-old female presenting with right lower quadrant abdominal pain and flank pain.  She has a known history of diabetes that is insulin-dependent.  She has had recurrent urinary tract infections.  No personal or family history of ureteral stones.  She has fibromyalgia.  She takes losartan and metoprolol as well.  She does not smoke.  No drugs.  No alcohol.    The patient has had right flank pain since about 2:00 AM this morning.  It awoke her from sleep.  The pain is been constant since onset.  The pain waxes and wanes.  It is severe.  She feels it in her right back and it radiates toward her right groin and into the pelvis.  At the onset of pain she had the feeling that she had to urinate.  There is been no dysuria or obvious hematuria.  No recent urgency or frequency.  No vaginal discharge or bleeding.  No changes in bowels.  No trauma or injury.  No fever.  No skin rash.    ROS: All other review of systems are negative other than that noted above.   PMH: Reviewed.  SH: Reviewed.  FH: Reviewed.      PHYSICAL  /77  Temp 97.9  F (36.6  C) (Oral)  Resp 20  SpO2 100%  General: Patient is alert and in severe distress.  Tearful.  Fit.  Neurological: Alert.  Moving upper and lower extremities equally, bilaterally.  Head / Neck: Atraumatic.  Ears: Not done.  Eyes: Pupils are equal, round, and reactive.  Normal conjunctiva.  Nose: Midline.  No epistaxis.  Mouth / Throat: No ulcerations or lesions.  Upper pharynx is not erythematous.  Moist.  Respiratory: No respiratory distress. CTA B.  Cardiovascular: Regular rhythm.  Peripheral extremities are warm.  No edema.  No calf tenderness.  Abdomen / Pelvis: Not tender.  No distention.  Soft throughout.  Genitalia: Not done.  Musculoskeletal: No tenderness over major muscles and joints.  Skin: No evidence of rash or trauma.        PHYSICIAN  0633.  Patient presents with flank pain.  She is not nauseous or vomiting.  She is not tender.  She likely  has ureteral stone.  Urine analysis and reflex culture ordered.  CBC, comprehensive panel, lipase.  Imaging will follow results.  Toradol, Dilaudid, Zofran, fluid ordered.    Labs Ordered and Resulted from Time of ED Arrival Up to the Time of Departure from the ED   COMPREHENSIVE METABOLIC PANEL - Abnormal; Notable for the following:        Result Value    Glucose 232 (*)     All other components within normal limits   ROUTINE UA WITH MICROSCOPIC REFLEX TO CULTURE - Abnormal; Notable for the following:     Glucose Urine >1000 (*)     Blood Urine Trace (*)     RBC Urine 14 (*)     Mucous Urine Present (*)     All other components within normal limits   CBC WITH PLATELETS DIFFERENTIAL   LIPASE       IMAGING  Images reviewed by me.  Radiology report also reviewed.  Abd/pelvis CT - no contrast - Stone Protocol   Preliminary Result   IMPRESSION: Mild to moderate right hydronephrosis and probable   obstructing stone in the distal right ureter.        0858.  CT imaging shows a distal right ureteral stone with hydronephrosis.  The patient's pain is controlled well at this point.  A prescription for Dilaudid, Flomax, and Zofran is given.  Follow up information is also provided.      IMPRESSION    ICD-10-CM    1. Ureteral stone N20.1            Critical Care time:  none                      Navin Valdez MD  11/14/17 0840

## 2017-11-14 NOTE — DISCHARGE INSTRUCTIONS
Return to the Emergency Room if the following occurs:     Worsened pain, fever, nausea with vomiting and dehydration, or for any concern at anytime.    Or, follow-up with the following provider as we discussed:     Return to urology if not improving over the next 5-7 days.  Call [phone] to schedule.    Medications discussed:    Ibuprofen 600mg every 6 hours for pain (7 days duration).  Tylenol 1000mg every 6 hours for pain (7 days duration).  Oxycodone 1-2 tabs every 6 hours, for pain not relieved by the above.  Zofran for nausea, as needed.  Flomax.    If you received pain-relieving or sedating medication during your time in the ER, avoid alcohol, driving automobiles, or working with machinery.  Also, a responsible adult must stay with you.      If you had X-rays or labs done we will attempt to contact you if there is a change needed in your care.      Call the Nurse Advice Line at [phone] or [phone] for any concern at anytime.

## 2017-11-15 ENCOUNTER — TELEPHONE (OUTPATIENT)
Dept: FAMILY MEDICINE | Facility: CLINIC | Age: 52
End: 2017-11-15

## 2017-11-15 NOTE — TELEPHONE ENCOUNTER
Patient notified that UC is not pending for her urine, no culture being done  Patient advised to use heat, increase fluid intake, and use Oxycodone Rx or OTC Tylenol or Ibuprofen for pain control  Patient advised if fever, abdominal pain, flank pain, or difficulty urinating presents, go to ED/UC right away  Patient verbalized understanding and agreed with this plan    Nakia TRIPATHI Rn

## 2017-11-15 NOTE — TELEPHONE ENCOUNTER
Reason for Call:  UTI and  stone    Detailed comments: patient is calling stating that she was seen ing the ED last night for a Kidney stone. She was given medications for pain, but nothing for for a UTI, and she is wondering if they are culturing her urine. She is having a burning feeling, when she pees and when she does not pee, all the time. She does have pain along with this. She did get a pain medication, but is wondering how she is going to tolerate that strong Rx. She was given a strainer etc. To catch the stone. She is very uncomfortable.    Phone Number Patient can be reached at: Home number on file [phone] (home)    Best Time: any      Can we leave a detailed message on this number? YES   Madeline De  [facility]      Call taken on 11/15/2017 at 11:18 AM by Madeline De

## 2017-11-16 ENCOUNTER — OFFICE VISIT (OUTPATIENT)
Dept: UROLOGY | Facility: CLINIC | Age: 52
End: 2017-11-16
Payer: MEDICARE

## 2017-11-16 VITALS — SYSTOLIC BLOOD PRESSURE: 133 MMHG | HEART RATE: 94 BPM | DIASTOLIC BLOOD PRESSURE: 77 MMHG | RESPIRATION RATE: 16 BRPM

## 2017-11-16 DIAGNOSIS — N20.1 CALCULUS OF URETER: Primary | ICD-10-CM

## 2017-11-16 PROCEDURE — 99214 OFFICE O/P EST MOD 30 MIN: CPT | Performed by: UROLOGY

## 2017-11-16 RX ORDER — CEFAZOLIN SODIUM 1 G/3ML
1 INJECTION, POWDER, FOR SOLUTION INTRAMUSCULAR; INTRAVENOUS SEE ADMIN INSTRUCTIONS
Status: CANCELLED | OUTPATIENT
Start: 2017-11-16

## 2017-11-16 NOTE — MR AVS SNAPSHOT
After Visit Summary   11/16/2017    Glory Birmingham    MRN: [id number]           Patient Information     Date Of Birth          1965        Visit Information        Provider Department      11/16/2017 11:30 AM Eliazar Lipscomb MD [facility]        Today's Diagnoses     Calculus of ureter    -  1       Follow-ups after your visit        Who to contact     If you have questions or need follow up information about today's clinic visit or your schedule please contact [facility] directly at [phone].  Normal or non-critical lab and imaging results will be communicated to you by [employer], letter or phone within 4 business days after the clinic has received the results. If you do not hear from us within 7 days, please contact the clinic through [employer] or phone. If you have a critical or abnormal lab result, we will notify you by phone as soon as possible.  Submit refill requests through [employer] or call your pharmacy and they will forward the refill request to us. Please allow 3 business days for your refill to be completed.          Additional Information About Your Visit        MyChart Information     [employer] gives you secure access to your electronic health record. If you see a primary care provider, you can also send messages to your care team and make appointments. If you have questions, please call your primary care clinic.  If you do not have a primary care provider, please call [phone] and they will assist you.        Care EveryWhere ID     This is your Care EveryWhere ID. This could be used by other organizations to access your [place] medical records  [id number]        Your Vitals Were     Pulse Respirations                94 16           Blood Pressure from Last 3 Encounters:   11/16/17 133/77   11/14/17 122/70   11/09/17 107/64    Weight from Last 3 Encounters:   11/09/17 70 kg (154 lb 6.4 oz)   08/15/17 70.7 kg (155 lb 12.8 oz)   07/21/17 69.9  kg (154 lb)              Today, you had the following     No orders found for display       Primary Care Provider Office Phone # Fax #    Dinorah Lorenzo [phone] [address] [facility] [address]        Equal Access to Services     RIP MARTIN : Hadii aad ku hadasho Soomaali, waaxda luqadaha, qaybta kaalmada adeegyada, waxay marcelinain hayaan adejordana jerardogrant oshea. So [facility] [phone].    ATENCIÓN: Si habla español, tiene a garcia disposición servicios gratuitos de asistencia lingüística. Llame al [phone].    We comply with applicable federal civil rights laws and [place] laws. We do not discriminate on the basis of race, color, national origin, age, disability, sex, sexual orientation, or gender identity.            Thank you!     Thank you for choosing [facility]  for your care. Our goal is always to provide you with excellent care. Hearing back from our patients is one way we can continue to improve our services. Please take a few minutes to complete the written survey that you may receive in the mail after your visit with us. Thank you!             Your Updated Medication List - Protect others around you: Learn how to safely use, store and throw away your medicines at www.disposemymeds.org.          This list is accurate as of: 11/16/17  2:02 PM.  Always use your most recent med list.                   Brand Name Dispense Instructions for use Diagnosis    amitriptyline 10 MG tablet    ELAVIL    90 tablet    Take 1 tablet (10 mg) by mouth At Bedtime    Bladder pain       aspirin 81 MG tablet      1 TABLET DAILY        atorvastatin 10 MG tablet    LIPITOR    30 tablet    Take 5mg (one-half tablet) twice a week    Coronary artery disease due to lipid rich plaque       BD ULTRA FINE PEN NEEDLES     1 Box    Inject 1 each Subcutaneous 6 times daily    Type 1 diabetes mellitus with hyperglycemia (H)       * blood glucose monitoring test strip    no brand specified     500 strip    Use as directed, up to 6 times a day. One Touch test strips    Type I (juvenile type) diabetes mellitus with other specified manifestations, not stated as uncontrolled       * blood glucose monitoring test strip    ONETOUCH VERIO IQ    500 strip    Use to test blood sugar 6 times daily or as directed.    Type 1 diabetes mellitus with hyperglycemia (H)       cholecalciferol 1000 UNIT tablet    vitamin D3    1 tablet    Take 1 tablet by mouth daily.        CINNAMON PO      Take 500 mg by mouth daily        CRANBERRY PO      Take 1 capsule by mouth daily        diazepam 2 MG tablet    VALIUM    30 tablet    Take 1 tablet (2 mg) by mouth nightly as needed for anxiety or sleep    Generalized anxiety disorder, Insomnia due to medical condition       estradiol 0.5 MG tablet    ESTRACE    24 tablet    1 tab vaginallly twice a week    Atrophic vaginitis       glucagon 1 MG kit    GLUCAGON EMERGENCY    1 mg    Inject 1 mg Subcutaneous once for 1 dose    Type 1 diabetes mellitus with hyperglycemia (H)       * insulin aspart 100 UNIT/ML injection    NovoLOG PEN    15 mL    1 unit per 12 grams of carb at meals, sliding scale: 150-190 +1, 191-230 +2, 231-270 +3, 271-310 +4, 311-350 +5, 351-390 +6, 391-430 +7, 431-470 +8    Type 1 diabetes mellitus with microalbuminuria (H)       * insulin aspart 100 UNIT/ML injection    NOVOPEN ECHO    15 mL    Insulin to carb ratio 1 unit to 12 grams with food.  Sliding scale with meals: 150-190 +1, 191-230 +2, 231-270 +3, 271-310 +4, 311-350 +5, 351-390 +6, 391-430 +7, 431+ +8    Type 1 diabetes mellitus with microalbuminuria (H)       insulin degludec 100 UNIT/ML pen    TRESIBA    15 mL    Inject 15 Units Subcutaneous At Bedtime    Type 1 diabetes mellitus with microalbuminuria (H)       losartan 25 MG tablet    COZAAR    45 tablet    Take 0.5 tablets (12.5 mg) by mouth daily    Type 1 diabetes mellitus with microalbuminuria (H)       magnesium 250 MG tablet      Take 1 tablet by  mouth daily.        methylphenidate 20 MG tablet    RITALIN    30 tablet    Take 0.5-1 tablets (10-20 mg) by mouth daily    Hypersomnia       metoprolol 25 MG 24 hr tablet    TOPROL-XL    90 tablet    Take 1 tablet (25 mg) by mouth daily    Benign essential hypertension       nitroGLYcerin 0.4 MG sublingual tablet    NITROSTAT    25 tablet    Place 1 tablet (0.4 mg) under the tongue every 5 minutes as needed for chest pain    CAD (coronary artery disease)       OMEGA-3 FATTY ACIDS PO      Take 500 mg by mouth daily        omeprazole 20 MG CR capsule    priLOSEC    90 capsule    Take 1 capsule (20 mg) by mouth daily    Gastroesophageal reflux disease without esophagitis       ondansetron 4 MG ODT tab    ZOFRAN ODT    10 tablet    Take 1-2 tablets (4-8 mg) by mouth every 6 hours as needed for nausea        oxyCODONE IR 5 MG tablet    ROXICODONE    30 tablet    Take 2 tablets (10 mg) by mouth every 6 hours as needed for moderate to severe pain        tamsulosin 0.4 MG capsule    FLOMAX    10 capsule    Take 1 capsule (0.4 mg) by mouth daily for 10 days        Zinc 15 MG Caps      Take 1 capsule by mouth daily        * Notice:  This list has 4 medication(s) that are the same as other medications prescribed for you. Read the directions carefully, and ask your doctor or other care provider to review them with you.

## 2017-11-16 NOTE — PROGRESS NOTES
REASON FOR VISIT TODAY:  Ureteral stone.      HISTORY:  Ms. Glory Birmingham is a 52-year-old woman followed in our clinic in the distant past for recurrent urinary tract infections.  The patient had acute onset of right-sided flank and abdominal and groin pain 2 nights ago and was evaluated and ultimately found to have a 4 mm x 5 mm distal ureteral stone on the right.  The patient elected conservative management and notes that she is simply using ibuprofen for pain control currently.  She notes that the intense pain that she had the first night is gone, but she does continue to feel pressure in her bladder area.  She is taking Flomax and she continues to strain her urine, but has not seen the stone.      PAST MEDICAL HISTORY:  Otherwise significant for diabetes for the last 41 years, some neuralgias, cervicalgia, diabetic retinopathy, diabetic gastroparesis, cataracts, hypothyroidism, general anxiety disorder, fibromyalgia, depression, coronary artery disease, hypertension and GERD.      PAST SURGICAL HISTORY:  Includes C-sections and a bilateral mastectomy with breast implants.      CURRENT MEDICATIONS:  Tamsulosin, valium, insulin, Estradiol, metoprolol, amitriptyline, losartan, atorvastatin, omeprazole, methylphenidate, Degludec insulin aspart, cinnamon, cranberry, Omega 3 fatty acids, nitroglycerin sublingual, magnesium, zinc, vitamin D, aspirin, oxycodone, Zofran.      ALLERGIES:  Reductase inhibitors, lisinopril, prednisone and pregabalin.      FAMILY HISTORY:  Noncontributory.      SOCIAL HISTORY:  Noncontributory.      REVIEW OF SYSTEMS:  Negative for fevers, chills, sweats, nausea, vomiting, unexplained weight changes.      PHYSICAL EXAMINATION:   VITAL:  On exam, her blood pressure is 133/77, pulse is 94.   GENERAL:  She is in no acute distress.      DIAGNOSTICS:  She has a urinalysis from 11/14/2017 showing greater than 1000 mg per deciliter of glucose, 14 red blood cells, only 1 white blood cell per  high-power field.  Serum glucose was measured at 232.  A CT scan from 11/14/2017 again demonstrating the distal right ureteral stone near the UVJ.      ASSESSMENT AND PLAN:  Over half of today's 30-minute visit was spent counseling the patient regarding her right ureteral stone.  I suggested to Ms. Birmingham that she does have a very distal stone and based on the fact that she continues to have some symptoms, she likely still has the stone, although it may have moved yet even further in the distal ureter based on her symptom pattern.  Of note, the patient states that she and her  were planning to leave for [place] yesterday but with the patient not passing the stone they have not left yet.  However, their house and horse are all packed and ready to leave and the patient is inquiring about options for managing the stone.  We discussed that she has no absolute indications for intervention at this time given the fact that her pain is well controlled with ibuprofen and she is not having fevers and in fact feels well at this time.  However, we did suggest to the patient that she should not travel while in the process of passing the stone, especially given the fact that they [place] there may not be ready access to medical care at all and certainly not necessarily to a urologist that would be required if the patient were to become septic on the way from this stone.  We therefore suggested to the patient that she can either continue to observe things locally and continue with the Flomax and fluids or we could consider ureteroscopy to try to remove the stone.  After discussion of risks and benefits, the patient wishes to move forward with ureteroscopy to remove the stone.  We will look into finding OR time hopefully tomorrow be able to get the stone out of the patient so she can get traveling.         SHERRY MART MD             D: 11/16/2017 14:02   T:  2017 17:55   MT: [id number]      Name:     LUPILLO PETERS   MRN:      [id number]        Account:      [id number]   :      1965           Visit Date:   2017      Document: [id number]

## 2017-11-16 NOTE — NURSING NOTE
"Chief Complaint   Patient presents with     Consult       Initial /77 (BP Location: Left arm, Patient Position: Chair, Cuff Size: Adult Regular)  Pulse 94  Resp 16 Estimated body mass index is 26.5 kg/(m^2) as calculated from the following:    Height as of 11/9/17: 1.626 m (5' 4\").    Weight as of 11/9/17: 70 kg (154 lb 6.4 oz).  Medication Reconciliation: complete.  uriel aguilar LPN      "

## 2017-11-17 ENCOUNTER — ALLIED HEALTH/NURSE VISIT (OUTPATIENT)
Dept: EDUCATION SERVICES | Facility: CLINIC | Age: 52
End: 2017-11-17
Payer: MEDICARE

## 2017-11-17 DIAGNOSIS — E10.29 TYPE 1 DIABETES MELLITUS WITH MICROALBUMINURIA (H): Chronic | ICD-10-CM

## 2017-11-17 DIAGNOSIS — R80.9 TYPE 1 DIABETES MELLITUS WITH MICROALBUMINURIA (H): Chronic | ICD-10-CM

## 2017-11-17 PROCEDURE — G0108 DIAB MANAGE TRN  PER INDIV: HCPCS | Performed by: DIETITIAN, REGISTERED

## 2017-11-17 NOTE — MR AVS SNAPSHOT
After Visit Summary   11/17/2017    Glory FRANKLIN [facility]    MRN: [id number]           Patient Information     Date Of Birth          1965        Visit Information        Provider Department      11/17/2017 11:00 AM Ryanne Everett RD [facility]        Today's Diagnoses     Type 1 diabetes mellitus with microalbuminuria (H)          Care Instructions    I will mail the pen to [place] when I get the novolog echo pen for you.      Focus on the arrows on the dexcom and give more insulin or take away accordingly          Follow-ups after your visit        Future tests that were ordered for you today     Open Future Orders        Priority Expected Expires Ordered    CT Pelvis w/o Contrast Routine  11/16/2018 11/16/2017            Who to contact     If you have questions or need follow up information about today's clinic visit or your schedule please contact [facility] directly at [phone].  Normal or non-critical lab and imaging results will be communicated to you by [employer], letter or phone within 4 business days after the clinic has received the results. If you do not hear from us within 7 days, please contact the clinic through [employer] or phone. If you have a critical or abnormal lab result, we will notify you by phone as soon as possible.  Submit refill requests through Sequence or call your pharmacy and they will forward the refill request to us. Please allow 3 business days for your refill to be completed.          Additional Information About Your Visit        MyChart Information     Sequence gives you secure access to your electronic health record. If you see a primary care provider, you can also send messages to your care team and make appointments. If you have questions, please call your primary care clinic.  If you do not have a primary care provider, please call [phone] and they will assist you.        Care EveryWhere ID     This is your Care EveryWhere ID. This  could be used by other organizations to access your [place] medical records  [id number]         Blood Pressure from Last 3 Encounters:   11/16/17 133/77   11/14/17 122/70   11/09/17 107/64    Weight from Last 3 Encounters:   11/09/17 70 kg (154 lb 6.4 oz)   08/15/17 70.7 kg (155 lb 12.8 oz)   07/21/17 69.9 kg (154 lb)              We Performed the Following     DIAB MANAGE TRN  PER INDIV          Today's Medication Changes          These changes are accurate as of: 11/17/17  3:57 PM.  If you have any questions, ask your nurse or doctor.               These medicines have changed or have updated prescriptions.        Dose/Directions    * insulin aspart 100 UNIT/ML injection   Commonly known as:  NovoLOG PEN   This may have changed:  Another medication with the same name was added. Make sure you understand how and when to take each.   Used for:  Type 1 diabetes mellitus with microalbuminuria (H)   Changed by:  Dinorah Lorenzo DO        1 unit per 12 grams of carb at meals, sliding scale: 150-190 +1, 191-230 +2, 231-270 +3, 271-310 +4, 311-350 +5, 351-390 +6, 391-430 +7, 431-470 +8   Quantity:  15 mL   Refills:  0       * insulin aspart 100 UNIT/ML injection   Commonly known as:  NOVOPEN ECHO   This may have changed:  You were already taking a medication with the same name, and this prescription was added. Make sure you understand how and when to take each.   Used for:  Type 1 diabetes mellitus with microalbuminuria (H)   Changed by:  Ryanne Everett RD        Insulin to carb ratio 1 unit to 12 grams with food.  Sliding scale with meals   Quantity:  15 mL   Refills:  5       * Notice:  This list has 2 medication(s) that are the same as other medications prescribed for you. Read the directions carefully, and ask your doctor or other care provider to review them with you.         Where to get your medicines      These medications were sent to [facility] [address] [place] [address] [address] [address] [address]  [facility] [address]     Phone:  [phone]     insulin aspart 100 UNIT/ML injection                Primary Care Provider Office Phone # Fax #    Dinorah Lorenzo [phone] [address] [facility] [address]        Equal Access to Services     RIP MARTIN : Hadii aad ku hadasho Soomaali, waaxda luqadaha, qaybta kaalmada adeegyada, waxay marcelinain hayaan adejordana younglaceygrant oshea. So [facility] [phone].    ATENCIÓN: Si habla español, tiene a garcia disposición servicios gratuitos de asistencia lingüística. Llame al [phone].    We comply with applicable federal civil rights laws and [place] laws. We do not discriminate on the basis of race, color, national origin, age, disability, sex, sexual orientation, or gender identity.            Thank you!     Thank you for choosing [facility]  for your care. Our goal is always to provide you with excellent care. Hearing back from our patients is one way we can continue to improve our services. Please take a few minutes to complete the written survey that you may receive in the mail after your visit with us. Thank you!             Your Updated Medication List - Protect others around you: Learn how to safely use, store and throw away your medicines at www.disposemymeds.org.          This list is accurate as of: 11/17/17  3:57 PM.  Always use your most recent med list.                   Brand Name Dispense Instructions for use Diagnosis    amitriptyline 10 MG tablet    ELAVIL    90 tablet    Take 1 tablet (10 mg) by mouth At Bedtime    Bladder pain       aspirin 81 MG tablet      1 TABLET DAILY        atorvastatin 10 MG tablet    LIPITOR    30 tablet    Take 5mg (one-half tablet) twice a week    Coronary artery disease due to lipid rich plaque       BD ULTRA FINE PEN NEEDLES     1 Box    Inject 1 each Subcutaneous 6 times daily    Type 1 diabetes mellitus with hyperglycemia (H)       * blood glucose monitoring test strip    no  brand specified    500 strip    Use as directed, up to 6 times a day. One Touch test strips    Type I (juvenile type) diabetes mellitus with other specified manifestations, not stated as uncontrolled       * blood glucose monitoring test strip    ONETOUCH VERIO IQ    500 strip    Use to test blood sugar 6 times daily or as directed.    Type 1 diabetes mellitus with hyperglycemia (H)       cholecalciferol 1000 UNIT tablet    vitamin D3    1 tablet    Take 1 tablet by mouth daily.        CINNAMON PO      Take 500 mg by mouth daily        CRANBERRY PO      Take 1 capsule by mouth daily        diazepam 2 MG tablet    VALIUM    30 tablet    Take 1 tablet (2 mg) by mouth nightly as needed for anxiety or sleep    Generalized anxiety disorder, Insomnia due to medical condition       estradiol 0.5 MG tablet    ESTRACE    24 tablet    1 tab vaginallly twice a week    Atrophic vaginitis       glucagon 1 MG kit    GLUCAGON EMERGENCY    1 mg    Inject 1 mg Subcutaneous once for 1 dose    Type 1 diabetes mellitus with hyperglycemia (H)       * insulin aspart 100 UNIT/ML injection    NovoLOG PEN    15 mL    1 unit per 12 grams of carb at meals, sliding scale: 150-190 +1, 191-230 +2, 231-270 +3, 271-310 +4, 311-350 +5, 351-390 +6, 391-430 +7, 431-470 +8    Type 1 diabetes mellitus with microalbuminuria (H)       * insulin aspart 100 UNIT/ML injection    NOVOPEN ECHO    15 mL    Insulin to carb ratio 1 unit to 12 grams with food.  Sliding scale with meals    Type 1 diabetes mellitus with microalbuminuria (H)       insulin degludec 100 UNIT/ML pen    TRESIBA    15 mL    Inject 15 Units Subcutaneous At Bedtime    Type 1 diabetes mellitus with microalbuminuria (H)       losartan 25 MG tablet    COZAAR    45 tablet    Take 0.5 tablets (12.5 mg) by mouth daily    Type 1 diabetes mellitus with microalbuminuria (H)       magnesium 250 MG tablet      Take 1 tablet by mouth daily.        methylphenidate 20 MG tablet    RITALIN    30 tablet     Take 0.5-1 tablets (10-20 mg) by mouth daily    Hypersomnia       metoprolol 25 MG 24 hr tablet    TOPROL-XL    90 tablet    Take 1 tablet (25 mg) by mouth daily    Benign essential hypertension       nitroGLYcerin 0.4 MG sublingual tablet    NITROSTAT    25 tablet    Place 1 tablet (0.4 mg) under the tongue every 5 minutes as needed for chest pain    CAD (coronary artery disease)       OMEGA-3 FATTY ACIDS PO      Take 500 mg by mouth daily        omeprazole 20 MG CR capsule    priLOSEC    90 capsule    Take 1 capsule (20 mg) by mouth daily    Gastroesophageal reflux disease without esophagitis       ondansetron 4 MG ODT tab    ZOFRAN ODT    10 tablet    Take 1-2 tablets (4-8 mg) by mouth every 6 hours as needed for nausea        oxyCODONE IR 5 MG tablet    ROXICODONE    30 tablet    Take 2 tablets (10 mg) by mouth every 6 hours as needed for moderate to severe pain        tamsulosin 0.4 MG capsule    FLOMAX    10 capsule    Take 1 capsule (0.4 mg) by mouth daily for 10 days        Zinc 15 MG Caps      Take 1 capsule by mouth daily        * Notice:  This list has 4 medication(s) that are the same as other medications prescribed for you. Read the directions carefully, and ask your doctor or other care provider to review them with you.

## 2017-11-17 NOTE — PATIENT INSTRUCTIONS
I will mail the pen to [place] when I get the novolog echo pen for you.      Focus on the arrows on the dexcom and give more insulin or take away accordingly

## 2017-11-17 NOTE — PROGRESS NOTES
Diabetes Self Management Training: education for the echo 1/2 unit pen    Glory Birmingham presents today for education, download of continuous glucose monitoring and echo pen education related to Type 1 diabetes.    She is accompanied by self    Patient's diabetes management related comments/concerns: not a lot of data due to switching out the , the audio was not working on it    Patient's emotional response to diabetes: expresses readiness to learn    Patient would like this visit to be focused around the following diabetes-related behaviors and goals: Healthy Eating, Being Active, Monitoring and Taking Medication    ASSESSMENT:  Patient Problem List and Family Medical History reviewed for relevant medical history, current medical status, and diabetes risk factors.    Current Diabetes Management per Patient:  Taking diabetes medications?   yes:     Diabetes Medication(s)     Diabetic Other Sig    glucagon (GLUCAGON EMERGENCY) 1 MG injection Inject 1 mg Subcutaneous once for 1 dose    Insulin Sig    insulin aspart (NOVOPEN ECHO) 100 UNIT/ML injection Insulin to carb ratio 1 unit to 12 grams with food.  Sliding scale with meals    insulin aspart (NOVOPEN ECHO) 100 UNIT/ML injection Insulin to carb ratio 1 unit to 12 grams with food.  Sliding scale with meals: 150-190 +1, 191-230 +2, 231-270 +3, 271-310 +4, 311-350 +5, 351-390 +6, 391-430 +7, 431+ +8    insulin aspart (NOVOPEN ECHO) 100 UNIT/ML injection Insulin to carb ratio 1 unit to 12 grams with food.  Sliding scale with meals: 150-190 +1, 191-230 +2, 231-270 +3, 271-310 +4, 311-350 +5, 351-390 +6, 391-430 +7, 431+ +8    insulin degludec (TRESIBA) 100 UNIT/ML pen Inject 15 Units Subcutaneous At Bedtime    insulin aspart (NOVOLOG PEN) 100 UNIT/ML injection 1 unit per 12 grams of carb at meals, sliding scale: 150-190 +1, 191-230 +2, 231-270 +3, 271-310 +4, 311-350 +5, 351-390 +6, 391-430 +7, 431-470 +8          *Abbreviated insulin dose documentation key:  "Insulin Trade Name (yuwayfilv-ithbn-zutufj-bedtime) - i.e. Humalog 5-5-5-0 (Humalog 5 units at breakfast, 5 units at lunch, and 5 units at dinner).    Past Diabetes Education: Yes    Patient glucose self monitoring as follows: four times daily.     BG results: last three CGM when she was dealing with a kidney stone, passed it last night.  Plus nervous due to needing to have surgery if stone did not pass     BG values are: Not in goal  Patient's most recent   Lab Results   Component Value Date    A1C 10.2 11/06/2017    is not meeting goal of <8.0    Nutrition:  Does well with carb counting, no concerns there  Physical Activity:    Limitations: nerve damage  Does what she can, has been packing for [facility]    Diabetes Risk Factors:  family history and age over 45 years    Diabetes Complications:  Not discussed today.    Vitals:  There were no vitals taken for this visit.  Estimated body mass index is 26.5 kg/(m^2) as calculated from the following:    Height as of 11/9/17: 1.626 m (5' 4\").    Weight as of 11/9/17: 70 kg (154 lb 6.4 oz).   Last 3 BP:   BP Readings from Last 3 Encounters:   11/16/17 133/77   11/14/17 122/70   11/09/17 107/64       History   Smoking Status     Never Smoker   Smokeless Tobacco     Never Used       Labs:  Lab Results   Component Value Date    A1C 10.2 11/06/2017     Lab Results   Component Value Date     11/14/2017     Lab Results   Component Value Date    LDL 67 08/14/2017     HDL Cholesterol   Date Value Ref Range Status   08/14/2017 85 >49 mg/dL Final   ]  GFR Estimate   Date Value Ref Range Status   11/14/2017 70 >60 mL/min/1.7m2 Final     Comment:     Non  GFR Calc     GFR Estimate If Black   Date Value Ref Range Status   11/14/2017 84 >60 mL/min/1.7m2 Final     Comment:      GFR Calc     Lab Results   Component Value Date    CR 0.85 11/14/2017     No results found for: MICROALBUMIN    Socio/Economic Considerations:    Support system: " family    Health Beliefs and Attitudes:   Patient Activation Measure Survey Score:  EDUARDO Score (Last Two) 3/7/2011   EDUARDO Raw Score 46   Activation Score 75.3   EDUARDO Level 4       Stage of Change: ACTION (Actively working towards change)      Diabetes knowledge and skills assessment:     Patient is knowledgeable in diabetes management concepts related to: Healthy Eating, Being Active and Monitoring    Patient needs further education on the following diabetes management concepts: Healthy Eating, Being Active, Monitoring, Taking Medication, Problem Solving, Reducing Risks and Healthy Coping    Barriers to Learning Assessment: No Barriers identified    Based on learning assessment above, most appropriate setting for further diabetes education would be: Individual setting.    INTERVENTION:   Needs education on novolog echo pen for 1/2 unit dosing, done today with demo pen.  Waiting for pen from [facility] with mail to pt in [place]  Also discussed her issues she has been having with the dexcom sensor, has been on about 2 weeks  She will send me data when in [place], she will work on adjusting dose off of the arrows on dexcom    Education provided today on:  AADE Self-Care Behaviors:  Monitoring: dexcom and 1/2 unit pen use    Opportunities for ongoing education and support in diabetes-self management were discussed.    Pt verbalized understanding of concepts discussed and recommendations provided today.       Education Materials Provided:  No new materials provided today    PLAN:  See Patient Instructions for co-developed, patient-stated behavior change goals.  AVS printed and provided to patient today.    FOLLOW-UP:  Follow-up as needed.     Ongoing plan for education and support: Follow-up visit with diabetes educator via email while [facility]      Time Spent: 30 minutes  Encounter Type: Individual    Any diabetes medication dose changes were made via the CDE Protocol and Collaborative Practice Agreement with the patient's primary  care provider. A copy of this encounter was shared with the provider.

## 2017-12-17 ENCOUNTER — TELEPHONE (OUTPATIENT)
Dept: FAMILY MEDICINE | Facility: CLINIC | Age: 52
End: 2017-12-17

## 2017-12-18 NOTE — TELEPHONE ENCOUNTER
[place] Dept of Trans - diabetic driving form completed and routed to Dr. Lorenzo for review and signature.

## 2018-01-15 ENCOUNTER — TELEPHONE (OUTPATIENT)
Dept: FAMILY MEDICINE | Facility: CLINIC | Age: 53
End: 2018-01-15

## 2018-01-16 NOTE — TELEPHONE ENCOUNTER
[employer] Medicare diabetic audit chart note request received and printed chart notes from 11/9/17 office visit and were routed to Dr. Lorenzo for review and signature.

## 2018-02-05 ENCOUNTER — TELEPHONE (OUTPATIENT)
Dept: FAMILY MEDICINE | Facility: CLINIC | Age: 53
End: 2018-02-05

## 2018-02-05 NOTE — TELEPHONE ENCOUNTER
Panel Management Review      Patient has the following on her problem list:     Depression / Dysthymia review        PHQ-9 SCORE 11/12/2015 8/2/2016 8/15/2017   Total Score - - -   Total Score 11 8 2           Patient is due for:  DAP and PHQ9    Diabetes    ASA: Passed    Last A1C  Lab Results   Component Value Date    A1C 10.2 11/06/2017    A1C 9.3 08/14/2017    A1C 9.9 05/09/2017    A1C 9.9 07/07/2016    A1C 9.2 06/28/2016     A1C tested: FAILED    Last LDL:    Lab Results   Component Value Date    CHOL 163 08/14/2017     Lab Results   Component Value Date    HDL 85 08/14/2017     Lab Results   Component Value Date    LDL 67 08/14/2017     Lab Results   Component Value Date    TRIG 55 08/14/2017     Lab Results   Component Value Date    CHOLHDLRATIO 2.1 06/25/2015     Lab Results   Component Value Date    NHDL 78 08/14/2017       Is the patient on a Statin? YES             Is the patient on Aspirin? YES    Medications     HMG CoA Reductase Inhibitors    atorvastatin (LIPITOR) 10 MG tablet    Salicylates    ASPIRIN 81 MG OR TABS          Last three blood pressure readings:  BP Readings from Last 3 Encounters:   11/16/17 133/77   11/14/17 122/70   11/09/17 107/64       Date of last diabetes office visit: 11/09/2017     Tobacco History:     History   Smoking Status     Never Smoker   Smokeless Tobacco     Never Used           Composite cancer screening  Chart review shows that this patient is due/due soon for the following None  Summary:    Patient is due/failing the following:   Eye exam, Diabetes office visit, non fasting labs:  A1C, DAP and PHQ9    Action needed:   Patient needs office visit for Eye exam.  Pt needs office visit for Diabetes follow up.  Patient needs non-fasting lab only appointment  Pt also needs questionnaires PHQ9/DAP done    Type of outreach:    Sent letter.    Questions for provider review:    None                                                                                                                                     Arianna Matias CMA (HEIDI)   (aka: Talia Matias)       Chart routed to Care Team postponed and outreach pt later .

## 2018-02-05 NOTE — LETTER
February 5, 2018      Glory Birmingham  [address] [facility] [address]          Dear Glory Birmingham, [phone]      At [facility] we care about your health and are committed to providing quality patient care, which includes staying current on preventativescreenings.  You can increase your chances of finding and treating diseases through regular screenings.      Our records show that you are due for the following screening(s):      Non fasting labs (blood work needed)    Diabetes follow up as soon as possible    Eye exam      Our phone to schedule the appointments above is [phone]    If you have a My-Chart Account, you also can schedule this appointment through there.    If you have already had one or all of the above screening tests at another facility, please call us so that we may update your chart.      Your partners in health,      Quality Committee   [facility]

## 2018-02-12 NOTE — TELEPHONE ENCOUNTER
(2nd attempt)  Left a voice msg for pt to call back.  When pt calls back help schedule non fasting labs + Diabetes office visit follow up + Eye exam  Pt needs to perform PHQ9 so after scheduling the appointments above transfer for the CARE TEAM.  Pt also needs DAP done.  Postponed for a week.  Arianna Matias CMA (HEIDI)   (aka: Talia Matias)

## 2018-02-19 NOTE — TELEPHONE ENCOUNTER
[place] will schedule OV for May.    Marj CORDERO  Station

## 2018-05-14 DIAGNOSIS — E10.65 TYPE 1 DIABETES MELLITUS WITH HYPERGLYCEMIA (H): Chronic | ICD-10-CM

## 2018-05-14 DIAGNOSIS — E03.8 SUBCLINICAL HYPOTHYROIDISM: ICD-10-CM

## 2018-05-14 LAB
HBA1C MFR BLD: 6.9 % (ref 0–5.6)
T4 FREE SERPL-MCNC: 0.85 NG/DL (ref 0.76–1.46)
TSH SERPL DL<=0.005 MIU/L-ACNC: 12.72 MU/L (ref 0.4–4)

## 2018-05-14 PROCEDURE — 84443 ASSAY THYROID STIM HORMONE: CPT | Performed by: INTERNAL MEDICINE

## 2018-05-14 PROCEDURE — 86376 MICROSOMAL ANTIBODY EACH: CPT | Performed by: INTERNAL MEDICINE

## 2018-05-14 PROCEDURE — 83036 HEMOGLOBIN GLYCOSYLATED A1C: CPT | Performed by: INTERNAL MEDICINE

## 2018-05-14 PROCEDURE — 84439 ASSAY OF FREE THYROXINE: CPT | Performed by: INTERNAL MEDICINE

## 2018-05-14 PROCEDURE — 36415 COLL VENOUS BLD VENIPUNCTURE: CPT | Performed by: INTERNAL MEDICINE

## 2018-05-15 LAB — THYROPEROXIDASE AB SERPL-ACNC: 1681 IU/ML

## 2018-05-17 ENCOUNTER — OFFICE VISIT (OUTPATIENT)
Dept: FAMILY MEDICINE | Facility: CLINIC | Age: 53
End: 2018-05-17
Payer: MEDICARE

## 2018-05-17 VITALS
HEIGHT: 64 IN | BODY MASS INDEX: 26.63 KG/M2 | WEIGHT: 156 LBS | DIASTOLIC BLOOD PRESSURE: 65 MMHG | OXYGEN SATURATION: 100 % | HEART RATE: 89 BPM | TEMPERATURE: 98 F | SYSTOLIC BLOOD PRESSURE: 118 MMHG

## 2018-05-17 DIAGNOSIS — F32.0 MILD MAJOR DEPRESSION (H): ICD-10-CM

## 2018-05-17 DIAGNOSIS — G47.11 IDIOPATHIC HYPERSOMNIA WITH LONG SLEEP TIME: ICD-10-CM

## 2018-05-17 DIAGNOSIS — M79.7 FIBROMYALGIA: ICD-10-CM

## 2018-05-17 DIAGNOSIS — E03.8 SUBCLINICAL HYPOTHYROIDISM: Chronic | ICD-10-CM

## 2018-05-17 DIAGNOSIS — K31.84 DIABETIC GASTROPARESIS (H): Chronic | ICD-10-CM

## 2018-05-17 DIAGNOSIS — E11.43 DIABETIC GASTROPARESIS (H): Chronic | ICD-10-CM

## 2018-05-17 DIAGNOSIS — K21.9 GASTROESOPHAGEAL REFLUX DISEASE WITHOUT ESOPHAGITIS: ICD-10-CM

## 2018-05-17 DIAGNOSIS — R80.9 TYPE 1 DIABETES MELLITUS WITH MICROALBUMINURIA (H): Primary | Chronic | ICD-10-CM

## 2018-05-17 DIAGNOSIS — E10.29 TYPE 1 DIABETES MELLITUS WITH MICROALBUMINURIA (H): Primary | Chronic | ICD-10-CM

## 2018-05-17 PROCEDURE — 99214 OFFICE O/P EST MOD 30 MIN: CPT | Performed by: INTERNAL MEDICINE

## 2018-05-17 ASSESSMENT — ANXIETY QUESTIONNAIRES
2. NOT BEING ABLE TO STOP OR CONTROL WORRYING: NOT AT ALL
GAD7 TOTAL SCORE: 0
7. FEELING AFRAID AS IF SOMETHING AWFUL MIGHT HAPPEN: NOT AT ALL
6. BECOMING EASILY ANNOYED OR IRRITABLE: NOT AT ALL
5. BEING SO RESTLESS THAT IT IS HARD TO SIT STILL: NOT AT ALL
3. WORRYING TOO MUCH ABOUT DIFFERENT THINGS: NOT AT ALL
1. FEELING NERVOUS, ANXIOUS, OR ON EDGE: NOT AT ALL
IF YOU CHECKED OFF ANY PROBLEMS ON THIS QUESTIONNAIRE, HOW DIFFICULT HAVE THESE PROBLEMS MADE IT FOR YOU TO DO YOUR WORK, TAKE CARE OF THINGS AT HOME, OR GET ALONG WITH OTHER PEOPLE: NOT DIFFICULT AT ALL

## 2018-05-17 ASSESSMENT — PATIENT HEALTH QUESTIONNAIRE - PHQ9: 5. POOR APPETITE OR OVEREATING: NOT AT ALL

## 2018-05-17 NOTE — PATIENT INSTRUCTIONS
1. You are due for dilated eye exam.  Have the report sent to Dr. Lorenzo's office.  2. Fasting labs in 3 months

## 2018-05-17 NOTE — MR AVS SNAPSHOT
After Visit Summary   5/17/2018    Glory Birmingham    MRN: [id number]           Patient Information     Date Of Birth          1965        Visit Information        Provider Department      5/17/2018 1:00 PM Dinorah Lorenzo,  [facility]        Today's Diagnoses     Type 1 diabetes mellitus with microalbuminuria (H)    -  1      Care Instructions    1. You are due for dilated eye exam.  Have the report sent to Dr. Lorenzo's office.  2. Fasting labs in 3 months          Follow-ups after your visit        Your next 10 appointments already scheduled     May 25, 2018  1:00 PM CDT   Diabetic Education with Ryanne Everett RD   [facility] ([facility])    [address] [facility] [id number] [phone]              Future tests that were ordered for you today     Open Future Orders        Priority Expected Expires Ordered    Lipid panel reflex to direct LDL Fasting Routine 8/17/2018 5/17/2019 5/17/2018    TSH Routine 8/17/2018 5/17/2019 5/17/2018    T4, free Routine 8/17/2018 5/17/2019 5/17/2018    Hemoglobin A1c Routine 8/16/2018 11/15/2018 5/17/2018            Who to contact     If you have questions or need follow up information about today's clinic visit or your schedule please contact [facility] directly at [phone].  Normal or non-critical lab and imaging results will be communicated to you by MyChart, letter or phone within 4 business days after the clinic has received the results. If you do not hear from us within 7 days, please contact the clinic through MyChart or phone. If you have a critical or abnormal lab result, we will notify you by phone as soon as possible.  Submit refill requests through Freever or call your pharmacy and they will forward the refill request to us. Please allow 3 business days for your refill to be completed.          Additional Information About Your Visit        MyChart  "Information     Kenny gives you secure access to your electronic health record. If you see a primary care provider, you can also send messages to your care team and make appointments. If you have questions, please call your primary care clinic.  If you do not have a primary care provider, please call [phone] and they will assist you.        Care EveryWhere ID     This is your Care EveryWhere ID. This could be used by other organizations to access your [place] medical records  [id number]        Your Vitals Were     Pulse Temperature Height Pulse Oximetry Breastfeeding? BMI (Body Mass Index)    89 98  F (36.7  C) (Tympanic) 5' 3.78\" (1.62 m) 100% No 26.96 kg/m2       Blood Pressure from Last 3 Encounters:   05/17/18 118/65   11/16/17 133/77   11/14/17 122/70    Weight from Last 3 Encounters:   05/17/18 156 lb (70.8 kg)   11/09/17 154 lb 6.4 oz (70 kg)   08/15/17 155 lb 12.8 oz (70.7 kg)                 Today's Medication Changes          These changes are accurate as of 5/17/18  2:01 PM.  If you have any questions, ask your nurse or doctor.               These medicines have changed or have updated prescriptions.        Dose/Directions    * insulin aspart 100 UNIT/ML injection   Commonly known as:  NOVOPEN ECHO   This may have changed:  Another medication with the same name was changed. Make sure you understand how and when to take each.   Used for:  Type 1 diabetes mellitus with microalbuminuria (H)   Changed by:  Dinorah Lorenzo DO        Insulin to carb ratio 1 unit to 12 grams with food.  Sliding scale with meals   Quantity:  15 mL   Refills:  5       * insulin aspart 100 UNIT/ML injection   Commonly known as:  NovoLOG PEN   This may have changed:  additional instructions   Used for:  Type 1 diabetes mellitus with microalbuminuria (H)   Changed by:  Dinorah Lorenzo, DO        1 unit per 8 grams of carb at meals and per sliding scale   Quantity:  15 mL   Refills:  0       insulin degludec 100 " UNIT/ML pen   Commonly known as:  TRESIBA   This may have changed:    - how much to take  - how to take this  - when to take this  - additional instructions   Used for:  Type 1 diabetes mellitus with microalbuminuria (H)   Changed by:  Dinorah Lorenzo DO        8 units in AM and 7 units in PM   Quantity:  15 mL   Refills:  11       * Notice:  This list has 2 medication(s) that are the same as other medications prescribed for you. Read the directions carefully, and ask your doctor or other care provider to review them with you.         Where to get your medicines      These medications were sent to [id number] [place] [place] [facility] [address]  [address] [facility] [address]     Phone:  [phone]     insulin aspart 100 UNIT/ML injection    insulin degludec 100 UNIT/ML pen                Primary Care Provider Office Phone # Fax #    Dinorah Lorenzo [phone] [address] [facility] [address]        Equal Access to Services     RIP MARTIN AH: Hadii aad ku hadasho Soomaali, waaxda luqadaha, qaybta kaalmada adeegyada, waxay idiin hayaan adeeg khluc jackson . [facility] [phone].    ATENCIÓN: Si habla español, tiene a garcia disposición servicios gratuitos de asistencia lingüística. [facility] [phone].    We comply with applicable federal civil rights laws and [place] laws. We do not discriminate on the basis of race, color, national origin, age, disability, sex, sexual orientation, or gender identity.            Thank you!     Thank you for choosing [facility]  for your care. Our goal is always to provide you with excellent care. Hearing back from our patients is one way we can continue to improve our services. Please take a few minutes to complete the written survey that you may receive in the mail after your visit with us. Thank you!             Your Updated Medication List - Protect others around you: Learn how to safely use, store and throw  away your medicines at www.disposemymeds.org.          This list is accurate as of 5/17/18  2:01 PM.  Always use your most recent med list.                   Brand Name Dispense Instructions for use Diagnosis    amitriptyline 10 MG tablet    ELAVIL    90 tablet    Take 1 tablet (10 mg) by mouth At Bedtime    Bladder pain       aspirin 81 MG tablet      1 TABLET DAILY        atorvastatin 10 MG tablet    LIPITOR    30 tablet    Take 5mg (one-half tablet) twice a week    Coronary artery disease due to lipid rich plaque       cholecalciferol 1000 UNIT tablet    vitamin D3    1 tablet    Take 1 tablet by mouth daily.        CINNAMON PO      Take 500 mg by mouth daily        CRANBERRY PO      Take 1 capsule by mouth daily        estradiol 0.5 MG tablet    ESTRACE    24 tablet    1 tab vaginallly twice a week    Atrophic vaginitis       glucagon 1 MG kit    GLUCAGON EMERGENCY    1 mg    Inject 1 mg Subcutaneous once for 1 dose    Type 1 diabetes mellitus with hyperglycemia (H)       * insulin aspart 100 UNIT/ML injection    NOVOPEN ECHO    15 mL    Insulin to carb ratio 1 unit to 12 grams with food.  Sliding scale with meals    Type 1 diabetes mellitus with microalbuminuria (H)       * insulin aspart 100 UNIT/ML injection    NovoLOG PEN    15 mL    1 unit per 8 grams of carb at meals and per sliding scale    Type 1 diabetes mellitus with microalbuminuria (H)       insulin degludec 100 UNIT/ML pen    TRESIBA    15 mL    8 units in AM and 7 units in PM    Type 1 diabetes mellitus with microalbuminuria (H)       losartan 25 MG tablet    COZAAR    45 tablet    Take 0.5 tablets (12.5 mg) by mouth daily    Type 1 diabetes mellitus with microalbuminuria (H)       magnesium 250 MG tablet      Take 1 tablet by mouth daily.        metoprolol succinate 25 MG 24 hr tablet    TOPROL-XL    90 tablet    Take 1 tablet (25 mg) by mouth daily    Benign essential hypertension       nitroGLYcerin 0.4 MG sublingual tablet    NITROSTAT    25  tablet    Place 1 tablet (0.4 mg) under the tongue every 5 minutes as needed for chest pain    CAD (coronary artery disease)       OMEGA-3 FATTY ACIDS PO      Take 500 mg by mouth daily        Zinc 15 MG Caps      Take 1 capsule by mouth daily        * Notice:  This list has 2 medication(s) that are the same as other medications prescribed for you. Read the directions carefully, and ask your doctor or other care provider to review them with you.

## 2018-05-17 NOTE — PROGRESS NOTES
SUBJECTIVE:   Glory Birmingham is a 52 year old female who presents to clinic today for the following health issues:      Review : nitroglycerin (NITROSTAT) 0.4 MG SL tablet = pt declined refill today    Diabetes Follow-up    Patient is checking blood sugars: couple times times daily.    Blood sugar testing frequency justification: Uncontrolled diabetes  Results are as follows:         Average 128    Diabetic concerns: None     Symptoms of hypoglycemia (low blood sugar): none     Paresthesias (numbness or burning in feet) or sores: Yes      Date of last diabetic eye exam: going in 18     After mother  in 2018, she developed new motivation to take better care of her diabetes.  Multiple family members have had severe complications of diabetes.  She is now dedicated to take better care of this to honor her late mother.    Diabetes Checklist    1. A1c optimal? YES for first time in YEARS    --Eats 2 meals per day  --Checks blood sugar via DEXCOM  --Forgets to take diabetes medications: NEVER  --Knows how to carb count: religiously   --Exercise regular  --NO hypoglycemia in 3 months!  She can have blood sugar at 75 and feel well.  --following low carb diet - 'ketogenic'.  Keeping close records of food and blood sugar   --she is using Sugar Stat nati to help graph her blood sugar and make adjustments.  --she has found that many artifical sugars raise her blood sugar significantly (Sorbitol).  She is using almond flour in place of regular flour  --she has ketone strips that she monitors.  They are mildly elevated  (0.5-3) is what she reports is acceptable on her ketogenic diet.  --she feels her fibromyalgia has significantly improved.  She has persistent mild weakness/pain in bilateral legs which she thinks is due to neuropathy.  --Depression, anxiety, gastroparesis and GERD have totally resolved.  --she has stopped Valium and Ritalin - sleep is improved,  Daytime fatigue is improved with new diet.   --she  wonders about stopping statin    Current diabetes medications:  --DexCom  --Tresiba 8 AM 7 PM.  When took all at HS, had 100 drop.  --Novolog Echo.  0.5 units: 10 mg/Dl BG drop for goal between .  1 unit per 8 grams of carb  115- 130:  0.5 units  131- 140: 1 unit  141- 150: 1.5 units  151- 160: 2 units  161- 170: 2.5 units  171 - 180: 3 units  181 - 190: 3.5 units  191 - 200: 4 units  201 - 210: 4.5 units  211 - 220: 5 units  221 - 230: 5.5 units  231 - 240:  6 units      2. BP optimal? YES  --Checks blood pressure at home NO    Current blood pressure medications:  --Losartan, metoprolol    Medications Tried:  --valsartan, verapamil    3. ASA use? YES    4. Statin use?  YES    5. Last Eye Exam  2017 is due    6. Smoking?  NO    7. Needs further formal diabetic education now?  YES, follow-up planned within 1 month  Last diabetes educator visit: 2017        Hyperlipidemia Follow-Up      Rate your low fat/cholesterol diet?: good    Taking statin?  Yes, no muscle aches from statin    Other lipid medications/supplements?:  none    Hypertension Follow-up      Outpatient blood pressures are not being checked.    Low Salt Diet: no added salt    BP Readings from Last 2 Encounters:   05/17/18 118/65   11/16/17 133/77     Hemoglobin A1C (%)   Date Value   05/14/2018 6.9 (H)   11/06/2017 10.2 (H)     LDL Cholesterol Calculated (mg/dL)   Date Value   08/14/2017 67   06/28/2016 84       Amount of exercise or physical activity: 2-3 days/week for an average of 30-45 minutes    Problems taking medications regularly: No    Medication side effects: none    Diet: regular (no restrictions)    Current Outpatient Prescriptions   Medication Sig Dispense Refill     amitriptyline (ELAVIL) 10 MG tablet Take 1 tablet (10 mg) by mouth At Bedtime 90 tablet 3     ASPIRIN 81 MG OR TABS 1 TABLET DAILY       atorvastatin (LIPITOR) 10 MG tablet Take 5mg (one-half tablet) twice a week 30 tablet 3     cholecalciferol (VITAMIN D) 1000 UNIT tablet  "Take 1 tablet by mouth daily. 1 tablet 0     CINNAMON PO Take 500 mg by mouth daily       CRANBERRY PO Take 1 capsule by mouth daily       estradiol (ESTRACE) 0.5 MG tablet 1 tab vaginallly twice a week 24 tablet 3     insulin aspart (NOVOLOG PEN) 100 UNIT/ML injection 1 unit per 12 grams of carb at meals, sliding scale: 150-190 +1, 191-230 +2, 231-270 +3, 271-310 +4, 311-350 +5, 351-390 +6, 391-430 +7, 431-470 +8 15 mL 0     insulin aspart (NOVOPEN ECHO) 100 UNIT/ML injection Insulin to carb ratio 1 unit to 12 grams with food.  Sliding scale with meals 15 mL 5     insulin degludec (TRESIBA) 100 UNIT/ML pen Inject 15 Units Subcutaneous At Bedtime 15 mL 3     losartan (COZAAR) 25 MG tablet Take 0.5 tablets (12.5 mg) by mouth daily 45 tablet 3     magnesium 250 MG tablet Take 1 tablet by mouth daily.       metoprolol (TOPROL-XL) 25 MG 24 hr tablet Take 1 tablet (25 mg) by mouth daily 90 tablet 3     OMEGA-3 FATTY ACIDS PO Take 500 mg by mouth daily        Zinc 15 MG CAPS Take 1 capsule by mouth daily        glucagon (GLUCAGON EMERGENCY) 1 MG injection Inject 1 mg Subcutaneous once for 1 dose 1 mg 1     nitroglycerin (NITROSTAT) 0.4 MG SL tablet Place 1 tablet (0.4 mg) under the tongue every 5 minutes as needed for chest pain (Patient not taking: Reported on 5/17/2018) 25 tablet 3     [DISCONTINUED] BD ULTRA FINE PEN NEEDLES Inject 1 each Subcutaneous 3 times daily. 1 [address]       Reviewed and updated as needed this visit by clinical staff  Tobacco  Allergies  Meds  Problems  Med Hx  Surg Hx  Fam Hx  Soc Hx        Reviewed and updated as needed this visit by Provider  Allergies  Meds  Problems         ROS:  Constitutional, HEENT, cardiovascular, pulmonary, gi and gu systems are negative, except as otherwise noted.    OBJECTIVE:     /65 (BP Location: Right arm, Patient Position: Chair, Cuff Size: Adult Regular)  Pulse 89  Temp 98  F (36.7  C) (Tympanic)  Ht 5' 3.78\" (1.62 m)  Wt 156 lb (70.8 kg)  " SpO2 100%  Breastfeeding? No  BMI 26.96 kg/m2  Body mass index is 26.96 kg/(m^2).  GENERAL APPEARANCE: healthy, alert and no distress    Diagnostic Test Results:  No results found for this or any previous visit (from the past 24 hour(s)).    ASSESSMENT/PLAN:     1. Type 1 diabetes mellitus with microalbuminuria (H) - remarkable improvement in A1C with starting a ketogenic diet.  She no longer has any hyperglycemia and has totally eliminated hypoglycemia.  She is keeping extremely close track of her blood sugars and her intake as well as her carb intake.  She is strongly considering stopping a statin, as she has typically had excellent cholesterol, and she is worried about long-term risks of statin use.  Discussed risks and benefits of statins in diabetic patients.  For now she is decided to continue, but will likely do more research and possibly stop.  Recheck labs in 3 months  - Lipid panel reflex to direct LDL Fasting; Future  - TSH; Future  - T4, free; Future  - Hemoglobin A1c; Future  - insulin degludec (TRESIBA) 100 UNIT/ML pen; 8 units in AM and 7 units in PM  Dispense: 15 mL; Refill: 11  - insulin aspart (NOVOPEN ECHO) 100 UNIT/ML injection; Insulin to carb ratio 1:8 grams with food.  Sliding scale with meals  115- 130:  0.5 units  131- 140: 1 unit  141- 150: 1.5 units  151- 160: 2 units  161- 170: 2.5 units  171 - 180: 3 units  181 - 190: 3.5 units  191 - 200: 4 units  201 - 210: 4.5 units  211 - 220: 5 units  221 - 230: 5.5 units  231 - 240:  6 units  Max 30 units/day  Dispense: 15 mL; Refill: 5    2. Diabetic gastroparesis (H) -resolved with her ketogenic diet    3. Fibromyalgia -resolved with her ketogenic diet    4. Gastroesophageal reflux disease without esophagitis-resolved with her ketogenic diet    5. Idiopathic hypersomnia with long sleep time-resolved with her ketogenic diet    6. Mild major depression (H)-resolved with her ketogenic diet    7. Subclinical hypothyroidism -with positive antibody.   We will need to monitor this closely once or twice per year as she may develop overt thyroid condition      Dinorah Lorenzo,   [facility]

## 2018-05-18 ASSESSMENT — PATIENT HEALTH QUESTIONNAIRE - PHQ9: SUM OF ALL RESPONSES TO PHQ QUESTIONS 1-9: 0

## 2018-05-18 ASSESSMENT — ANXIETY QUESTIONNAIRES: GAD7 TOTAL SCORE: 0

## 2018-05-24 ENCOUNTER — TELEPHONE (OUTPATIENT)
Dept: FAMILY MEDICINE | Facility: CLINIC | Age: 53
End: 2018-05-24

## 2018-05-25 ENCOUNTER — ALLIED HEALTH/NURSE VISIT (OUTPATIENT)
Dept: EDUCATION SERVICES | Facility: CLINIC | Age: 53
End: 2018-05-25
Payer: MEDICARE

## 2018-05-25 DIAGNOSIS — E10.65 TYPE 1 DIABETES MELLITUS WITH HYPERGLYCEMIA (H): Primary | Chronic | ICD-10-CM

## 2018-05-25 PROCEDURE — G0108 DIAB MANAGE TRN  PER INDIV: HCPCS | Performed by: DIETITIAN, REGISTERED

## 2018-05-25 NOTE — PROGRESS NOTES
Diabetes Self Management Training: Individual Review Visit    Glory Birmingham presents today for education related to Type 1 diabetes.    She is accompanied by self    Patient's diabetes management related comments/concerns: doing great, with combination of low carb eating and dexcom usage.    Patient's emotional response to diabetes: expresses readiness to learn    Patient would like this visit to be focused around the following diabetes-related behaviors and goals: Healthy Eating, Being Active, Monitoring and Taking Medication    ASSESSMENT:  Patient Problem List and Family Medical History reviewed for relevant medical history, current medical status, and diabetes risk factors.  Pt is following a keto genic diet, she is very careful in her assessment of blood sugars and does test her ketones if BG is running higher due to risk for DKA in type 1 diabetes.  PT feels better and her numbers have never been this good.  Current Diabetes Management per Patient:  Taking diabetes medications?   yes:     Diabetes Medication(s)     Diabetic Other Sig    glucagon (GLUCAGON EMERGENCY) 1 MG injection Inject 1 mg Subcutaneous once for 1 dose    Insulin Sig    insulin aspart (NOVOPEN ECHO) 100 UNIT/ML injection Insulin to carb ratio 1:8 grams with food.  Sliding scale with meals  115- 130:  0.5 units  131- 140: 1 unit  141- 150: 1.5 units  151- 160: 2 units  161- 170: 2.5 units  171 - 180: 3 units  181 - 190: 3.5 units  191 - 200: 4 units  201 - 210: 4.5 units  211 - 220: 5 units  221 - 230: 5.5 units  231 - 240:  6 units  Max 30 units/day    insulin degludec (TRESIBA) 100 UNIT/ML pen 8 units in AM and 7 units in PM          *Abbreviated insulin dose documentation key: Insulin Trade Name (hqciubpnj-jfhgy-tgzkgl-bedtime) - i.e. Humalog 5-5-5-0 (Humalog 5 units at breakfast, 5 units at lunch, and 5 units at dinner).    Past Diabetes Education: Yes    Patient glucose self monitoring as follows: on the dexcom.   BG meter: dexcom  "meter  BG results:            BG values are: In goal  Patient's most recent   Lab Results   Component Value Date    A1C 6.9 05/14/2018    is meeting goal of <7.0    Nutrition:  Breakfast 10- coffee with 1/2 Tablespoon of MCT (coconut oil), coconut almond milk 1/4 cup (no insulin)  11:30 to 12: omelete with broccoli or spinach 1/4 cup, 1/4 cup ricotta with whip cream on omelete with coconut or almond milk with breakfast, strawberry pcs  Snack- 3-4 grams of carb bread (almond flour)  Supper- uses orithrotol: swerve for baking  Meat 3oz and then vegetable 1 cup with two berries.    Occasionally something with fat or less than 2 grams of carb  Physical Activity:    Limitations: none  Some shopping, cleaning     Diabetes Risk Factors:  family history and age over 45 years    Diabetes Complications:  Not discussed today.    Vitals:  There were no vitals taken for this visit.  Estimated body mass index is 26.96 kg/(m^2) as calculated from the following:    Height as of 5/17/18: 1.62 m (5' 3.78\").    Weight as of 5/17/18: 70.8 kg (156 lb).   Last 3 BP:   BP Readings from Last 3 Encounters:   05/17/18 118/65   11/16/17 133/77   11/14/17 122/70       History   Smoking Status     Never Smoker   Smokeless Tobacco     Never Used       Labs:  Lab Results   Component Value Date    A1C 6.9 05/14/2018     Lab Results   Component Value Date     11/14/2017     Lab Results   Component Value Date    LDL 67 08/14/2017     HDL Cholesterol   Date Value Ref Range Status   08/14/2017 85 >49 mg/dL Final   ]  GFR Estimate   Date Value Ref Range Status   11/14/2017 70 >60 mL/min/1.7m2 Final     Comment:     Non  GFR Calc     GFR Estimate If Black   Date Value Ref Range Status   11/14/2017 84 >60 mL/min/1.7m2 Final     Comment:      GFR Calc     Lab Results   Component Value Date    CR 0.85 11/14/2017     No results found for: MICROALBUMIN    Socio/Economic Considerations:    Support system: " family    Health Beliefs and Attitudes:   Patient Activation Measure Survey Score:  EDUARDO Score (Last Two) 3/7/2011   EDUARDO Raw Score 46   Activation Score 75.3   EDUARDO Level 4       Stage of Change: MAINTENANCE (Working to maintain change, with risk of relapse)      Diabetes knowledge and skills assessment:     Patient is knowledgeable in diabetes management concepts related to: Healthy Eating, Being Active, Monitoring and Taking Medication    Patient needs further education on the following diabetes management concepts: Healthy Eating, Being Active, Monitoring, Taking Medication, Problem Solving, Reducing Risks and Healthy Coping    Barriers to Learning Assessment: No Barriers identified    Based on learning assessment above, most appropriate setting for further diabetes education would be: Individual setting.    INTERVENTION:   No changes  Made sure pt is understanding of risk for DKA and monitoring for large ketones if BG starts to run higher  PT feels great and is off a few of medications she had been taking previously to get through the day/night.  Pt is a type a personality so if very meticulous of what she eats, her blood sugars which is important in doing this meal plan with type 1 diabetes.  Education provided today on:  AADE Self-Care Behaviors:  Problem Solving: assessment of ketones, diabetes care    Opportunities for ongoing education and support in diabetes-self management were discussed.    Pt verbalized understanding of concepts discussed and recommendations provided today.       Education Materials Provided:  No new materials provided today    PLAN:  See Patient Instructions for co-developed, patient-stated behavior change goals.  AVS printed and provided to patient today.    FOLLOW-UP:  Follow-up as needed.     Ongoing plan for education and support: Follow-up visit with diabetes educator as needed      Time Spent: 60 minutes  Encounter Type: Individual    Any diabetes medication dose changes were made  via the CDE Protocol and Collaborative Practice Agreement with the patient's primary care provider. A copy of this encounter was shared with the provider.

## 2018-05-25 NOTE — TELEPHONE ENCOUNTER
Dexcom, Medicare diabetic audit chart note request received, completed and faxed to [facility] (5/17/18 office visit) to [phone]

## 2018-05-25 NOTE — MR AVS SNAPSHOT
After Visit Summary   5/25/2018    Glory Darby    MRN: [id number]           Patient Information     Date Of Birth          1965        Visit Information        Provider Department      5/25/2018 1:00 PM Ryanne Everett RD [facility]        Care Instructions    1.  NO changes you are doing great.  Periodically send me email to go and check your sugar stats.          Follow-ups after your visit        Who to contact     If you have questions or need follow up information about today's clinic visit or your schedule please contact [facility] directly at [phone].  Normal or non-critical lab and imaging results will be communicated to you by SoundOuthart, letter or phone within 4 business days after the clinic has received the results. If you do not hear from us within 7 days, please contact the clinic through Spotstert or phone. If you have a critical or abnormal lab result, we will notify you by phone as soon as possible.  Submit refill requests through iDreamBooks or call your pharmacy and they will forward the refill request to us. Please allow 3 business days for your refill to be completed.          Additional Information About Your Visit        MyChart Information     iDreamBooks gives you secure access to your electronic health record. If you see a primary care provider, you can also send messages to your care team and make appointments. If you have questions, please call your primary care clinic.  If you do not have a primary care provider, please call [phone] and they will assist you.        Care EveryWhere ID     This is your Care EveryWhere ID. This could be used by other organizations to access your [facility] medical records  [id number]         Blood Pressure from Last 3 Encounters:   05/17/18 118/65   11/16/17 133/77   11/14/17 122/70    Weight from Last 3 Encounters:   05/17/18 70.8 kg (156 lb)   11/09/17 70 kg (154 lb 6.4 oz)   08/15/17  70.7 kg (155 lb 12.8 oz)              Today, you had the following     No orders found for display       Primary Care Provider Office Phone # Fax #    Dinorah Lorenzo [phone] [address] [facility] [address]        Equal Access to Services     RIP MARTIN : Hadii aad ku hadasho Soomaali, waaxda luqadaha, qaybta kaalmada adeegyada, waxay idiin hayaan adeeg khlaceygrant lamarilinn dayo. So [facility] [phone].    ATENCIÓN: Si habla español, tiene a garcia disposición servicios gratuitos de asistencia lingüística. Llame al [phone].    We comply with applicable federal civil rights laws and [place] laws. We do not discriminate on the basis of race, color, national origin, age, disability, sex, sexual orientation, or gender identity.            Thank you!     Thank you for choosing [facility]  for your care. Our goal is always to provide you with excellent care. Hearing back from our patients is one way we can continue to improve our services. Please take a few minutes to complete the written survey that you may receive in the mail after your visit with us. Thank you!             Your Updated Medication List - Protect others around you: Learn how to safely use, store and throw away your medicines at www.disposemymeds.org.          This list is accurate as of 5/25/18  2:15 PM.  Always use your most recent med list.                   Brand Name Dispense Instructions for use Diagnosis    amitriptyline 10 MG tablet    ELAVIL    90 tablet    Take 1 tablet (10 mg) by mouth At Bedtime    Bladder pain       aspirin 81 MG tablet      1 TABLET DAILY        atorvastatin 10 MG tablet    LIPITOR    30 tablet    Take 5mg (one-half tablet) twice a week    Coronary artery disease due to lipid rich plaque       cholecalciferol 1000 UNIT tablet    vitamin D3    1 tablet    Take 1 tablet by mouth daily.        CINNAMON PO      Take 500 mg by mouth daily        CRANBERRY PO      Take 1  capsule by mouth daily        estradiol 0.5 MG tablet    ESTRACE    24 tablet    1 tab vaginallly twice a week    Atrophic vaginitis       glucagon 1 MG kit    GLUCAGON EMERGENCY    1 mg    Inject 1 mg Subcutaneous once for 1 dose    Type 1 diabetes mellitus with hyperglycemia (H)       insulin aspart 100 UNIT/ML injection    NOVOPEN ECHO    15 mL    Insulin to carb ratio 1:8 grams with food.  Sliding scale with meals 115- 130:  0.5 units 131- 140: 1 unit 141- 150: 1.5 units 151- 160: 2 units 161- 170: 2.5 units 171 - 180: 3 units 181 - 190: 3.5 units 191 - 200: 4 units 201 - 210: 4.5 units 211 - 220: 5 units 221 - 230: 5.5 units 231 - 240:  6 units Max 30 units/day    Type 1 diabetes mellitus with microalbuminuria (H)       insulin degludec 100 UNIT/ML pen    TRESIBA    15 mL    8 units in AM and 7 units in PM    Type 1 diabetes mellitus with microalbuminuria (H)       losartan 25 MG tablet    COZAAR    45 tablet    Take 0.5 tablets (12.5 mg) by mouth daily    Type 1 diabetes mellitus with microalbuminuria (H)       magnesium 250 MG tablet      Take 1 tablet by mouth daily.        metoprolol succinate 25 MG 24 hr tablet    TOPROL-XL    90 tablet    Take 1 tablet (25 mg) by mouth daily    Benign essential hypertension       nitroGLYcerin 0.4 MG sublingual tablet    NITROSTAT    25 tablet    Place 1 tablet (0.4 mg) under the tongue every 5 minutes as needed for chest pain    CAD (coronary artery disease)       OMEGA-3 FATTY ACIDS PO      Take 500 mg by mouth daily        Zinc 15 MG Caps      Take 1 capsule by mouth daily

## 2018-06-05 ENCOUNTER — RADIANT APPOINTMENT (OUTPATIENT)
Dept: GENERAL RADIOLOGY | Facility: CLINIC | Age: 53
End: 2018-06-05
Attending: NURSE PRACTITIONER
Payer: MEDICARE

## 2018-06-05 ENCOUNTER — OFFICE VISIT (OUTPATIENT)
Dept: FAMILY MEDICINE | Facility: CLINIC | Age: 53
End: 2018-06-05
Payer: MEDICARE

## 2018-06-05 VITALS
HEART RATE: 91 BPM | OXYGEN SATURATION: 100 % | BODY MASS INDEX: 26.96 KG/M2 | DIASTOLIC BLOOD PRESSURE: 64 MMHG | WEIGHT: 156 LBS | TEMPERATURE: 97.8 F | SYSTOLIC BLOOD PRESSURE: 114 MMHG | RESPIRATION RATE: 18 BRPM
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
## 2018-06-05 DIAGNOSIS — M54.50 ACUTE MIDLINE LOW BACK PAIN WITHOUT SCIATICA: ICD-10-CM

## 2018-06-05 PROCEDURE — 72100 X-RAY EXAM L-S SPINE 2/3 VWS: CPT | Mod: FY

## 2018-06-05 PROCEDURE — 99213 OFFICE O/P EST LOW 20 MIN: CPT | Performed by: NURSE PRACTITIONER

## 2018-06-05 PROCEDURE — 72170 X-RAY EXAM OF PELVIS: CPT | Mod: FY

## 2018-06-05 RX ORDER — METHYLPREDNISOLONE 4 MG
TABLET, DOSE PACK ORAL
Qty: 21 TABLET | Refills: 0 | Status: SHIPPED | OUTPATIENT
Start: 2018-06-05 | End: 2018-08-24

## 2018-06-05 RX ORDER — CYCLOBENZAPRINE HCL 10 MG
5-10 TABLET ORAL 3 TIMES DAILY PRN
Qty: 30 TABLET | Refills: 1 | Status: SHIPPED | OUTPATIENT
Start: 2018-06-05 | End: 2018-08-24

## 2018-06-05 ASSESSMENT — PAIN SCALES - GENERAL: PAINLEVEL: EXTREME PAIN (8)

## 2018-06-05 NOTE — MR AVS SNAPSHOT
After Visit Summary   6/5/2018    Glory Birmingham    MRN: [id number]           Patient Information     Date Of Birth          1965        Visit Information        Provider Department      6/5/2018 1:00 PM Cathleen Acharya APRN CNP [facility]        Today's Diagnoses     Fall, initial encounter    -  1    Acute midline low back pain without sciatica          Care Instructions    By my read, no significant abnormalities on xray - no fracture. There is a moderate amount of stool in your colon which could be making things worse. Will update you if radiologist reads any different     Continue taking your probiotic, but also would add on Miralax 1 capful tonight, if not feeling cleared out 1 more tomorrow night, if too loose can do a 1/2 capful     Push fluids     Start Steroid dose pack and watch sugars     Can use Flexeril as needed    Ice pack as able and run through range of motion     If symptoms not improved after steroids and clearing out of stool, let me know and we'll order physical therapy           Follow-ups after your visit        Who to contact     If you have questions or need follow up information about today's clinic visit or your schedule please contact [facility] directly at [phone].  Normal or non-critical lab and imaging results will be communicated to you by MyChart, letter or phone within 4 business days after the clinic has received the results. If you do not hear from us within 7 days, please contact the clinic through Nascent Surgicalhart or phone. If you have a critical or abnormal lab result, we will notify you by phone as soon as possible.  Submit refill requests through Gazoob or call your pharmacy and they will forward the refill request to us. Please allow 3 business days for your refill to be completed.          Additional Information About Your Visit        MyChart Information     Gazoob gives you secure access to your electronic health  record. If you see a primary care provider, you can also send messages to your care team and make appointments. If you have questions, please call your primary care clinic.  If you do not have a primary care provider, please call [phone] and they will assist you.        Care EveryWhere ID     This is your Care EveryWhere ID. This could be used by other organizations to access your [facility] medical records  [id number]        Your Vitals Were     Pulse Temperature Respirations Pulse Oximetry Breastfeeding? BMI (Body Mass Index)    91 97.8  F (36.6  C) (Tympanic) 18 100% No 26.96 kg/m2       Blood Pressure from Last 3 Encounters:   06/05/18 114/64   05/17/18 118/65   11/16/17 133/77    Weight from Last 3 Encounters:   06/05/18 156 lb (70.8 kg)   05/17/18 156 lb (70.8 kg)   11/09/17 154 lb 6.4 oz (70 kg)                 Today's Medication Changes          These changes are accurate as of 6/5/18  2:19 PM.  If you have any questions, ask your nurse or doctor.               Start taking these medicines.        Dose/Directions    cyclobenzaprine 10 MG tablet   Commonly known as:  FLEXERIL   Used for:  Acute midline low back pain without sciatica   Started by:  Cathleen Acharya APRN CNP        Dose:  5-10 mg   Take 0.5-1 tablets (5-10 mg) by mouth 3 times daily as needed for muscle spasms   Quantity:  30 tablet   Refills:  1       methylPREDNISolone 4 MG tablet   Commonly known as:  MEDROL DOSEPAK   Used for:  Acute midline low back pain without sciatica   Started by:  Cathleen Acharya APRN CNP        Follow package instructions   Quantity:  21 tablet   Refills:  0            Where to get your medicines      These medications were sent to [facility] [address] [place] [address] [address]  [address] [facility] [address]     Phone:  [phone]     cyclobenzaprine 10 MG tablet    methylPREDNISolone 4 MG tablet                Primary Care Provider Office Phone # Fax #    Dinorah Lorenzo DO  [phone]       [address] [facility] [address]        Equal Access to Services     RIP MARTIN : Hadii aad ku hadblancadestiny Joseali, wanedda viktoromkarha, qasonita kaanahida olga, ana paula marcelinain hayaan isaccjordana hoang laCandelariaestela osheaJimmy So [facility] [phone].    ATENCIÓN: Si habla español, tiene a garcia disposición servicios gratuitos de asistencia lingüística. Llame al [phone].    We comply with applicable federal civil rights laws and [place] laws. We do not discriminate on the basis of race, color, national origin, age, disability, sex, sexual orientation, or gender identity.            Thank you!     Thank you for choosing [facility]  for your care. Our goal is always to provide you with excellent care. Hearing back from our patients is one way we can continue to improve our services. Please take a few minutes to complete the written survey that you may receive in the mail after your visit with us. Thank you!             Your Updated Medication List - Protect others around you: Learn how to safely use, store and throw away your medicines at www.disposemymeds.org.          This list is accurate as of 6/5/18  2:19 PM.  Always use your most recent med list.                   Brand Name Dispense Instructions for use Diagnosis    amitriptyline 10 MG tablet    ELAVIL    90 tablet    Take 1 tablet (10 mg) by mouth At Bedtime    Bladder pain       aspirin 81 MG tablet      1 TABLET DAILY        atorvastatin 10 MG tablet    LIPITOR    30 tablet    Take 5mg (one-half tablet) twice a week    Coronary artery disease due to lipid rich plaque       cholecalciferol 1000 UNIT tablet    vitamin D3    1 tablet    Take 1 tablet by mouth daily.        CINNAMON PO      Take 500 mg by mouth daily        CRANBERRY PO      Take 1 capsule by mouth daily        cyclobenzaprine 10 MG tablet    FLEXERIL    30 tablet    Take 0.5-1 tablets (5-10 mg) by mouth 3 times daily as needed for muscle spasms    Acute midline low  back pain without sciatica       estradiol 0.5 MG tablet    ESTRACE    24 tablet    1 tab vaginallly twice a week    Atrophic vaginitis       glucagon 1 MG kit    GLUCAGON EMERGENCY    1 mg    Inject 1 mg Subcutaneous once for 1 dose    Type 1 diabetes mellitus with hyperglycemia (H)       insulin aspart 100 UNIT/ML injection    NOVOPEN ECHO    15 mL    Insulin to carb ratio 1:8 grams with food.  Sliding scale with meals 115- 130:  0.5 units 131- 140: 1 unit 141- 150: 1.5 units 151- 160: 2 units 161- 170: 2.5 units 171 - 180: 3 units 181 - 190: 3.5 units 191 - 200: 4 units 201 - 210: 4.5 units 211 - 220: 5 units 221 - 230: 5.5 units 231 - 240:  6 units Max 30 units/day    Type 1 diabetes mellitus with microalbuminuria (H)       insulin degludec 100 UNIT/ML pen    TRESIBA    15 mL    8 units in AM and 7 units in PM    Type 1 diabetes mellitus with microalbuminuria (H)       losartan 25 MG tablet    COZAAR    45 tablet    Take 0.5 tablets (12.5 mg) by mouth daily    Type 1 diabetes mellitus with microalbuminuria (H)       magnesium 250 MG tablet      Take 1 tablet by mouth daily.        methylPREDNISolone 4 MG tablet    MEDROL DOSEPAK    21 tablet    Follow package instructions    Acute midline low back pain without sciatica       metoprolol succinate 25 MG 24 hr tablet    TOPROL-XL    90 tablet    Take 1 tablet (25 mg) by mouth daily    Benign essential hypertension       nitroGLYcerin 0.4 MG sublingual tablet    NITROSTAT    25 tablet    Place 1 tablet (0.4 mg) under the tongue every 5 minutes as needed for chest pain    CAD (coronary artery disease)       OMEGA-3 FATTY ACIDS PO      Take 500 mg by mouth daily        Zinc 15 MG Caps      Take 1 capsule by mouth daily

## 2018-06-05 NOTE — NURSING NOTE
"Chief Complaint   Patient presents with     Trauma       Initial Breastfeeding? No Estimated body mass index is 26.96 kg/(m^2) as calculated from the following:    Height as of 5/17/18: 5' 3.78\" (1.62 m).    Weight as of 5/17/18: 156 lb (70.8 kg).      Health Maintenance that is potentially due pending provider review:  NONE    n/a      "

## 2018-06-05 NOTE — PATIENT INSTRUCTIONS
By my read, no significant abnormalities on xray - no fracture. There is a moderate amount of stool in your colon which could be making things worse. Will update you if radiologist reads any different     Continue taking your probiotic, but also would add on Miralax 1 capful tonight, if not feeling cleared out 1 more tomorrow night, if too loose can do a 1/2 capful     Push fluids     Start Steroid dose pack and watch sugars     Can use Flexeril as needed    Ice pack as able and run through range of motion     If symptoms not improved after steroids and clearing out of stool, let me know and we'll order physical therapy

## 2018-06-05 NOTE — PROGRESS NOTES
SUBJECTIVE:   Glory Birmingham is a 52 year old female who presents to clinic today for the following health issues:      Fall on tailbone      Duration: 1 month ago, worse the last week    Description (location/character/radiation): pain on coccyx and some soreness on left upper leg and buttock    Intensity:  Severe especially when squatting, bending    Accompanying signs and symptoms: blood sugar has elevated at 150 for 1 week when usually 110    History (similar episodes/previous evaluation): None    Precipitating or alleviating factors: fell from 5th wheel bed on tailbone and left upper leg and buttock. Seemed to be ok but last week has been unbearable    Therapies tried and outcome: otc pain patch, tylenol is some help     Coming down forward facing from bunks in fifth wheel and slipped and landed on last stair of ladder  Didn't feel anything the first couple of weeks, maybe a little sore - last couple of weeks more sore  Has been lifting and carrying grand kids since being home    Unable to do ibuprofen due to history of gastritis   Has used the over the counter lidocaine patches - seems to help very little   No radiation or numbness, sensation otherwise at baseline neuropathy 2/2 diabetes     Difficulty with squatting or bending   History of lumbar stenosis - can be bothered by this with extended activity prior to fall         Problem list and histories reviewed & adjusted, as indicated.  Additional history: as documented    Patient Active Problem List   Diagnosis     Type 1 diabetes mellitus with hyperglycemia (HCC)     Gastroesophageal reflux disease without esophagitis     Benign essential hypertension     Amenorrhea     Thyroid nodules     Scleredema of Buschke (H)     Coronary artery disease involving native coronary artery of native heart with angina pectoris with documented spasm (H)     Idiopathic hypersomnia with long sleep time     Type 1 diabetes mellitus with diabetic neuropathy (H)     Mild major  depression (H)     Fibromyalgia     Generalized anxiety disorder     History of bilateral saline breast implants     Subclinical hypothyroidism     Hyperlipidemia LDL goal <70     Senile cataract     Organ transplant candidate     Recurrent UTI     Proliferative diabetic retinopathy associated with type 1 diabetes mellitus, macular edema presence unspecified     Diabetic gastroparesis (H)     Coccydynia     Atrophic vaginitis     Hypoglycemia unawareness in type 1 diabetes mellitus (H)     Type 1 diabetes mellitus with proliferative retinopathy (H)     Cervicalgia     Bilateral occipital neuralgia     Lumbosacral spondylosis without myelopathy     Type 1 diabetes mellitus with microalbuminuria (H)     Past Surgical History:   Procedure Laterality Date     BENCH PANCREAS  12/10/2012    Procedure: BENCH PANCREAS;;  Surgeon: Soto Mills MD;  Location: [facility]     COLONOSCOPY N/A 6/23/2017    Procedure: COLONOSCOPY;  Colonoscopy  ;  Surgeon: Austin Wallace MD;  Location: [facility]     CYSTECTOMY PILONIDAL N/A 7/13/2016    Procedure: CYSTECTOMY PILONIDAL;  Surgeon: Austin Wallace MD;  [facility]: [facility]     LAPAROSCOPY PROCEDURE UNLISTED  2007    diagnostic     MASTECTOMY, SIMPLE RT/LT/RASHAAD  2000    bilateral mastectomy, saline implants--fibrocystic disease--abnormal mammo-biopsies, etc--     MUSCLE BIOPSY  2008    back of neck,  path found tissue related to diabetes     PHACOEMULSIFICATION WITH STANDARD INTRAOCULAR LENS IMPLANT  7/28/2014    Procedure: PHACOEMULSIFICATION WITH STANDARD INTRAOCULAR LENS IMPLANT;  Surgeon: Roge Avendano MD;  [facility]     PHACOEMULSIFICATION WITH STANDARD INTRAOCULAR LENS IMPLANT Right 8/25/2016    Procedure: PHACOEMULSIFICATION WITH STANDARD INTRAOCULAR LENS IMPLANT;  Surgeon: Roge Avendano MD;  Location: [facility]     SURGICAL HISTORY OF -       vitrectomy, bilateral     SURGICAL HISTORY OF -       c/section x 3, BTL       Social History   Substance Use Topics      Smoking status: Never Smoker     Smokeless tobacco: Never Used     Alcohol use No     Family History   Problem Relation Age of Onset     GASTROINTESTINAL DISEASE Mother      Thyroid Disease Mother      Arthritis Mother      Cardiomyopathy Mother      CANCER Father      lung, spread to bone and brain     HEART DISEASE Father      Arthritis Father      DIABETES Sister           HEART DISEASE Sister      Thyroid Disease Sister      C.A.D. Sister      Obesity Daughter      Allergies Son      Depression Sister      Thyroid Disease Sister      Thyroid Disease Sister      Alcohol/Drug Brother          Current Outpatient Prescriptions   Medication Sig Dispense Refill     amitriptyline (ELAVIL) 10 MG tablet Take 1 tablet (10 mg) by mouth At Bedtime 90 tablet 3     ASPIRIN 81 MG OR TABS 1 TABLET DAILY       atorvastatin (LIPITOR) 10 MG tablet Take 5mg (one-half tablet) twice a week 30 tablet 3     cholecalciferol (VITAMIN D) 1000 UNIT tablet Take 1 tablet by mouth daily. 1 tablet 0     CINNAMON PO Take 500 mg by mouth daily       CRANBERRY PO Take 1 capsule by mouth daily       cyclobenzaprine (FLEXERIL) 10 MG tablet Take 0.5-1 tablets (5-10 mg) by mouth 3 times daily as needed for muscle spasms 30 tablet 1     estradiol (ESTRACE) 0.5 MG tablet 1 tab vaginallly twice a week 24 tablet 3     insulin aspart (NOVOPEN ECHO) 100 UNIT/ML injection Insulin to carb ratio 1:8 grams with food.  Sliding scale with meals  115- 130:  0.5 units  131- 140: 1 unit  141- 150: 1.5 units  151- 160: 2 units  161- 170: 2.5 units  171 - 180: 3 units  181 - 190: 3.5 units  191 - 200: 4 units  201 - 210: 4.5 units  211 - 220: 5 units  221 - 230: 5.5 units  231 - 240:  6 units  Max 30 units/day 15 mL 5     insulin degludec (TRESIBA) 100 UNIT/ML pen 8 units in AM and 7 units in PM 15 mL 11     losartan (COZAAR) 25 MG tablet Take 0.5 tablets (12.5 mg) by mouth daily 45 tablet 3     magnesium 250 MG tablet Take 1 tablet by mouth daily.        methylPREDNISolone (MEDROL DOSEPAK) 4 MG tablet Follow package instructions 21 tablet 0     metoprolol (TOPROL-XL) 25 MG 24 hr tablet Take 1 tablet (25 mg) by mouth daily 90 tablet 3     nitroglycerin (NITROSTAT) 0.4 MG SL tablet Place 1 tablet (0.4 mg) under the tongue every 5 minutes as needed for chest pain 25 tablet 3     OMEGA-3 FATTY ACIDS PO Take 500 mg by mouth daily        Zinc 15 MG CAPS Take 1 capsule by mouth daily        glucagon (GLUCAGON EMERGENCY) 1 MG injection Inject 1 mg Subcutaneous once for 1 dose 1 mg 1     [DISCONTINUED] BD ULTRA FINE PEN NEEDLES Inject 1 each Subcutaneous 3 times daily. 1 Box 11     Allergies   Allergen Reactions     Hmg-Coa-R Inhibitors Muscle Pain (Myalgia) and Cramps     Ibuprofen-Diphenhydramine Cit GI Disturbance     Severe gastritis     Lisinopril Cough     Choking spells       Prednisone GI Disturbance     Severe gastritis     Pregabalin Cramps       Reviewed and updated as needed this visit by clinical staff  Tobacco  Allergies  Meds  Problems  Med Hx  Surg Hx  Fam Hx  Soc Hx        Reviewed and updated as needed this visit by Provider  Tobacco  Allergies  Meds  Problems  Med Hx  Surg Hx  Fam Hx  Soc Hx          ROS:  Constitutional, HEENT, cardiovascular, pulmonary, gi and gu systems are negative, except as otherwise noted.    OBJECTIVE:     /64  Pulse 91  Temp 97.8  F (36.6  C) (Tympanic)  Resp 18  Wt 156 lb (70.8 kg)  SpO2 100%  Breastfeeding? No  BMI 26.96 kg/m2  Body mass index is 26.96 kg/(m^2).  GENERAL APPEARANCE: healthy, alert and mild distress  ORTHO: Lumber/Thoracic Spine Exam: Tender:  lumbar spinous processes, right para lumbar muscles, right sciatic notch  Non-tender:  Remainder of lumbar spine   Range of Motion:  lumbar flexion significantly decreased, painful, lumbar extension  decreased, pain-free - feels a stretch, left lateral lumbar bending  decreased, pain-free, right lateral lumbar bending  decreased, painful, left  lateral lumbar rotation full, painful, right lateral lumbar rotation  full, pain-free  Strength:  gastrocsoleus   5/5, hamstrings  5/5, quadriceps  5/5, tibialis anterior  5/5, peroneals  5/5  Special tests:  negative straight leg raises    Hip Exam: left greater trocanter non-tender, right greater trocanter non-tender      Diagnostic Test Results:  Xray - pelvis and lumbar  - independently read by ADELITA Good CNP - not noting any bony abnormalities or fractures. Do note a moderate amount of stool in the colon- will await final radiologist read     ASSESSMENT/PLAN:     1. Fall, initial encounter  Patient fell down on back/buttock approximately 1 month ago from bunks in [facility]. Didn't seem to bother much right away, 2 weeks ago started hurting more. Has done more activity and lifting in the last 2 weeks. Denies any radiation or saddle paraesthesias. No swelling, ecchymosis or erythema.   - XR Lumbar Spine 2/3 Views; Future  - XR Pelvis 1/2 Views; Future    2. Acute midline low back pain without sciatica  See above. Xrays independently read by ADELITA Good CNP not showing any bony abnormalities or fractures. Will wait final radiologist read and treat conservatively at this point. Clear out stool and start steroids and muscle relaxant. Would do physical therapy if not improving after that.   - XR Lumbar Spine 2/3 Views; Future  - XR Pelvis 1/2 Views; Future  - cyclobenzaprine (FLEXERIL) 10 MG tablet; Take 0.5-1 tablets (5-10 mg) by mouth 3 times daily as needed for muscle spasms  Dispense: 30 tablet; Refill: 1  - methylPREDNISolone (MEDROL DOSEPAK) 4 MG tablet; Follow package instructions  Dispense: 21 tablet; Refill: 0    Patient Instructions   By my read, no significant abnormalities on xray - no fracture. There is a moderate amount of stool in your colon which could be making things worse. Will update you if radiologist reads any different     Continue taking your probiotic, but also would add on  Miralax 1 capful tonight, if not feeling cleared out 1 more tomorrow night, if too loose can do a 1/2 capful     Push fluids     Start Steroid dose pack and watch sugars     Can use Flexeril as needed    Ice pack as able and run through range of motion     If symptoms not improved after steroids and clearing out of stool, let me know and we'll order physical therapy       ADELITA Dee [facility]

## 2018-06-26 ENCOUNTER — TRANSFERRED RECORDS (OUTPATIENT)
Dept: HEALTH INFORMATION MANAGEMENT | Facility: CLINIC | Age: 53
End: 2018-06-26

## 2018-07-05 ENCOUNTER — OFFICE VISIT (OUTPATIENT)
Dept: URGENT CARE | Facility: URGENT CARE | Age: 53
End: 2018-07-05
Payer: MEDICARE

## 2018-07-05 VITALS
HEART RATE: 107 BPM | BODY MASS INDEX: 26.96 KG/M2 | TEMPERATURE: 97.5 F | OXYGEN SATURATION: 100 % | DIASTOLIC BLOOD PRESSURE: 75 MMHG | WEIGHT: 156 LBS | RESPIRATION RATE: 18 BRPM | SYSTOLIC BLOOD PRESSURE: 145 MMHG

## 2018-07-05 DIAGNOSIS — L03.031 PARONYCHIA OF TOE, RIGHT: Primary | ICD-10-CM

## 2018-07-05 PROCEDURE — 99213 OFFICE O/P EST LOW 20 MIN: CPT | Performed by: NURSE PRACTITIONER

## 2018-07-05 RX ORDER — CEPHALEXIN 500 MG/1
500 CAPSULE ORAL 4 TIMES DAILY
Qty: 40 CAPSULE | Refills: 0 | Status: SHIPPED | OUTPATIENT
Start: 2018-07-05 | End: 2018-07-15

## 2018-07-05 NOTE — PATIENT INSTRUCTIONS
Take antibiotics as directed.  Since you are diabetic we want to be very diligent about this toe infection.    Warm soapy soaks 2-3 times daily.    Follow up with your primary care provider if symptoms worsen or do not resolve.    Follow-up with your primary care provider next week and as needed.    Indications for emergent return to emergency department discussed with patient, who verbalized good understanding and agreement.  Patient understands the limitations of today's evaluation.         Paronychia of the Finger or Toe  Paronychia is an infection near a fingernail or toenail. It usually occurs when an opening in the cuticle or an ingrown toenail lets bacteria under the skin.  The infection will need to be drained if pus is present. If the infection has been caught early, you may need only antibiotic treatment. Healing will take about 1 to 2 weeks.  Home care  Follow these guidelines when caring for yourself at home:    Clean and soak the toe or finger. Do this 2 times a day for the first 3 days. To do so:  ? Soak your foot or hand in a tub of warm water for 5 minutes. Or hold your toe or finger under a faucet of warm running water for 5 minutes.  ? Clean any crust away with soap and water using a cotton swab.  ? Put antibiotic ointment on the infected area.    Change the dressing daily or any time it gets dirty.    If you were given antibiotics, take them as directed until they are all gone.    If your infection is on a toe, wear comfortable shoes with a lot of toe room. You can also wear open-toed sandals while your toe heals.    You may use over-the-counter medicine (acetaminophen or ibuprofen to help with pain, unless another medicine was prescribed. If you have chronic liver or kidney disease, talk with your healthcare provider before using these medicines. Also talk with your provider if you've had a stomach ulcer or GI (gastrointestinal) bleeding.  Prevention  The following can prevent  paronychia:    Avoid cutting or playing with your cuticles at home.    Don't bite your nails.    Don't suck on your thumbs or fingers.  Follow-up care  Follow up with your healthcare provider, or as advised.  When to seek medical advice  Call your healthcare provider right away if any of these occur:    Redness, pain, or swelling of the finger or toe gets worse    Red streaks in the skin leading away from the wound    Pus or fluid draining from the nail area    Fever of 100.4 F (38 C) or higher, or as directed by your provider  Date Last Reviewed: 8/1/2016 2000-2017 [employer]. [address] [place] [address], [place] [address]. All rights reserved. This information is not intended as a substitute for professional medical care. Always follow your healthcare professional's instructions.

## 2018-07-05 NOTE — PROGRESS NOTES
SUBJECTIVE:   Glory Birmingham is a 52 year old female who presents to clinic today for the following health issues:  Chief Complaint   Patient presents with     Infection     right big toe, redness, swelling, discharge, warm to the touch, turning color, cut skin on toenail by accident                 Problem list and histories reviewed & adjusted, as indicated.  Additional history: as documented    Patient Active Problem List   Diagnosis     Type 1 diabetes mellitus with hyperglycemia (HCC)     Gastroesophageal reflux disease without esophagitis     Benign essential hypertension     Amenorrhea     Thyroid nodules     Scleredema of [place]chke (H)     Coronary artery disease involving native coronary artery of native heart with angina pectoris with documented spasm (H)     Idiopathic hypersomnia with long sleep time     Type 1 diabetes mellitus with diabetic neuropathy (H)     Mild major depression (H)     Fibromyalgia     Generalized anxiety disorder     History of bilateral saline breast implants     Subclinical hypothyroidism     Hyperlipidemia LDL goal <70     Senile cataract     Organ transplant candidate     Recurrent UTI     Proliferative diabetic retinopathy associated with type 1 diabetes mellitus, macular edema presence unspecified     Diabetic gastroparesis (H)     Coccydynia     Atrophic vaginitis     Hypoglycemia unawareness in type 1 diabetes mellitus (H)     Type 1 diabetes mellitus with proliferative retinopathy (H)     Cervicalgia     Bilateral occipital neuralgia     Lumbosacral spondylosis without myelopathy     Type 1 diabetes mellitus with microalbuminuria (H)     Past Surgical History:   Procedure Laterality Date     BENCH PANCREAS  12/10/2012    Procedure: BENCH PANCREAS;;  Surgeon: Soto Mills MD;  Location: [facility]     COLONOSCOPY N/A 6/23/2017    Procedure: COLONOSCOPY;  Colonoscopy  ;  Surgeon: Austin Wallace MD;  [facility]     CYSTECTOMY PILONIDAL N/A 7/13/2016    Procedure:  CYSTECTOMY PILONIDAL;  Surgeon: Austin Wallace MD;  [facility]     LAPAROSCOPY PROCEDURE UNLISTED      diagnostic     MASTECTOMY, SIMPLE RT/LT/RASHAAD      bilateral mastectomy, saline implants--fibrocystic disease--abnormal mammo-biopsies, etc--     MUSCLE BIOPSY      back of neck,  path found tissue related to diabetes     PHACOEMULSIFICATION WITH STANDARD INTRAOCULAR LENS IMPLANT  2014    Procedure: PHACOEMULSIFICATION WITH STANDARD INTRAOCULAR LENS IMPLANT;  Surgeon: Roge Avendano MD;  [facility]     PHACOEMULSIFICATION WITH STANDARD INTRAOCULAR LENS IMPLANT Right 2016    Procedure: PHACOEMULSIFICATION WITH STANDARD INTRAOCULAR LENS IMPLANT;  Surgeon: Roge Avendano MD;  Location: [facility]     SURGICAL HISTORY OF -       vitrectomy, bilateral     SURGICAL HISTORY OF -       c/section x 3, BTL       Social History   Substance Use Topics     Smoking status: Never Smoker     Smokeless tobacco: Never Used     Alcohol use No     Family History   Problem Relation Age of Onset     GASTROINTESTINAL DISEASE Mother      Thyroid Disease Mother      Arthritis Mother      Cardiomyopathy Mother      Cancer Father      lung, spread to bone and brain     HEART DISEASE Father      Arthritis Father      Diabetes Sister           HEART DISEASE Sister      Thyroid Disease Sister      C.A.D. Sister      Obesity Daughter      Allergies Son      Depression Sister      Thyroid Disease Sister      Thyroid Disease Sister      Alcohol/Drug Brother          Current Outpatient Prescriptions   Medication Sig Dispense Refill     ASPIRIN 81 MG OR TABS 1 TABLET DAILY       cephALEXin (KEFLEX) 500 MG capsule Take 1 capsule (500 mg) by mouth 4 times daily for 10 days 40 capsule 0     cholecalciferol (VITAMIN D) 1000 UNIT tablet Take 1 tablet by mouth daily. 1 tablet 0     CINNAMON PO Take 500 mg by mouth daily       CRANBERRY PO Take 1 capsule by mouth daily       estradiol (ESTRACE) 0.5 MG  tablet 1 tab vaginallly twice a week 24 tablet 3     insulin aspart (NOVOPEN ECHO) 100 UNIT/ML injection Insulin to carb ratio 1:8 grams with food.  Sliding scale with meals  115- 130:  0.5 units  131- 140: 1 unit  141- 150: 1.5 units  151- 160: 2 units  161- 170: 2.5 units  171 - 180: 3 units  181 - 190: 3.5 units  191 - 200: 4 units  201 - 210: 4.5 units  211 - 220: 5 units  221 - 230: 5.5 units  231 - 240:  6 units  Max 30 units/day 15 mL 5     insulin degludec (TRESIBA) 100 UNIT/ML pen 8 units in AM and 7 units in PM 15 mL 11     losartan (COZAAR) 25 MG tablet Take 0.5 tablets (12.5 mg) by mouth daily 45 tablet 3     magnesium 250 MG tablet Take 1 tablet by mouth daily.       metoprolol (TOPROL-XL) 25 MG 24 hr tablet Take 1 tablet (25 mg) by mouth daily 90 tablet 3     nitroglycerin (NITROSTAT) 0.4 MG SL tablet Place 1 tablet (0.4 mg) under the tongue every 5 minutes as needed for chest pain 25 tablet 3     OMEGA-3 FATTY ACIDS PO Take 500 mg by mouth daily        Zinc 15 MG CAPS Take 1 capsule by mouth daily        amitriptyline (ELAVIL) 10 MG tablet Take 1 tablet (10 mg) by mouth At Bedtime (Patient not taking: Reported on 7/5/2018) 90 tablet 3     atorvastatin (LIPITOR) 10 MG tablet Take 5mg (one-half tablet) twice a week (Patient not taking: Reported on 7/5/2018) 30 tablet 3     cyclobenzaprine (FLEXERIL) 10 MG tablet Take 0.5-1 tablets (5-10 mg) by mouth 3 times daily as needed for muscle spasms (Patient not taking: Reported on 7/5/2018) 30 tablet 1     glucagon (GLUCAGON EMERGENCY) 1 MG injection Inject 1 mg Subcutaneous once for 1 dose 1 mg 1     methylPREDNISolone (MEDROL DOSEPAK) 4 MG tablet Follow package instructions (Patient not taking: Reported on 7/5/2018) 21 tablet 0     [DISCONTINUED] BD ULTRA FINE PEN NEEDLES Inject 1 each Subcutaneous 3 times daily. 1 Box 11     Allergies   Allergen Reactions     Hmg-Coa-R Inhibitors Muscle Pain (Myalgia) and Cramps     Ibuprofen-Diphenhydramine Cit GI  Disturbance     Severe gastritis     Lisinopril Cough     Choking spells       Prednisone GI Disturbance     Severe gastritis     Pregabalin Cramps     Labs reviewed in EPIC    Reviewed and updated as needed this visit by clinical staff  Tobacco  Allergies  Meds  Problems  Med Hx  Surg Hx  Fam Hx  Soc Hx        Reviewed and updated as needed this visit by Provider  Allergies  Meds  Problems         ROS:  Constitutional, HEENT, cardiovascular, pulmonary, GI, , musculoskeletal, neuro, skin, endocrine and psych systems are negative, except as otherwise noted.    OBJECTIVE:     /75  Pulse 107  Temp 97.5  F (36.4  C) (Tympanic)  Resp 18  Wt 156 lb (70.8 kg)  SpO2 100%  BMI 26.96 kg/m2  Body mass index is 26.96 kg/(m^2).   GENERAL: healthy, alert and no distress, nontoxic in appearance  EYES: Eyes grossly normal to inspection, PERRL and conjunctivae and sclerae normal  HENT: normocephalic  NECK: supple with full ROM  RESP: lungs clear to auscultation - no rales, rhonchi or wheezes  CV: regular rate and rhythm, normal S1 S2, no S3 or S4, no murmur, click or rub, no peripheral edema  ABDOMEN: soft, nontender, no hepatosplenomegaly, no masses and bowel sounds normal  MS: no gross musculoskeletal defects noted, no edema, right great toe moderately red and swollen around medial aspect. She tore a bit of skin off bottom of toe with bandaid tape.    Diagnostic Test Results:  No results found for this or any previous visit (from the past 24 hour(s)).    ASSESSMENT/PLAN:     Problem List Items Addressed This Visit     None      Visit Diagnoses     Paronychia of toe, right    -  Primary    Relevant Medications    cephALEXin (KEFLEX) 500 MG capsule               Patient Instructions   Take antibiotics as directed.  Since you are diabetic we want to be very diligent about this toe infection.    Warm soapy soaks 2-3 times daily.    Follow up with your primary care provider if symptoms worsen or do not  resolve.    Follow-up with your primary care provider next week and as needed.    Indications for emergent return to emergency department discussed with patient, who verbalized good understanding and agreement.  Patient understands the limitations of today's evaluation.         Paronychia of the Finger or Toe  Paronychia is an infection near a fingernail or toenail. It usually occurs when an opening in the cuticle or an ingrown toenail lets bacteria under the skin.  The infection will need to be drained if pus is present. If the infection has been caught early, you may need only antibiotic treatment. Healing will take about 1 to 2 weeks.  Home care  Follow these guidelines when caring for yourself at home:    Clean and soak the toe or finger. Do this 2 times a day for the first 3 days. To do so:  ? Soak your foot or hand in a tub of warm water for 5 minutes. Or hold your toe or finger under a faucet of warm running water for 5 minutes.  ? Clean any crust away with soap and water using a cotton swab.  ? Put antibiotic ointment on the infected area.    Change the dressing daily or any time it gets dirty.    If you were given antibiotics, take them as directed until they are all gone.    If your infection is on a toe, wear comfortable shoes with a lot of toe room. You can also wear open-toed sandals while your toe heals.    You may use over-the-counter medicine (acetaminophen or ibuprofen to help with pain, unless another medicine was prescribed. If you have chronic liver or kidney disease, talk with your healthcare provider before using these medicines. Also talk with your provider if you've had a stomach ulcer or GI (gastrointestinal) bleeding.  Prevention  The following can prevent paronychia:    Avoid cutting or playing with your cuticles at home.    Don't bite your nails.    Don't suck on your thumbs or fingers.  Follow-up care  Follow up with your healthcare provider, or as advised.  When to seek medical  advice  Call your healthcare provider right away if any of these occur:    Redness, pain, or swelling of the finger or toe gets worse    Red streaks in the skin leading away from the wound    Pus or fluid draining from the nail area    Fever of 100.4 F (38 C) or higher, or as directed by your provider  Date Last Reviewed: 8/1/2016 2000-2017 [employer]. [address] [place] [address]. All rights reserved. This information is not intended as a substitute for professional medical care. Always follow your healthcare professional's instructions.            ADELITA [facility]

## 2018-07-05 NOTE — MR AVS SNAPSHOT
After Visit Summary   7/5/2018    Glory Birmingham    MRN: [id number]           Patient Information     Date Of Birth          1965        Visit Information        Provider Department      7/5/2018 5:35 PM Kamille Villalta APRN [facility]        Today's Diagnoses     Paronychia of toe, right    -  1      Care Instructions    Take antibiotics as directed.  Since you are diabetic we want to be very diligent about this toe infection.    Warm soapy soaks 2-3 times daily.    Follow up with your primary care provider if symptoms worsen or do not resolve.    Follow-up with your primary care provider next week and as needed.    Indications for emergent return to emergency department discussed with patient, who verbalized good understanding and agreement.  Patient understands the limitations of today's evaluation.         Paronychia of the Finger or Toe  Paronychia is an infection near a fingernail or toenail. It usually occurs when an opening in the cuticle or an ingrown toenail lets bacteria under the skin.  The infection will need to be drained if pus is present. If the infection has been caught early, you may need only antibiotic treatment. Healing will take about 1 to 2 weeks.  Home care  Follow these guidelines when caring for yourself at home:    Clean and soak the toe or finger. Do this 2 times a day for the first 3 days. To do so:  ? Soak your foot or hand in a tub of warm water for 5 minutes. Or hold your toe or finger under a faucet of warm running water for 5 minutes.  ? Clean any crust away with soap and water using a cotton swab.  ? Put antibiotic ointment on the infected area.    Change the dressing daily or any time it gets dirty.    If you were given antibiotics, take them as directed until they are all gone.    If your infection is on a toe, wear comfortable shoes with a lot of toe room. You can also wear open-toed sandals while your toe  heals.    You may use over-the-counter medicine (acetaminophen or ibuprofen to help with pain, unless another medicine was prescribed. If you have chronic liver or kidney disease, talk with your healthcare provider before using these medicines. Also talk with your provider if you've had a stomach ulcer or GI (gastrointestinal) bleeding.  Prevention  The following can prevent paronychia:    Avoid cutting or playing with your cuticles at home.    Don't bite your nails.    Don't suck on your thumbs or fingers.  Follow-up care  Follow up with your healthcare provider, or as advised.  When to seek medical advice  Call your healthcare provider right away if any of these occur:    Redness, pain, or swelling of the finger or toe gets worse    Red streaks in the skin leading away from the wound    Pus or fluid draining from the nail area    Fever of 100.4 F (38 C) or higher, or as directed by your provider  Date Last Reviewed: 8/1/2016 2000-2017 [employer]. [address] [place] [address]. All rights reserved. This information is not intended as a substitute for professional medical care. Always follow your healthcare professional's instructions.                Follow-ups after your visit        Follow-up notes from your care team     See patient instructions section of the AVS Return in about 4 days (around 7/9/2018) for Follow up with your primary care provider.      Who to contact     If you have questions or need follow up information about today's clinic visit or your schedule please contact [facility] directly at [phone].  Normal or non-critical lab and imaging results will be communicated to you by MyChart, letter or phone within 4 business days after the clinic has received the results. If you do not hear from us within 7 days, please contact the clinic through MyChart or phone. If you have a critical or abnormal lab result, we will notify you by phone  as soon as possible.  Submit refill requests through [employer] or call your pharmacy and they will forward the refill request to us. Please allow 3 business days for your refill to be completed.          Additional Information About Your Visit        MOBi-LEARNharscenios Information     [employer] gives you secure access to your electronic health record. If you see a primary care provider, you can also send messages to your care team and make appointments. If you have questions, please call your primary care clinic.  If you do not have a primary care provider, please call [phone] and they will assist you.        Care EveryWhere ID     This is your Care EveryWhere ID. This could be used by other organizations to access your [facility] medical records  [id number]        Your Vitals Were     Pulse Temperature Respirations Pulse Oximetry BMI (Body Mass Index)       107 97.5  F (36.4  C) (Tympanic) 18 100% 26.96 kg/m2        Blood Pressure from Last 3 Encounters:   07/05/18 145/75   06/05/18 114/64   05/17/18 118/65    Weight from Last 3 Encounters:   07/05/18 156 lb (70.8 kg)   06/05/18 156 lb (70.8 kg)   05/17/18 156 lb (70.8 kg)              Today, you had the following     No orders found for display         Today's Medication Changes          These changes are accurate as of 7/5/18  6:50 PM.  If you have any questions, ask your nurse or doctor.               Start taking these medicines.        Dose/Directions    cephALEXin 500 MG capsule   Commonly known as:  KEFLEX   Used for:  Paronychia of toe, right   Started by:  Kamille Villalta APRN CNP        Dose:  500 mg   Take 1 capsule (500 mg) by mouth 4 times daily for 10 days   Quantity:  40 capsule   Refills:  0            Where to get your medicines      These medications were sent to [facility] [address] [place] [address] [address]  [address] [facility] [address]     Phone:  [phone]     cephALEXin 500 MG capsule                Primary Care Provider  Office Phone # Fax #    Dinorah Lorenzo [phone] [address] [facility] [address]        Equal Access to Services     RIP MARTIN : Hadii aad ku hadblancao Somiquelali, waaxda luqadaha, qaybta kaalmada adejordanayada, ana paula price isaccjordana hoang manuel oshea. So [facility] [phone].    ATENCIÓN: Si habla español, tiene a garcia disposición servicios gratuitos de asistencia lingüística. Llame al [phone].    We comply with applicable federal civil rights laws and [place] laws. We do not discriminate on the basis of race, color, national origin, age, disability, sex, sexual orientation, or gender identity.            Thank you!     Thank you for choosing [facility]  for your care. Our goal is always to provide you with excellent care. Hearing back from our patients is one way we can continue to improve our services. Please take a few minutes to complete the written survey that you may receive in the mail after your visit with us. Thank you!             Your Updated Medication List - Protect others around you: Learn how to safely use, store and throw away your medicines at www.disposemymeds.org.          This list is accurate as of 7/5/18  6:50 PM.  Always use your most recent med list.                   Brand Name Dispense Instructions for use Diagnosis    amitriptyline 10 MG tablet    ELAVIL    90 tablet    Take 1 tablet (10 mg) by mouth At Bedtime    Bladder pain       aspirin 81 MG tablet      1 TABLET DAILY        atorvastatin 10 MG tablet    LIPITOR    30 tablet    Take 5mg (one-half tablet) twice a week    Coronary artery disease due to lipid rich plaque       cephALEXin 500 MG capsule    KEFLEX    40 capsule    Take 1 capsule (500 mg) by mouth 4 times daily for 10 days    Paronychia of toe, right       cholecalciferol 1000 UNIT tablet    vitamin D3    1 tablet    Take 1 tablet by mouth daily.        CINNAMON PO      Take 500 mg by mouth daily         CRANBERRY PO      Take 1 capsule by mouth daily        cyclobenzaprine 10 MG tablet    FLEXERIL    30 tablet    Take 0.5-1 tablets (5-10 mg) by mouth 3 times daily as needed for muscle spasms    Acute midline low back pain without sciatica       estradiol 0.5 MG tablet    ESTRACE    24 tablet    1 tab vaginallly twice a week    Atrophic vaginitis       glucagon 1 MG kit    GLUCAGON EMERGENCY    1 mg    Inject 1 mg Subcutaneous once for 1 dose    Type 1 diabetes mellitus with hyperglycemia (H)       insulin aspart 100 UNIT/ML injection    NOVOPEN ECHO    15 mL    Insulin to carb ratio 1:8 grams with food.  Sliding scale with meals 115- 130:  0.5 units 131- 140: 1 unit 141- 150: 1.5 units 151- 160: 2 units 161- 170: 2.5 units 171 - 180: 3 units 181 - 190: 3.5 units 191 - 200: 4 units 201 - 210: 4.5 units 211 - 220: 5 units 221 - 230: 5.5 units 231 - 240:  6 units Max 30 units/day    Type 1 diabetes mellitus with microalbuminuria (H)       insulin degludec 100 UNIT/ML pen    TRESIBA    15 mL    8 units in AM and 7 units in PM    Type 1 diabetes mellitus with microalbuminuria (H)       losartan 25 MG tablet    COZAAR    45 tablet    Take 0.5 tablets (12.5 mg) by mouth daily    Type 1 diabetes mellitus with microalbuminuria (H)       magnesium 250 MG tablet      Take 1 tablet by mouth daily.        methylPREDNISolone 4 MG tablet    MEDROL DOSEPAK    21 tablet    Follow package instructions    Acute midline low back pain without sciatica       metoprolol succinate 25 MG 24 hr tablet    TOPROL-XL    90 tablet    Take 1 tablet (25 mg) by mouth daily    Benign essential hypertension       nitroGLYcerin 0.4 MG sublingual tablet    NITROSTAT    25 tablet    Place 1 tablet (0.4 mg) under the tongue every 5 minutes as needed for chest pain    CAD (coronary artery disease)       OMEGA-3 FATTY ACIDS PO      Take 500 mg by mouth daily        Zinc 15 MG Caps      Take 1 capsule by mouth daily

## 2018-07-17 ENCOUNTER — TELEPHONE (OUTPATIENT)
Dept: FAMILY MEDICINE | Facility: CLINIC | Age: 53
End: 2018-07-17

## 2018-07-17 NOTE — TELEPHONE ENCOUNTER
Dr. Lorenzo please review pt hx of surgery and see if Mastectomy ([id number]) applies so pt can pass on mammogram quality list.  Arianna Matias CMA (AAMA)   (aka: Talia Matias)

## 2018-08-14 RX ORDER — DIAZEPAM 2 MG
TABLET ORAL
Qty: 30 TABLET | Refills: 5 | OUTPATIENT
Start: 2018-08-14

## 2018-08-14 NOTE — TELEPHONE ENCOUNTER
Requested Prescriptions   Pending Prescriptions Disp Refills     diazepam (VALIUM) 2 MG tablet [Pharmacy Med Name: DIAZEPAM 2MG        TAB]  Last Written Prescription Date:  11/9/17  Last Fill Quantity: 30,  # refills: 5   Last office visit: 6/5/2018 with prescribing provider:  Labine   Future Office Visit:   Next 5 appointments (look out 90 days)     Aug 24, 2018 11:00 AM CDT   SHORT with Dinorah Lorenzo DO   [facility]    [address] [facility] [address]   [phone]                  30 tablet 5     Sig: TAKE 1 TABLET BY MOUTH NIGHTLY AS NEEDED FOR ANXIETY OR SLEEP    There is no refill protocol information for this order

## 2018-08-14 NOTE — TELEPHONE ENCOUNTER
Signed CSA form in chart.    Valium 2 mg tablets    Last Written Prescription Date:  11/9/17, discontinued 5/17/18  Last Fill Quantity: 30,   # refills: 5  Last Office Visit: 6/5/18 [facility] Office visit:    Next 5 appointments (look out 90 days)     Aug 24, 2018 11:00 AM CDT   [facility]    [address] [facility] [address]   [phone]                   Routing refill request to provider for review/approval because:  Drug not on the [facility], [facility] or  Health refill protocol or controlled substance  Drug not active on patient's medication list

## 2018-08-14 NOTE — TELEPHONE ENCOUNTER
At our last visit in May, she reported she had stopped the valium.  Refill declined.  Can address at upcoming visit next week.

## 2018-08-14 NOTE — TELEPHONE ENCOUNTER
"I notified her, \"ok, we can talk then. The reason I requested it is we sold our house and have to be out and I am just stressed, I will do what I can and yes, talk to her at the appt, thanks, \"  Verónica Leon RNC    "

## 2018-08-21 DIAGNOSIS — R80.9 TYPE 1 DIABETES MELLITUS WITH MICROALBUMINURIA (H): Chronic | ICD-10-CM

## 2018-08-21 DIAGNOSIS — E10.29 TYPE 1 DIABETES MELLITUS WITH MICROALBUMINURIA (H): Chronic | ICD-10-CM

## 2018-08-21 LAB
CHOLEST SERPL-MCNC: 242 MG/DL
HBA1C MFR BLD: 7.6 % (ref 0–5.6)
HDLC SERPL-MCNC: 93 MG/DL
LDLC SERPL CALC-MCNC: 136 MG/DL
NONHDLC SERPL-MCNC: 149 MG/DL
T4 FREE SERPL-MCNC: 0.9 NG/DL (ref 0.76–1.46)
TRIGL SERPL-MCNC: 67 MG/DL
TSH SERPL DL<=0.005 MIU/L-ACNC: 10.12 MU/L (ref 0.4–4)

## 2018-08-21 PROCEDURE — 84439 ASSAY OF FREE THYROXINE: CPT | Performed by: INTERNAL MEDICINE

## 2018-08-21 PROCEDURE — 83036 HEMOGLOBIN GLYCOSYLATED A1C: CPT | Performed by: INTERNAL MEDICINE

## 2018-08-21 PROCEDURE — 80061 LIPID PANEL: CPT | Performed by: INTERNAL MEDICINE

## 2018-08-21 PROCEDURE — 84443 ASSAY THYROID STIM HORMONE: CPT | Performed by: INTERNAL MEDICINE

## 2018-08-21 PROCEDURE — 36415 COLL VENOUS BLD VENIPUNCTURE: CPT | Performed by: INTERNAL MEDICINE

## 2018-08-24 ENCOUNTER — OFFICE VISIT (OUTPATIENT)
Dept: FAMILY MEDICINE | Facility: CLINIC | Age: 53
End: 2018-08-24
Payer: MEDICARE

## 2018-08-24 VITALS
WEIGHT: 154 LBS | BODY MASS INDEX: 26.62 KG/M2 | DIASTOLIC BLOOD PRESSURE: 60 MMHG | OXYGEN SATURATION: 100 % | HEART RATE: 80 BPM | TEMPERATURE: 96.7 F | SYSTOLIC BLOOD PRESSURE: 120 MMHG

## 2018-08-24 DIAGNOSIS — M54.50 ACUTE MIDLINE LOW BACK PAIN WITHOUT SCIATICA: ICD-10-CM

## 2018-08-24 DIAGNOSIS — Z13.89 SCREENING FOR DIABETIC PERIPHERAL NEUROPATHY: ICD-10-CM

## 2018-08-24 DIAGNOSIS — F51.04 PSYCHOPHYSIOLOGICAL INSOMNIA: ICD-10-CM

## 2018-08-24 DIAGNOSIS — Z23 NEED FOR PROPHYLACTIC VACCINATION WITH TETANUS-DIPHTHERIA (TD): ICD-10-CM

## 2018-08-24 DIAGNOSIS — F41.1 GAD (GENERALIZED ANXIETY DISORDER): ICD-10-CM

## 2018-08-24 DIAGNOSIS — E10.65 TYPE 1 DIABETES MELLITUS WITH HYPERGLYCEMIA (H): Primary | Chronic | ICD-10-CM

## 2018-08-24 PROCEDURE — 99214 OFFICE O/P EST MOD 30 MIN: CPT | Mod: 25 | Performed by: INTERNAL MEDICINE

## 2018-08-24 PROCEDURE — 90715 TDAP VACCINE 7 YRS/> IM: CPT | Performed by: INTERNAL MEDICINE

## 2018-08-24 PROCEDURE — 90471 IMMUNIZATION ADMIN: CPT | Performed by: INTERNAL MEDICINE

## 2018-08-24 PROCEDURE — 99207 C FOOT EXAM  NO CHARGE: CPT | Performed by: INTERNAL MEDICINE

## 2018-08-24 RX ORDER — DIAZEPAM 2 MG
2 TABLET ORAL
Qty: 30 TABLET | Refills: 2 | Status: SHIPPED | OUTPATIENT
Start: 2018-08-24

## 2018-08-24 RX ORDER — CYCLOBENZAPRINE HCL 10 MG
5-10 TABLET ORAL 3 TIMES DAILY PRN
Qty: 30 TABLET | Refills: 3 | Status: SHIPPED | OUTPATIENT
Start: 2018-08-24 | End: 2019-04-17

## 2018-08-24 RX ORDER — NITROGLYCERIN 0.4 MG/1
0.4 TABLET SUBLINGUAL EVERY 5 MIN PRN
Qty: 25 TABLET | Refills: 3 | Status: CANCELLED | OUTPATIENT
Start: 2018-08-24

## 2018-08-24 NOTE — MR AVS SNAPSHOT
After Visit Summary   8/24/2018    Glory Birmingham    MRN: [id number]           Patient Information     Date Of Birth          1965        Visit Information        Provider Department      8/24/2018 11:00 AM Dinorah Lorenzo,  [facility]        Today's Diagnoses     Type 1 diabetes mellitus with hyperglycemia (HCC)    -  1    Screening for diabetic peripheral neuropathy        Need for prophylactic vaccination with tetanus-diphtheria (TD)        Acute midline low back pain without sciatica        EZEQUIEL (generalized anxiety disorder)        Psychophysiological insomnia          Care Instructions    1. Stop metoprolol due to fatigue. Continue losartan.  2. Valium filled  3. If blood sugar not turning around in next few weeks, then would increase the Novolog           Follow-ups after your visit        Who to contact     If you have questions or need follow up information about today's clinic visit or your schedule please contact [facility] directly at [phone].  Normal or non-critical lab and imaging results will be communicated to you by B[employer], letter or phone within 4 business days after the clinic has received the results. If you do not hear from us within 7 days, please contact the clinic through [employer] or phone. If you have a critical or abnormal lab result, we will notify you by phone as soon as possible.  Submit refill requests through [facility] or call your pharmacy and they will forward the refill request to us. Please allow 3 business days for your refill to be completed.          Additional Information About Your Visit        MyChart Information     [facility] gives you secure access to your electronic health record. If you see a primary care provider, you can also send messages to your care team and make appointments. If you have questions, please call your primary care clinic.  If you do not have a primary care provider, please call [phone] and they  will assist you.        Care EveryWhere ID     This is your Care EveryWhere ID. This could be used by other organizations to access your [facility] medical records  [id number]        Your Vitals Were     Pulse Temperature Pulse Oximetry Breastfeeding? BMI (Body Mass Index)       80 96.7  F (35.9  C) (Tympanic) 100% No 26.62 kg/m2        Blood Pressure from Last 3 Encounters:   08/24/18 120/60   07/05/18 145/75   06/05/18 114/64    Weight from Last 3 Encounters:   08/24/18 154 lb (69.9 kg)   07/05/18 156 lb (70.8 kg)   06/05/18 156 lb (70.8 kg)              We Performed the Following          ADMIN VACCINE, FIRST     FOOT EXAM  NO CHARGE [26076.114]     TDAP VACCINE (ADACEL)          Today's Medication Changes          These changes are accurate as of 8/24/18 11:50 AM.  If you have any questions, ask your nurse or doctor.               Start taking these medicines.        Dose/Directions    diazepam 2 MG tablet   Commonly known as:  VALIUM   Used for:  EZEQUIEL (generalized anxiety disorder), Psychophysiological insomnia   Started by:  Dinorah Lorenzo DO        Dose:  2 mg   Take 1 tablet (2 mg) by mouth nightly as needed for anxiety or sleep   Quantity:  30 tablet   Refills:  2       STATIN NOT PRESCRIBED (INTENTIONAL)   Used for:  Type 1 diabetes mellitus with hyperglycemia (H)   Started by:  Dinorah Lorenzo DO        Please choose reason not prescribed, below   Refills:  0         Stop taking these medicines if you haven't already. Please contact your care team if you have questions.     methylPREDNISolone 4 MG tablet   Commonly known as:  MEDROL DOSEPAK   Stopped by:  Dinorah Lorenzo DO           metoprolol succinate 25 MG 24 hr tablet   Commonly known as:  TOPROL-XL   Stopped by:  Dinorah Lorenzo DO                Where to get your medicines      These medications were [facility] to [facility] [address] [place] [address] [facility] [address] [facility] [facility]  [address] , [facility] [address]     Phone:   [phone]     cyclobenzaprine 10 MG tablet         Some of these will need a paper prescription and others can be bought over the counter.  Ask your nurse if you have questions.     Bring a paper prescription for each of these medications     diazepam 2 MG tablet       You don't need a prescription for these medications     STATIN NOT PRESCRIBED (INTENTIONAL)                Primary Care Provider Office Phone # Fax #    Dinorah Lorenzo,  [phone] [address] [facility] [address]        Equal Access to Services     RIP MARTIN : Hadii aad ku hadasho Soomaali, waaxda luqadaha, qaybta kaalmada adeegyada, waxay idiin haymodestan pawel jackson . So [facility] [phone].    ATENCIÓN: Si habla español, tiene a garcia disposición servicios gratuitos de asistencia lingüística. Llame al [phone].    We comply with applicable federal civil rights laws and [place] laws. We do not discriminate on the basis of race, color, national origin, age, disability, sex, sexual orientation, or gender identity.            Thank you!     Thank you for choosing [facility]  for your care. Our goal is always to provide you with excellent care. Hearing back from our patients is one way we can continue to improve our services. Please take a few minutes to complete the written survey that you may receive in the mail after your visit with us. Thank you!             Your Updated Medication List - Protect others around you: Learn how to safely use, store and throw away your medicines at www.disposemymeds.org.          This list is accurate as of 8/24/18 11:50 AM.  Always use your most recent med list.                   Brand Name Dispense Instructions for use Diagnosis    amitriptyline 10 MG tablet    ELAVIL    90 tablet    Take 1 tablet (10 mg) by mouth At Bedtime    Bladder pain       aspirin 81 MG tablet      1 TABLET DAILY        cholecalciferol 1000 UNIT tablet    vitamin D3    1 tablet    Take  1 tablet by mouth daily.        CINNAMON PO      Take 500 mg by mouth daily        CRANBERRY PO      Take 1 capsule by mouth daily        cyclobenzaprine 10 MG tablet    FLEXERIL    30 tablet    Take 0.5-1 tablets (5-10 mg) by mouth 3 times daily as needed for muscle spasms    Acute midline low back pain without sciatica       diazepam 2 MG tablet    VALIUM    30 tablet    Take 1 tablet (2 mg) by mouth nightly as needed for anxiety or sleep    EZEQUIEL (generalized anxiety disorder), Psychophysiological insomnia       estradiol 0.5 MG tablet    ESTRACE    24 tablet    1 tab vaginallly twice a week    Atrophic vaginitis       glucagon 1 MG kit    GLUCAGON EMERGENCY    1 mg    Inject 1 mg Subcutaneous once for 1 dose    Type 1 diabetes mellitus with hyperglycemia (H)       insulin aspart 100 UNIT/ML injection    NOVOPEN ECHO    15 mL    Insulin to carb ratio 1:8 grams with food.  Sliding scale with meals 115- 130:  0.5 units 131- 140: 1 unit 141- 150: 1.5 units 151- 160: 2 units 161- 170: 2.5 units 171 - 180: 3 units 181 - 190: 3.5 units 191 - 200: 4 units 201 - 210: 4.5 units 211 - 220: 5 units 221 - 230: 5.5 units 231 - 240:  6 units Max 30 units/day    Type 1 diabetes mellitus with microalbuminuria (H)       insulin degludec 100 UNIT/ML pen    TRESIBA    15 mL    8 units in AM and 7 units in PM    Type 1 diabetes mellitus with microalbuminuria (H)       losartan 25 MG tablet    COZAAR    45 tablet    Take 0.5 tablets (12.5 mg) by mouth daily    Type 1 diabetes mellitus with microalbuminuria (H)       magnesium 250 MG tablet      Take 1 tablet by mouth daily.        nitroGLYcerin 0.4 MG sublingual tablet    NITROSTAT    25 tablet    Place 1 tablet (0.4 mg) under the tongue every 5 minutes as needed for chest pain    CAD (coronary artery disease)       OMEGA-3 FATTY ACIDS PO      Take 500 mg by mouth daily        STATIN NOT PRESCRIBED (INTENTIONAL)      Please choose reason not prescribed, below    Type 1 diabetes  mellitus with hyperglycemia (H)       Zinc 15 MG Caps      Take 1 capsule by mouth daily

## 2018-08-24 NOTE — PATIENT INSTRUCTIONS
1. Stop metoprolol due to fatigue. Continue losartan.  2. Valium filled  3. If blood sugar not turning around in next few weeks, then would increase the Novolog

## 2018-08-24 NOTE — NURSING NOTE
Screening Questionnaire for Adult Immunization    Are you sick today?   Yes   Do you have allergies to medications, food, a vaccine component or latex?   No   Have you ever had a serious reaction after receiving a vaccination?   No   Do you have a long-term health problem with heart disease, lung disease, asthma, kidney disease, metabolic disease (e.g. diabetes), anemia, or other blood disorder?   Yes   Do you have cancer, leukemia, HIV/AIDS, or any other immune system problem?   No   In the past 3 months, have you taken medications that affect  your immune system, such as prednisone, other steroids, or anticancer drugs; drugs for the treatment of rheumatoid arthritis, Crohn s disease, or psoriasis; or have you had radiation treatments?   No   Have you had a seizure, or a brain or other nervous system problem?   No   During the past year, have you received a transfusion of blood or blood     products, or been given immune (gamma) globulin or antiviral drug?   No   For women: Are you pregnant or is there a chance you could become        pregnant during the next month?   No   Have you received any vaccinations in the past 4 weeks?   No     Immunization questionnaire was positive for at least one answer.  Notified Bj.        Per orders of Dr. Lorenzo, injection of TDAP given by Talia Matias. Patient instructed to remain in clinic for 15 minutes afterwards, and to report any adverse reaction to me immediately.       Screening performed by Talia Matias on 8/24/2018 at 11:55 AM.

## 2018-08-24 NOTE — PROGRESS NOTES
SUBJECTIVE:   Glory Birmingham is a 52 year old female who presents to clinic today for the following health issues:      Diabetes Follow-up    Patient is checking blood sugars: twice daily.    Blood sugar testing frequency justification: Uncontrolled diabetes  Results are as follows:         am - 100         lunchtime - 196    Diabetic concerns: None     Symptoms of hypoglycemia (low blood sugar): none     Paresthesias (numbness or burning in feet) or sores: Yes      Date of last diabetic eye exam: 6/23/16 in [facility] - PT signed a MARTHA today - [facility] in [place]     Had recent diabetes foot infection (paronychia), great toe of right toe, got antibiotic, since healed mostly.  blood sugar running higher since then.    Fasting blood sugar are good, but higher post-prandial, zara after carb.    tresiba 9/7    novolog 1:8 gram carb plus sliding scale.  Reports ave 7-8 units/day of novolog    Diabetes Management Resources    Hyperlipidemia Follow-Up      Rate your low fat/cholesterol diet?: good    Taking statin?  NO - patient declines, and has had muscle cramps    Other lipid medications/supplements?:  none    Hypertension Follow-up      Outpatient blood pressures are not being checked.    Low Salt Diet: no added salt    BP Readings from Last 2 Encounters:   08/24/18 120/60   07/05/18 145/75     Hemoglobin A1C (%)   Date Value   08/21/2018 7.6 (H)   05/14/2018 6.9 (H)     LDL Cholesterol Calculated (mg/dL)   Date Value   08/21/2018 136 (H)   08/14/2017 67       Amount of exercise or physical activity: 2-3 days/week for an average of 30-45 minutes    Problems taking medications regularly: No    Medication side effects: none    Diet: low salt    She stopped losartan and metoprolol, and blood pressure was 140 off this.  She restarted losartan and metoprolol 1 month ago.  She feels vague chest heaviness with exertion, that she did not feel off the losartan and metoprolol.  She overall just felt better (less fatigue) not taking  losartan and metoprolol.  She also recognizes that her blood sugar are running higher and this may constribute to her fatigue.      Patient meets the requirements for a continuous glucose monitor:   - Has diagnosis of Type 1 Diabetes Mellitus   - Has been checking blood sugars 4-6 times per day   - Is treated with insulin injections.  Frequency of injections is 5 times per day.  Takes 2 basal insulin injections and 3 mealtime insulin injections.   -  Changes to the insulin treatment regimen are made frequently by the patient based on daily blood sugars. (Patient makes daily adjustments based on pre meal blood sugars, bedtime blood sugar, blood sugar patterns around activity, variability in blood sugars etc).     She also thinks her blood sugar are higher after fall 6/5 with low back pain.  Has increase in pain with lumbar flex/ext.  Increase in activity due to moving, packing, etc.  She was given steroids and muscle relaxer. She wants refill flexeril, and plans to follow-up w/her chiro in [place] where she dur[place].  Plans to build house in [place]the state[place] - plans to establish with physician in [place] in Oct.  She wonders about restarting valium for sleep;anxiety - worse with changes of move.  She restarted amitriptyline due to anxiety.      Current Outpatient Prescriptions   Medication Sig Dispense Refill     amitriptyline (ELAVIL) 10 MG tablet Take 1 tablet (10 mg) by mouth At Bedtime 90 tablet 3     ASPIRIN 81 MG OR TABS 1 TABLET DAILY       cholecalciferol (VITAMIN D) 1000 UNIT tablet Take 1 tablet by mouth daily. 1 tablet 0     CINNAMON PO Take 500 mg by mouth daily       CRANBERRY PO Take 1 capsule by mouth daily       cyclobenzaprine (FLEXERIL) 10 MG tablet Take 0.5-1 tablets (5-10 mg) by mouth 3 times daily as needed for muscle spasms 30 tablet 3     estradiol (ESTRACE) 0.5 MG tablet 1 tab vaginallly twice a week 24 tablet 3     insulin aspart (NOVOPEN ECHO) 100 UNIT/ML injection Insulin to carb ratio 1:8  grams with food.  Sliding scale with meals  115- 130:  0.5 units  131- 140: 1 unit  141- 150: 1.5 units  151- 160: 2 units  161- 170: 2.5 units  171 - 180: 3 units  181 - 190: 3.5 units  191 - 200: 4 units  201 - 210: 4.5 units  211 - 220: 5 units  221 - 230: 5.5 units  231 - 240:  6 units  Max 30 units/day 15 mL 5     insulin degludec (TRESIBA) 100 UNIT/ML pen 8 units in AM and 7 units in PM 15 mL 11     losartan (COZAAR) 25 MG tablet Take 0.5 tablets (12.5 mg) by mouth daily 45 tablet 3     magnesium 250 MG tablet Take 1 tablet by mouth daily.       metoprolol (TOPROL-XL) 25 MG 24 hr tablet Take 1 tablet (25 mg) by mouth daily 90 tablet 3     OMEGA-3 FATTY ACIDS PO Take 500 mg by mouth daily        Zinc 15 MG CAPS Take 1 capsule by mouth daily        atorvastatin (LIPITOR) 10 MG tablet Take 5mg (one-half tablet) twice a week (Patient not taking: Reported on 7/5/2018) 30 tablet 3     glucagon (GLUCAGON EMERGENCY) 1 MG injection Inject 1 mg Subcutaneous once for 1 dose 1 mg 1     nitroglycerin (NITROSTAT) 0.4 MG SL tablet Place 1 tablet (0.4 mg) under the tongue every 5 minutes as needed for chest pain (Patient not taking: Reported on 8/24/2018) 25 tablet 3     [DISCONTINUED] BD ULTRA FINE PEN NEEDLES Inject 1 each Subcutaneous 3 times daily. 1 Box 11       Reviewed and updated as needed this visit by clinical staff  Tobacco  Allergies  Meds  Problems  Med Hx  Surg Hx  Fam Hx  Soc Hx        Reviewed and updated as needed this visit by Provider  Allergies  Meds  Problems         ROS:  Constitutional, HEENT, cardiovascular, pulmonary, gi and gu systems are negative, except as otherwise noted.    OBJECTIVE:     /60 (BP Location: Right arm, Patient Position: Sitting, Cuff Size: Adult Regular)  Pulse 80  Temp 96.7  F (35.9  C) (Tympanic)  Wt 154 lb (69.9 kg)  SpO2 100%  Breastfeeding? No  BMI 26.62 kg/m2  Body mass index is 26.62 kg/(m^2).  GENERAL APPEARANCE: healthy, alert and no  distress  DIABETIC FOOT EXAM: normal DP and PT pulses, no trophic changes or ulcerative lesions and reduced sensation at 9/10 spots on bilateral feet    Diagnostic Test Results:  No results found for this or any previous visit (from the past 24 hour(s)).    ASSESSMENT/PLAN:     1. Type 1 diabetes mellitus with hyperglycemia (HCC)  blood sugar higher than previous. Would increase insulin:carb ratio to 1:6, then if still not controlled, would increase sliding scale insulin by 1/2 unit  - STATIN NOT PRESCRIBED, INTENTIONAL,; Please choose reason not prescribed, below    2. Screening for diabetic peripheral neuropathy  - FOOT EXAM  NO CHARGE [52178.082]    3. Need for prophylactic vaccination with tetanus-diphtheria (TD)  - TDAP VACCINE (ADACEL)  -      ADMIN VACCINE, FIRST    4. Acute midline low back pain without sciatica- patient declines further work-up   - cyclobenzaprine (FLEXERIL) 10 MG tablet; Take 0.5-1 tablets (5-10 mg) by mouth 3 times daily as needed for muscle spasms  Dispense: 30 tablet; Refill: 3    5. EZEQUIEL (generalized anxiety disorder) - worsened, restart med  - diazepam (VALIUM) 2 MG tablet; Take 1 tablet (2 mg) by mouth nightly as needed for anxiety or sleep  Dispense: 30 tablet; Refill: 2    6. Psychophysiological insomnia - worsened, restart med  - diazepam (VALIUM) 2 MG tablet; Take 1 tablet (2 mg) by mouth nightly as needed for anxiety or sleep  Dispense: 30 tablet; Refill: 2    Patient Instructions   1. Stop metoprolol due to fatigue. Continue losartan.  2. Valium filled  3. If blood sugar not turning around in next few weeks, then would increase the Novolog       Dinorah Lorenzo,   [facility]

## 2018-10-08 ENCOUNTER — TELEPHONE (OUTPATIENT)
Dept: FAMILY MEDICINE | Facility: CLINIC | Age: 53
End: 2018-10-08

## 2018-10-09 NOTE — TELEPHONE ENCOUNTER
[employer] order for diabetic supplies started and routed to Dr. Lorenzo for review and signature.

## 2018-10-10 ENCOUNTER — MEDICAL CORRESPONDENCE (OUTPATIENT)
Dept: HEALTH INFORMATION MANAGEMENT | Facility: CLINIC | Age: 53
End: 2018-10-10

## 2018-10-11 NOTE — TELEPHONE ENCOUNTER
Form completed, signed and faxed to [facility] at [phone] along with office visit notes from 8/24/18

## 2018-10-20 ENCOUNTER — OFFICE VISIT (OUTPATIENT)
Dept: URGENT CARE | Facility: URGENT CARE | Age: 53
End: 2018-10-20
Payer: MEDICARE

## 2018-10-20 VITALS
RESPIRATION RATE: 20 BRPM | HEART RATE: 97 BPM | DIASTOLIC BLOOD PRESSURE: 70 MMHG | WEIGHT: 152.4 LBS | OXYGEN SATURATION: 100 % | SYSTOLIC BLOOD PRESSURE: 130 MMHG | BODY MASS INDEX: 26.34 KG/M2 | TEMPERATURE: 96.8 F

## 2018-10-20 DIAGNOSIS — R35.0 URINARY FREQUENCY: ICD-10-CM

## 2018-10-20 DIAGNOSIS — R82.90 NONSPECIFIC FINDING ON EXAMINATION OF URINE: ICD-10-CM

## 2018-10-20 DIAGNOSIS — N30.00 ACUTE CYSTITIS WITHOUT HEMATURIA: Primary | ICD-10-CM

## 2018-10-20 LAB
ALBUMIN UR-MCNC: NEGATIVE MG/DL
APPEARANCE UR: ABNORMAL
BACTERIA #/AREA URNS HPF: ABNORMAL /HPF
BILIRUB UR QL STRIP: NEGATIVE
COLOR UR AUTO: YELLOW
GLUCOSE UR STRIP-MCNC: 500 MG/DL
HGB UR QL STRIP: ABNORMAL
KETONES UR STRIP-MCNC: 15 MG/DL
LEUKOCYTE ESTERASE UR QL STRIP: ABNORMAL
NITRATE UR QL: NEGATIVE
NON-SQ EPI CELLS #/AREA URNS LPF: ABNORMAL /LPF
PH UR STRIP: 5.5 PH (ref 5–7)
RBC #/AREA URNS AUTO: ABNORMAL /HPF
SOURCE: ABNORMAL
SP GR UR STRIP: 1.01 (ref 1–1.03)
UROBILINOGEN UR STRIP-ACNC: 0.2 EU/DL (ref 0.2–1)
WBC #/AREA URNS AUTO: >100 /HPF

## 2018-10-20 PROCEDURE — 87086 URINE CULTURE/COLONY COUNT: CPT | Performed by: PHYSICIAN ASSISTANT

## 2018-10-20 PROCEDURE — 81001 URINALYSIS AUTO W/SCOPE: CPT | Performed by: PHYSICIAN ASSISTANT

## 2018-10-20 PROCEDURE — 87088 URINE BACTERIA CULTURE: CPT | Performed by: PHYSICIAN ASSISTANT

## 2018-10-20 PROCEDURE — 99213 OFFICE O/P EST LOW 20 MIN: CPT | Performed by: PHYSICIAN ASSISTANT

## 2018-10-20 PROCEDURE — 87186 SC STD MICRODIL/AGAR DIL: CPT | Performed by: PHYSICIAN ASSISTANT

## 2018-10-20 RX ORDER — CEPHALEXIN 500 MG/1
500 CAPSULE ORAL 2 TIMES DAILY
Qty: 14 CAPSULE | Refills: 0 | Status: SHIPPED | OUTPATIENT
Start: 2018-10-20 | End: 2018-10-27

## 2018-10-20 RX ORDER — PHENAZOPYRIDINE HYDROCHLORIDE 200 MG/1
200 TABLET, FILM COATED ORAL 3 TIMES DAILY PRN
Qty: 6 TABLET | Refills: 0 | Status: SHIPPED | OUTPATIENT
Start: 2018-10-20

## 2018-10-20 ASSESSMENT — ENCOUNTER SYMPTOMS
MYALGIAS: 0
ARTHRALGIAS: 0
EYE REDNESS: 0
VOMITING: 0
PALPITATIONS: 0
FATIGUE: 0
CHILLS: 0
FREQUENCY: 1
UNEXPECTED WEIGHT CHANGE: 0
EYE PAIN: 0
ABDOMINAL PAIN: 0
BACK PAIN: 0
SHORTNESS OF BREATH: 0
DYSURIA: 1
FEVER: 0
COUGH: 0
RHINORRHEA: 0
SORE THROAT: 0
HEMATURIA: 0
DIARRHEA: 0
WHEEZING: 0
SINUS PRESSURE: 0
FLANK PAIN: 0
TROUBLE SWALLOWING: 0
NAUSEA: 0
CHEST TIGHTNESS: 0

## 2018-10-20 NOTE — MR AVS SNAPSHOT
After Visit Summary   10/20/2018    Glory Darby    MRN: [id number]           Patient Information     Date Of Birth          1965        Visit Information        Provider Department      10/20/2018 1:25 PM Ella Breaux PA-C [facility]        Today's Diagnoses     Acute cystitis without hematuria    -  1    Urinary frequency        Nonspecific finding on examination of urine           Follow-ups after your visit        Follow-up notes from your care team     Return if symptoms worsen or fail to improve.      Who to contact     If you have questions or need follow up information about today's clinic visit or your schedule please contact [facility] directly at [phone].  Normal or non-critical lab and imaging results will be communicated to you by [employer], letter or phone within 4 business days after the clinic has received the results. If you do not hear from us within 7 days, please contact the clinic through [employer] or phone. If you have a critical or abnormal lab result, we will notify you by phone as soon as possible.  Submit refill requests through [employer] or call your pharmacy and they will forward the refill request to us. Please allow 3 business days for your refill to be completed.          Additional Information About Your Visit        MyChart Information     [employer] gives you secure access to your electronic health record. If you see a primary care provider, you can also send messages to your care team and make appointments. If you have questions, please call your primary care clinic.  If you do not have a primary care provider, please call [phone] and they will assist you.        Care EveryWhere ID     This is your Care EveryWhere ID. This could be used by other organizations to access your [facility] medical records  [id number]        Your Vitals Were     Pulse Temperature Respirations Pulse Oximetry BMI  (Body Mass Index)       97 96.8  F (36  C) (Tympanic) 20 100% 26.34 kg/m2        Blood Pressure from Last 3 Encounters:   10/20/18 130/70   08/24/18 120/60   07/05/18 145/75    Weight from Last 3 Encounters:   10/20/18 152 lb 6.4 oz (69.1 kg)   08/24/18 154 lb (69.9 kg)   07/05/18 156 lb (70.8 kg)              We Performed the Following     *UA reflex to Microscopic and Culture ([facility] (except [place] and [place])     Urine Culture Aerobic Bacterial     Urine Microscopic          Today's Medication Changes          These changes are accurate as of 10/20/18  4:34 PM.  If you have any questions, ask your nurse or doctor.               Start taking these medicines.        Dose/Directions    cephALEXin 500 MG capsule   Commonly known as:  KEFLEX   Used for:  Acute cystitis without hematuria   Started by:  Ella Breaux PA-C        Dose:  500 mg   Take 1 capsule (500 mg) by mouth 2 times daily for 7 days   Quantity:  14 capsule   Refills:  0       phenazopyridine 200 MG tablet   Commonly known as:  PYRIDIUM   Used for:  Acute cystitis without hematuria   Started by:  Ella Breaux PA-C        Dose:  200 mg   Take 1 tablet (200 mg) by mouth 3 times daily as needed for irritation   Quantity:  6 tablet   Refills:  0            Where to get your medicines      These medications were sent to [facility] [address] [place] [address]  [address] [address]     Phone:  [phone]     cephALEXin 500 MG capsule    phenazopyridine 200 MG tablet                Primary Care Provider Office Phone # Fax #    Dinorah Kirstin DO Bj [phone] [address] [facility] [address]        Equal Access to Services     RIP MARTIN AH: Michi Silva, wanedda luqadaha, qasonita kaalmada adeegyada, ana paula galarza [facility] [phone].    ATENCIÓN: Si habla español, tiene a garcia disposición servicios gratuitos de  asistencia lingüística. Mauro al [phone].    We comply with applicable federal civil rights laws and [place] laws. We do not discriminate on the basis of race, color, national origin, age, disability, sex, sexual orientation, or gender identity.            Thank you!     Thank you for choosing [facility]  for your care. Our goal is always to provide you with excellent care. Hearing back from our patients is one way we can continue to improve our services. Please take a few minutes to complete the written survey that you may receive in the mail after your visit with us. Thank you!             Your Updated Medication List - Protect others around you: Learn how to safely use, store and throw away your medicines at www.disposemymeds.org.          This list is accurate as of 10/20/18  4:34 PM.  Always use your most recent med list.                   Brand Name Dispense Instructions for use Diagnosis    amitriptyline 10 MG tablet    ELAVIL    90 tablet    Take 1 tablet (10 mg) by mouth At Bedtime    Bladder pain       aspirin 81 MG tablet      1 TABLET DAILY        cephALEXin 500 MG capsule    KEFLEX    14 capsule    Take 1 capsule (500 mg) by mouth 2 times daily for 7 days    Acute cystitis without hematuria       cholecalciferol 1000 UNIT tablet    vitamin D3    1 tablet    Take 1 tablet by mouth daily.        CINNAMON PO      Take 500 mg by mouth daily        CRANBERRY PO      Take 1 capsule by mouth daily        cyclobenzaprine 10 MG tablet    FLEXERIL    30 tablet    Take 0.5-1 tablets (5-10 mg) by mouth 3 times daily as needed for muscle spasms    Acute midline low back pain without sciatica       diazepam 2 MG tablet    VALIUM    30 tablet    Take 1 tablet (2 mg) by mouth nightly as needed for anxiety or sleep    EZEQUIEL (generalized anxiety disorder), Psychophysiological insomnia       estradiol 0.5 MG tablet    ESTRACE    24 tablet    1 tab vaginallly twice a week    Atrophic  vaginitis       glucagon 1 MG kit    GLUCAGON EMERGENCY    1 mg    Inject 1 mg Subcutaneous once for 1 dose    Type 1 diabetes mellitus with hyperglycemia (H)       insulin aspart 100 UNIT/ML injection    NOVOPEN ECHO    15 mL    Insulin to carb ratio 1:8 grams with food.  Sliding scale with meals 115- 130:  0.5 units 131- 140: 1 unit 141- 150: 1.5 units 151- 160: 2 units 161- 170: 2.5 units 171 - 180: 3 units 181 - 190: 3.5 units 191 - 200: 4 units 201 - 210: 4.5 units 211 - 220: 5 units 221 - 230: 5.5 units 231 - 240:  6 units Max 30 units/day    Type 1 diabetes mellitus with microalbuminuria (H)       insulin degludec 100 UNIT/ML pen    TRESIBA    15 mL    8 units in AM and 7 units in PM    Type 1 diabetes mellitus with microalbuminuria (H)       losartan 25 MG tablet    COZAAR    45 tablet    Take 0.5 tablets (12.5 mg) by mouth daily    Type 1 diabetes mellitus with microalbuminuria (H)       magnesium 250 MG tablet      Take 1 tablet by mouth daily.        nitroGLYcerin 0.4 MG sublingual tablet    NITROSTAT    25 tablet    Place 1 tablet (0.4 mg) under the tongue every 5 minutes as needed for chest pain    CAD (coronary artery disease)       OMEGA-3 FATTY ACIDS PO      Take 500 mg by mouth daily        phenazopyridine 200 MG tablet    PYRIDIUM    6 tablet    Take 1 tablet (200 mg) by mouth 3 times daily as needed for irritation    Acute cystitis without hematuria       STATIN NOT PRESCRIBED (INTENTIONAL)      Please choose reason not prescribed, below    Type 1 diabetes mellitus with hyperglycemia (H)       Zinc 15 MG Caps      Take 1 capsule by mouth daily

## 2018-10-20 NOTE — PROGRESS NOTES
SUBJECTIVE:   Glory Birmingham is a 53 year old female presenting with a chief complaint of   Chief Complaint   Patient presents with     UTI     started tuesday, dysuria, abd pain/pelvic inflammed, cloudy urine, odor, increased fluid intake, no vaginal sx's, has low back pain        She is an established patient of [facility].    UTI    Onset of symptoms was 4 day(s).  Course of illness is worsening  Severity moderate  Current and associated symptoms dysuria and frequency  Treatment and measures tried None  Predisposing factors include none  Patient denies rigors, flank pain, temperature > 101 degrees F. and vomiting        Review of Systems   Constitutional: Negative for chills, fatigue, fever and unexpected weight change.   HENT: Negative for congestion, ear pain, postnasal drip, rhinorrhea, sinus pressure, sore throat and trouble swallowing.    Eyes: Negative for pain, redness and visual disturbance.   Respiratory: Negative for cough, chest tightness, shortness of breath and wheezing.    Cardiovascular: Negative for chest pain and palpitations.   Gastrointestinal: Negative for abdominal pain, diarrhea, nausea and vomiting.   Genitourinary: Positive for dysuria and frequency. Negative for flank pain, hematuria, vaginal discharge and vaginal pain.   Musculoskeletal: Negative for arthralgias, back pain and myalgias.   Skin: Negative for rash.       Past Medical History:   Diagnosis Date     Background diabetic retinopathy(362.01)     s/p photocoagulation     CAD (coronary artery disease) 4/21/2010 1/2014 She is known to have an occluded branch of her ramus with mild disease in the remainder of her coronary system based on angiography in 2008, along with repeat angiography in 2012. She has a history of stable angina which improved with Toprol-XL and Ranexa therapy.  Equivocal stress nuclear 5/08, breast attenuation vs. Small ischemic area.  Angiogram to clarify showed occluded ramus intermed     Coronary artery  disease      Depression      Dermatologic disease     Sclerederma adultorum, biopsy proven     Esophageal reflux      Fibromyalgia      Generalized anxiety disorder 2014     History of bilateral saline breast implants 2014 All breast tissue excised due to severe fibrocystic disease, told she does not need annual mammogram      Hypertension goal BP (blood pressure) < 140/90 2006    Cardiologist is Dr. Mcintyre at [facility]      Idiopathic hypersomnia with long sleep time      Mild major depression (H) 2012     Multinodular goiter      Pain in limb 2012     Peripheral neuropathy     EMG  ([facility]) and  ([place]) consistent with diabetic neuropathy     Peripheral vascular disease (H)      PONV (postoperative nausea and vomiting)      Proliferative diabetic retinopathy 10/26/2011     Thyroid condition 2012: Stable multinodular thyroid gland from 09.      Thyroid nodules 2009    Followed by endocrine - stable Rec continued observation See scanned documents  Problem list name updated by automated process. Provider to review and confirm     Type I (juvenile type) diabetes mellitus with other specified manifestations, not stated as uncontrolled 1976    retinopathy, neuropathy, nephropathy; on pancreas transplant list     Unspecified essential hypertension      Family History   Problem Relation Age of Onset     GASTROINTESTINAL DISEASE Mother      Thyroid Disease Mother      Arthritis Mother      Cardiomyopathy Mother      Cancer Father      lung, spread to bone and brain     HEART DISEASE Father      Arthritis Father      Diabetes Sister           HEART DISEASE Sister      Thyroid Disease Sister      C.A.D. Sister      Obesity Daughter      Allergies Son      Depression Sister      Thyroid Disease Sister      Thyroid Disease Sister      Alcohol/Drug Brother      Current Outpatient Prescriptions   Medication Sig Dispense Refill     amitriptyline (ELAVIL)  10 MG tablet Take 1 tablet (10 mg) by mouth At Bedtime 90 tablet 3     ASPIRIN 81 MG OR TABS 1 TABLET DAILY       cephALEXin (KEFLEX) 500 MG capsule Take 1 capsule (500 mg) by mouth 2 times daily for 7 days 14 capsule 0     cholecalciferol (VITAMIN D) 1000 UNIT tablet Take 1 tablet by mouth daily. 1 tablet 0     CINNAMON PO Take 500 mg by mouth daily       CRANBERRY PO Take 1 capsule by mouth daily       cyclobenzaprine (FLEXERIL) 10 MG tablet Take 0.5-1 tablets (5-10 mg) by mouth 3 times daily as needed for muscle spasms 30 tablet 3     diazepam (VALIUM) 2 MG tablet Take 1 tablet (2 mg) by mouth nightly as needed for anxiety or sleep 30 tablet 2     estradiol (ESTRACE) 0.5 MG tablet 1 tab vaginallly twice a week 24 tablet 3     insulin aspart (NOVOPEN ECHO) 100 UNIT/ML injection Insulin to carb ratio 1:8 grams with food.  Sliding scale with meals  115- 130:  0.5 units  131- 140: 1 unit  141- 150: 1.5 units  151- 160: 2 units  161- 170: 2.5 units  171 - 180: 3 units  181 - 190: 3.5 units  191 - 200: 4 units  201 - 210: 4.5 units  211 - 220: 5 units  221 - 230: 5.5 units  231 - 240:  6 units  Max 30 units/day 15 mL 5     insulin degludec (TRESIBA) 100 UNIT/ML pen 8 units in AM and 7 units in PM 15 mL 11     losartan (COZAAR) 25 MG tablet Take 0.5 tablets (12.5 mg) by mouth daily 45 tablet 3     magnesium 250 MG tablet Take 1 tablet by mouth daily.       OMEGA-3 FATTY ACIDS PO Take 500 mg by mouth daily        phenazopyridine (PYRIDIUM) 200 MG tablet Take 1 tablet (200 mg) by mouth 3 times daily as needed for irritation 6 tablet 0     STATIN NOT PRESCRIBED, INTENTIONAL, Please choose reason not prescribed, below       Zinc 15 MG CAPS Take 1 capsule by mouth daily        glucagon (GLUCAGON EMERGENCY) 1 MG injection Inject 1 mg Subcutaneous once for 1 dose 1 mg 1     nitroglycerin (NITROSTAT) 0.4 MG SL tablet Place 1 tablet (0.4 mg) under the tongue every 5 minutes as needed for chest pain (Patient not taking: Reported  on 8/24/2018) 25 tablet 3     [DISCONTINUED] BD ULTRA FINE PEN NEEDLES Inject 1 each Subcutaneous 3 times daily. 1 Box 11     Social History   Substance Use Topics     Smoking status: Never Smoker     Smokeless tobacco: Never Used     Alcohol use No       OBJECTIVE  /70  Pulse 97  Temp 96.8  F (36  C) (Tympanic)  Resp 20  Wt 152 lb 6.4 oz (69.1 kg)  SpO2 100%  BMI 26.34 kg/m2    Physical Exam   Constitutional: She appears well-developed and well-nourished. No distress.   Abdominal: There is no CVA tenderness.   Psychiatric: She has a normal mood and affect. Her behavior is normal.       Labs:  Results for orders placed or performed in visit on 10/20/18 (from the past 24 hour(s))   *UA reflex to Microscopic and Culture ([place] [facility] (except [place] and [place])   Result Value Ref Range    Color Urine Yellow     Appearance Urine Slightly Cloudy     Glucose Urine 500 (A) NEG^Negative mg/dL    Bilirubin Urine Negative NEG^Negative    Ketones Urine 15 (A) NEG^Negative mg/dL    Specific Gravity Urine 1.010 1.003 - 1.035    Blood Urine Large (A) NEG^Negative    pH Urine 5.5 5.0 - 7.0 pH    Protein Albumin Urine Negative NEG^Negative mg/dL    Urobilinogen Urine 0.2 0.2 - 1.0 EU/dL    Nitrite Urine Negative NEG^Negative    Leukocyte Esterase Urine Large (A) NEG^Negative    Source Midstream Urine    Urine Microscopic   Result Value Ref Range    WBC Urine >100 (A) OTO5^0 - 5 /HPF    RBC Urine O - 2 OTO2^O - 2 /HPF    Squamous Epithelial /LPF Urine Few FEW^Few /LPF    Bacteria Urine Moderate (A) NEG^Negative /HPF       X-Ray was not done.    ASSESSMENT:      ICD-10-CM    1. Acute cystitis without hematuria N30.00 cephALEXin (KEFLEX) 500 MG capsule     phenazopyridine (PYRIDIUM) 200 MG tablet   2. Urinary frequency R35.0 *UA reflex to Microscopic and Culture ([facility] (except [place] and [place])     Urine Microscopic   3. Nonspecific finding on examination of urine R82.90  Urine Culture Aerobic Bacterial        Medical Decision Making:    Differential Diagnosis:  UTI: UTI, Dysuria, Vaginitis and Interstitial Cystitis    Serious Comorbid Conditions:  Adult:  None    PLAN:    UTI Adult:  Will treat with keflex x 7 days and pyridium as needed. Push fluids. Will contact her if needed based on culture results. Return to clinic if symptoms worsen or do not improve; otherwise follow up as needed      Followup:    If not improving or if condition worsens, follow up with your Primary Care Provider    There are no Patient Instructions on file for this visit.    py

## 2018-10-21 LAB
BACTERIA SPEC CULT: ABNORMAL
Lab: ABNORMAL
SPECIMEN SOURCE: ABNORMAL

## 2018-10-30 ENCOUNTER — TELEPHONE (OUTPATIENT)
Dept: FAMILY MEDICINE | Facility: CLINIC | Age: 53
End: 2018-10-30

## 2018-10-30 DIAGNOSIS — R30.0 DYSURIA: ICD-10-CM

## 2018-10-30 DIAGNOSIS — R82.90 NONSPECIFIC FINDING ON EXAMINATION OF URINE: Primary | ICD-10-CM

## 2018-10-30 DIAGNOSIS — N30.01 ACUTE CYSTITIS WITH HEMATURIA: Primary | ICD-10-CM

## 2018-10-30 DIAGNOSIS — R30.0 DYSURIA: Primary | ICD-10-CM

## 2018-10-30 LAB
ALBUMIN UR-MCNC: NEGATIVE MG/DL
APPEARANCE UR: ABNORMAL
BACTERIA #/AREA URNS HPF: ABNORMAL /HPF
BILIRUB UR QL STRIP: NEGATIVE
COLOR UR AUTO: YELLOW
GLUCOSE UR STRIP-MCNC: 500 MG/DL
HGB UR QL STRIP: ABNORMAL
KETONES UR STRIP-MCNC: 15 MG/DL
LEUKOCYTE ESTERASE UR QL STRIP: ABNORMAL
NITRATE UR QL: NEGATIVE
NON-SQ EPI CELLS #/AREA URNS LPF: ABNORMAL /LPF
PH UR STRIP: 5 PH (ref 5–7)
RBC #/AREA URNS AUTO: ABNORMAL /HPF
SOURCE: ABNORMAL
SP GR UR STRIP: 1.01 (ref 1–1.03)
UROBILINOGEN UR STRIP-ACNC: 0.2 EU/DL (ref 0.2–1)
WBC #/AREA URNS AUTO: >100 /HPF

## 2018-10-30 PROCEDURE — 87088 URINE BACTERIA CULTURE: CPT | Performed by: NURSE PRACTITIONER

## 2018-10-30 PROCEDURE — 81001 URINALYSIS AUTO W/SCOPE: CPT | Performed by: NURSE PRACTITIONER

## 2018-10-30 PROCEDURE — 87086 URINE CULTURE/COLONY COUNT: CPT | Performed by: NURSE PRACTITIONER

## 2018-10-30 PROCEDURE — 87186 SC STD MICRODIL/AGAR DIL: CPT | Performed by: NURSE PRACTITIONER

## 2018-10-30 RX ORDER — NITROFURANTOIN 25; 75 MG/1; MG/1
100 CAPSULE ORAL 2 TIMES DAILY
Qty: 6 CAPSULE | Refills: 0 | Status: SHIPPED | OUTPATIENT
Start: 2018-10-30 | End: 2018-11-02

## 2018-10-30 NOTE — TELEPHONE ENCOUNTER
Patient states that her symptoms had resolved but now has burning with urination and cloudy urine.  Would like repeat UA.  Per Cathleen Acharya NP ok to have patient drop off UA.  Will call her with results.  Patient agreeable to plan.    Shanhaz Aguilera RN

## 2018-10-30 NOTE — TELEPHONE ENCOUNTER
Patient notified new prescription sent, will call back with culture results later in the week.  Patient verbalizes understanding.    Shahnaz Aguilera RN

## 2018-10-30 NOTE — TELEPHONE ENCOUNTER
Patient was seen in UC on 10/20 and completed her antibiotics but she is now feeling like her UTI symptoms are back. She is wondering if someone could work her in. Please advise.    Rossy Snowden-Station

## 2018-10-31 LAB
BACTERIA SPEC CULT: ABNORMAL
Lab: ABNORMAL
SPECIMEN SOURCE: ABNORMAL

## 2018-11-23 ENCOUNTER — TELEPHONE (OUTPATIENT)
Dept: FAMILY MEDICINE | Facility: CLINIC | Age: 53
End: 2018-11-23

## 2018-11-23 DIAGNOSIS — R80.9 TYPE 1 DIABETES MELLITUS WITH MICROALBUMINURIA (H): Chronic | ICD-10-CM

## 2018-11-23 DIAGNOSIS — I25.111 CORONARY ARTERY DISEASE INVOLVING NATIVE CORONARY ARTERY OF NATIVE HEART WITH ANGINA PECTORIS WITH DOCUMENTED SPASM (H): Primary | Chronic | ICD-10-CM

## 2018-11-23 DIAGNOSIS — E10.29 TYPE 1 DIABETES MELLITUS WITH MICROALBUMINURIA (H): Chronic | ICD-10-CM

## 2018-11-23 RX ORDER — METOPROLOL SUCCINATE 25 MG/1
25 TABLET, EXTENDED RELEASE ORAL DAILY
Qty: 90 TABLET | Refills: 3 | Status: SHIPPED | OUTPATIENT
Start: 2018-11-23 | End: 2019-04-17

## 2018-11-23 NOTE — TELEPHONE ENCOUNTER
Routing refill request for metoprolol ER 25mg to provider for review/approval because:  Drug not active on patient's medication list    Rx for Tresiba transferred. Last ordered 5/17/18, 15mL with 11 refills; refills adjusted.    Emerald JUDGE RN

## 2018-11-23 NOTE — TELEPHONE ENCOUNTER
Please transfer insulin degludec (TRESIBA) 100 UNIT/ML pen and Metoprolol ER 25mg RX to [facility], [place]

## 2019-01-04 DIAGNOSIS — R80.9 TYPE 1 DIABETES MELLITUS WITH MICROALBUMINURIA (H): Chronic | ICD-10-CM

## 2019-01-04 DIAGNOSIS — N95.2 ATROPHIC VAGINITIS: ICD-10-CM

## 2019-01-04 DIAGNOSIS — E10.29 TYPE 1 DIABETES MELLITUS WITH MICROALBUMINURIA (H): Chronic | ICD-10-CM

## 2019-01-04 RX ORDER — ESTRADIOL 0.5 MG/1
TABLET ORAL
Qty: 24 TABLET | Refills: 1 | Status: SHIPPED | OUTPATIENT
Start: 2019-01-04

## 2019-01-04 RX ORDER — LOSARTAN POTASSIUM 25 MG/1
12.5 TABLET ORAL DAILY
Qty: 45 TABLET | Refills: 1 | Status: SHIPPED | OUTPATIENT
Start: 2019-01-04

## 2019-01-04 NOTE — TELEPHONE ENCOUNTER
Sold house in [place], [place]., goes to [place] for the winter. Should be coming back to [place] in April 2019. Will be moving to [facility] [place] to build a home Once  Ground thaws.

## 2019-01-04 NOTE — TELEPHONE ENCOUNTER
"Requested Prescriptions   Pending Prescriptions Disp Refills     losartan (COZAAR) 25 MG tablet 45 tablet 3     Sig: Take 0.5 tablets (12.5 mg) by mouth daily    Angiotensin-II Receptors Failed - 1/4/2019  3:25 PM       Failed - Normal serum creatinine on file in past 12 months    Recent Labs   Lab Test 11/14/17  0615   CR 0.85            Failed - Normal serum potassium on file in past 12 months    Recent Labs   Lab Test 11/14/17  0615   POTASSIUM 4.0                   Passed - Blood pressure under 140/90 in past 12 months    BP Readings from Last 3 Encounters:   10/20/18 130/70   08/24/18 120/60   07/05/18 145/75                Passed - Recent (12 mo) or future (30 days) visit within the authorizing provider's specialty    Patient had office visit in the last 12 months or has a visit in the next 30 days with authorizing provider or within the authorizing provider's specialty.  See \"Patient Info\" tab in inbasket, or \"Choose Columns\" in Meds & Orders section of the refill encounter.             Passed - Medication is active on med list       Passed - Patient is age 18 or older       Passed - No active pregnancy on record       Passed - No positive pregnancy test in past 12 months        insulin aspart (NOVOPEN ECHO) 100 UNIT/ML cartridge 15 mL 5     Sig: Insulin to carb ratio 1:8 grams with food.  Sliding scale with meals  115- 130:  0.5 units  131- 140: 1 unit  141- 150: 1.5 units  151- 160: 2 units  161- 170: 2.5 units  171 - 180: 3 units  181 - 190: 3.5 units  191 - 200: 4 units  201 - 210: 4.5 units  211 - 220: 5 units  221 - 230: 5.5 units  231 - 240:  6 units  Max 30 units/day    Short Acting Insulin Protocol Failed - 1/4/2019  3:25 PM       Failed - Microalbumin on file in past 12 months    Recent Labs   Lab Test 08/14/17  0935 06/28/16   MICROALB  --  <10.0   MICROL 8  --    UMALCR 9.33 <10.0            Failed - Serum creatinine on file in past 12 months    Recent Labs   Lab Test 11/14/17  0615   CR 0.85      " "      Passed - Blood pressure less than 140/90 in past 6 months    BP Readings from Last 3 Encounters:   10/20/18 130/70   18 120/60   18 145/75                Passed - LDL on file in past 12 months    Recent Labs   Lab Test 18  0915   *            Passed - HgbA1C in past 3 or 6 months    If HgbA1C is 8 or greater, it needs to be on file within the past 3 months.  If less than 8, must be on file within the past 6 months.     Recent Labs   Lab Test 18  0915   A1C 7.6*            Passed - Medication is active on med list       Passed - Patient is age 18 or older       Passed - Recent (6 mo) or future (30 days) visit within the authorizing provider's specialty    Patient had office visit in the last 6 months or has a visit in the next 30 days with authorizing provider or within the authorizing provider's specialty.  See \"Patient Info\" tab in inbasket, or \"Choose Columns\" in Meds & Orders section of the refill encounter.            estradiol (ESTRACE) 0.5 MG tablet 24 tablet 3     Si tab vaginallly twice a week    Hormone Replacement Therapy Failed - 2019  3:25 PM       Failed - Patient has mammogram in past 2 years on file if age 50-75       Passed - Blood pressure under 140/90 in past 12 months    BP Readings from Last 3 Encounters:   10/20/18 130/70   18 120/60   18 145/75                Passed - Recent (12 mo) or future (30 days) visit within the authorizing provider's specialty    Patient had office visit in the last 12 months or has a visit in the next 30 days with authorizing provider or within the authorizing provider's specialty.  See \"Patient Info\" tab in inbasket, or \"Choose Columns\" in Meds & Orders section of the refill encounter.             Passed - Medication is active on med list       Passed - Patient is 18 years of age or older       Passed - No active pregnancy on record       Passed - No positive pregnancy test on record in past 12 months    "     Routing refill request to provider for review/approval because:  Failed protocols above.

## 2019-01-04 NOTE — TELEPHONE ENCOUNTER
Patient called stating [place] of [place], requesting refill on estradiol novologpen. Will be in [place] by April.    Marj CORDERO  Station

## 2019-01-04 NOTE — TELEPHONE ENCOUNTER
Covering for PCP:  Prescriptions refilled (insulin placed in my outbasket since prescription to long to send via computer).  She should follow-up when she is back in state in April.  Thanks.    Eduard Bloom MD

## 2019-01-07 ENCOUNTER — TELEPHONE (OUTPATIENT)
Dept: FAMILY MEDICINE | Facility: CLINIC | Age: 54
End: 2019-01-07

## 2019-01-08 NOTE — TELEPHONE ENCOUNTER
Prior Authorization Retail Medication Request    Medication/Dose: Novopen Echo  ICD code (if different than what is on RX):    Previously Tried and Failed:  Glucagon; novolin R; novolog vial; tresiba; lantus solostar;   Rationale:  Patient has used Echo since 11/2017    Insurance Name:  Medicare  Insurance ID:  [id number]      Pharmacy Information (if different than what is on RX)  Name:    Phone:

## 2019-01-11 NOTE — TELEPHONE ENCOUNTER
Prior Authorization Approval    Authorization Effective Date: 10/13/2018  Authorization Expiration Date: 1/11/2020  Medication: Novopen Echo  Approved Dose/Quantity:    Reference #:     Insurance Company: [employer] - Phone [phone] Fax [phone]  Expected CoPay:       CoPay Card Available:      Foundation Assistance Needed:    Which Pharmacy is filling the prescription (Not needed for infusion/clinic administered): [id number] - [place], [place] - [address]  Pharmacy Notified: Yes  Patient Notified: Yes

## 2019-01-11 NOTE — TELEPHONE ENCOUNTER
Central Prior Authorization Team   Phone: [phone]      PA Initiation    Medication: Novopen Echo  Insurance Company: [employer] - Phone [phone] Fax [phone]  Pharmacy Filling the Rx: [id number] - [place], [place] - [address]   Filling Pharmacy Phone: [phone]  Filling Pharmacy Fax:    Start Date: 1/11/2019      Manually faxed in P/A request.

## 2019-01-28 ENCOUNTER — TELEPHONE (OUTPATIENT)
Dept: FAMILY MEDICINE | Facility: CLINIC | Age: 54
End: 2019-01-28

## 2019-01-28 NOTE — TELEPHONE ENCOUNTER
Reason for Call:  Other prescription    Detailed comments: [facility] [place] is calling and asking for clarification on the larry pen fills is that what is meant by the cartridges.    Phone Number Patient can be reached at: Other phone number:  [phone] [facility]    Best Time: any    Can we leave a detailed message on this number? Not Applicable    Call taken on 1/28/2019 at 2:15 PM by Christiana Danielle

## 2019-01-28 NOTE — TELEPHONE ENCOUNTER
The patient is called and it is the novopen echo she has.  The pharmacy is called and they are unclear of what a novopen echo cartridge is.  Per the pharmacist they have showed the patient a picture of a novopen cartridge and the patient states that is what she needs.  I have confirmed with the pharmacist that this novopen cartridge is 100 units per ML. They are to show the patient the cartridges again before she purchases this.  One box is 15 ML and 5 refills. Stephanie FERNANDEZ RN

## 2019-02-22 DIAGNOSIS — E10.29 TYPE 1 DIABETES MELLITUS WITH MICROALBUMINURIA (H): Chronic | ICD-10-CM

## 2019-02-22 DIAGNOSIS — R80.9 TYPE 1 DIABETES MELLITUS WITH MICROALBUMINURIA (H): Chronic | ICD-10-CM

## 2019-02-22 NOTE — TELEPHONE ENCOUNTER
Patient reports she does not need novolog insulin right now.  She just picked up her RX at the pharmacy and has enough until April when she return to [place] for appt 4-17-19.  She does need needles so those were sent to pharmacy.  Whitney TRIPATHI RN

## 2019-04-10 DIAGNOSIS — E11.9 DIABETES MELLITUS (H): Primary | ICD-10-CM

## 2019-04-17 ENCOUNTER — TELEPHONE (OUTPATIENT)
Dept: INTERNAL MEDICINE | Facility: CLINIC | Age: 54
End: 2019-04-17

## 2019-04-17 ENCOUNTER — OFFICE VISIT (OUTPATIENT)
Dept: FAMILY MEDICINE | Facility: CLINIC | Age: 54
End: 2019-04-17
Payer: MEDICARE

## 2019-04-17 VITALS
OXYGEN SATURATION: 98 % | DIASTOLIC BLOOD PRESSURE: 62 MMHG | BODY MASS INDEX: 25.44 KG/M2 | SYSTOLIC BLOOD PRESSURE: 116 MMHG | RESPIRATION RATE: 12 BRPM | HEART RATE: 86 BPM | HEIGHT: 64 IN | WEIGHT: 149 LBS | TEMPERATURE: 97.4 F

## 2019-04-17 DIAGNOSIS — E11.9 DIABETES MELLITUS (H): ICD-10-CM

## 2019-04-17 DIAGNOSIS — E10.65 TYPE 1 DIABETES MELLITUS WITH HYPERGLYCEMIA (H): Primary | Chronic | ICD-10-CM

## 2019-04-17 DIAGNOSIS — E78.5 HYPERLIPIDEMIA LDL GOAL <70: Chronic | ICD-10-CM

## 2019-04-17 DIAGNOSIS — I10 BENIGN ESSENTIAL HYPERTENSION: ICD-10-CM

## 2019-04-17 LAB
ALBUMIN SERPL-MCNC: 3.5 G/DL (ref 3.4–5)
ALP SERPL-CCNC: 94 U/L (ref 40–150)
ALT SERPL W P-5'-P-CCNC: 26 U/L (ref 0–50)
ANION GAP SERPL CALCULATED.3IONS-SCNC: 3 MMOL/L (ref 3–14)
AST SERPL W P-5'-P-CCNC: 17 U/L (ref 0–45)
BILIRUB SERPL-MCNC: 0.5 MG/DL (ref 0.2–1.3)
BUN SERPL-MCNC: 26 MG/DL (ref 7–30)
CALCIUM SERPL-MCNC: 8.5 MG/DL (ref 8.5–10.1)
CHLORIDE SERPL-SCNC: 106 MMOL/L (ref 94–109)
CO2 SERPL-SCNC: 29 MMOL/L (ref 20–32)
CREAT SERPL-MCNC: 0.78 MG/DL (ref 0.52–1.04)
CREAT UR-MCNC: 83 MG/DL
GFR SERPL CREATININE-BSD FRML MDRD: 87 ML/MIN/{1.73_M2}
GLUCOSE SERPL-MCNC: 146 MG/DL (ref 70–99)
HBA1C MFR BLD: 8.4 % (ref 0–5.6)
MICROALBUMIN UR-MCNC: 8 MG/L
MICROALBUMIN/CREAT UR: 9.34 MG/G CR (ref 0–25)
POTASSIUM SERPL-SCNC: 3.9 MMOL/L (ref 3.4–5.3)
PROT SERPL-MCNC: 6.8 G/DL (ref 6.8–8.8)
SODIUM SERPL-SCNC: 138 MMOL/L (ref 133–144)

## 2019-04-17 PROCEDURE — 82043 UR ALBUMIN QUANTITATIVE: CPT | Performed by: INTERNAL MEDICINE

## 2019-04-17 PROCEDURE — 80053 COMPREHEN METABOLIC PANEL: CPT | Performed by: INTERNAL MEDICINE

## 2019-04-17 PROCEDURE — 83036 HEMOGLOBIN GLYCOSYLATED A1C: CPT | Performed by: INTERNAL MEDICINE

## 2019-04-17 PROCEDURE — 99214 OFFICE O/P EST MOD 30 MIN: CPT | Performed by: INTERNAL MEDICINE

## 2019-04-17 PROCEDURE — 36415 COLL VENOUS BLD VENIPUNCTURE: CPT | Performed by: INTERNAL MEDICINE

## 2019-04-17 ASSESSMENT — MIFFLIN-ST. JEOR: SCORE: 1262.37

## 2019-04-17 ASSESSMENT — PATIENT HEALTH QUESTIONNAIRE - PHQ9: SUM OF ALL RESPONSES TO PHQ QUESTIONS 1-9: 3

## 2019-04-17 NOTE — PROGRESS NOTES
SUBJECTIVE:   Glory Birmingham is a 53 year old female who presents to clinic today for the following   health issues:      Glucometer: yes    Diabetes Follow-up    Patient is checking blood sugars: four times daily.    Blood sugar testing frequency justification: Uncontrolled diabetes  Results are as follows:         66 once - average 200    Diabetic concerns: None     Symptoms of hypoglycemia (low blood sugar): none     Paresthesias (numbness or burning in feet) or sores: Yes      Date of last diabetic eye exam: [facility] in [place] 9/4/18 [facility]    Traveling a lot and has noted blood sugar have not been as well controlled.    Will be moving to [place] full-time in the next few months.  Has appointment to 'establish care' in June.    Having issues with Dexcom supplies , needs a letter for this today.  Has been without Dexcom for > 1 month.  Often forgets insulin because the Dexcom will remind her to take the insulin.    Still on ketogenic diet, 35-40 g carb/day.    She has been staying at other people's house and they have been cooking for, she normally has very tight control of her carb intake.    tresiba 12 Am and 6 PM.  With higher PM doses had overnight hypoglycemia.    Novolog 1:8 ratio plus sliding scale on half units.  Estimates she is using 8-10 units of novolog per day.    Eats twice daily    Diabetes Management Resources    Hyperlipidemia Follow-Up      Rate your low fat/cholesterol diet?: good    Taking statin?  No    Other lipid medications/supplements?:  none    Hypertension Follow-up      Outpatient blood pressures are not being checked.    Low Salt Diet: no added salt    BP Readings from Last 2 Encounters:   04/17/19 116/62   10/20/18 130/70     Hemoglobin A1C (%)   Date Value   04/17/2019 8.4 (H)   08/21/2018 7.6 (H)     LDL Cholesterol Calculated (mg/dL)   Date Value   08/21/2018 136 (H)   08/14/2017 67       Amount of exercise or physical activity: None    Problems taking medications regularly:  No    Medication side effects: none    Diet: regular (no restrictions)        Reviewed  and updated as needed this visit by clinical staff  Tobacco  Allergies  Meds  Med Hx  Surg Hx  Fam Hx  Soc Hx        Reviewed and updated as needed this visit by Provider  Meds         Current Outpatient Medications   Medication Sig Dispense Refill     ASPIRIN 81 MG OR TABS 1 TABLET DAILY       cholecalciferol (VITAMIN D) 1000 UNIT tablet Take 1 tablet by mouth daily. 1 tablet 0     CINNAMON PO Take 500 mg by mouth daily       CRANBERRY PO Take 1 capsule by mouth daily       estradiol (ESTRACE) 0.5 MG tablet 1 tab vaginallly twice a week 24 tablet 1     insulin aspart (NOVOPEN ECHO) 100 UNIT/ML cartridge Insulin to carb ratio 1:8 grams with food.  Sliding scale with meals  115- 130:  0.5 units  131- 140: 1 unit  141- 150: 1.5 units  151- 160: 2 units  161- 170: 2.5 units  171 - 180: 3 units  181 - 190: 3.5 units  191 - 200: 4 units  201 - 210: 4.5 units  211 - 220: 5 units  221 - 230: 5.5 units  231 - 240:  6 units  Max 30 units/day 15 mL 5     insulin degludec (TRESIBA) 100 UNIT/ML pen 8 units in AM and 7 units in PM (Patient taking differently: 12 units in AM and 6 units in PM) 15 mL 5     insulin pen needle (32G X 4 MM) 32G X 4 MM miscellaneous Use 3 pen needles daily or as directed. 200 each 1     losartan (COZAAR) 25 MG tablet Take 0.5 tablets (12.5 mg) by mouth daily 45 tablet 1     magnesium 250 MG tablet Take 1 tablet by mouth daily.       OMEGA-3 FATTY ACIDS PO Take 500 mg by mouth daily        Zinc 15 MG CAPS Take 1 capsule by mouth daily        diazepam (VALIUM) 2 MG tablet Take 1 tablet (2 mg) by mouth nightly as needed for anxiety or sleep (Patient not taking: Reported on 4/17/2019) 30 tablet 2     glucagon (GLUCAGON EMERGENCY) 1 MG injection Inject 1 mg Subcutaneous once for 1 dose 1 mg 1     nitroglycerin (NITROSTAT) 0.4 MG SL tablet Place 1 tablet (0.4 mg) under the tongue every 5 minutes as needed for  "chest pain (Patient not taking: Reported on 8/24/2018) 25 tablet 3     phenazopyridine (PYRIDIUM) 200 MG tablet Take 1 tablet (200 mg) by mouth 3 times daily as needed for irritation (Patient not taking: Reported on 4/17/2019) 6 tablet 0     STATIN NOT PRESCRIBED, INTENTIONAL, Please choose reason not prescribed, below (Patient not taking: Reported on 4/17/2019)         ROS:  Constitutional, HEENT, cardiovascular, pulmonary, GI, , musculoskeletal, neuro, skin, endocrine and psych systems are negative, except as otherwise noted.    OBJECTIVE:     /62   Pulse 86   Temp 97.4  F (36.3  C) (Tympanic)   Resp 12   Ht 1.62 m (5' 3.78\")   Wt 67.6 kg (149 lb)   SpO2 98%   Breastfeeding? No   BMI 25.75 kg/m    Body mass index is 25.75 kg/m .  GENERAL APPEARANCE: healthy, alert and no distress    Diagnostic Test Results:  Results for orders placed or performed in visit on 04/17/19 (from the past 24 hour(s))   **A1C FUTURE 1yr   Result Value Ref Range    Hemoglobin A1C 8.4 (H) 0 - 5.6 %       ASSESSMENT/PLAN:     1. Type 1 diabetes mellitus with hyperglycemia (HCC) - uncontrolled.  Patient missing many novolog doses, not eating proper.  Counseled on these lifestyle changes.  Wrote letter for dexcom supplies.  No changes to insulin dosing.  Follow-up with new PCP in [facility]    2. Hyperlipidemia LDL goal <70- intolerant to multiple statins    3. Benign essential hypertension - well controlled        Dr. Dinorah Lorenzo,   [facility]      "

## 2019-04-17 NOTE — PATIENT INSTRUCTIONS
1. Letter written for Dexcom  2. Work to take the Novolog regularly prior to a meal  3. Follow-up as planned with doctor in [place] in June.

## 2019-04-17 NOTE — LETTER
April 17, 2019      Glory Birmingham  [address]   [facility] [address]        To Whom It May Concern:    Glory Birmingham is under my care for diabetes type 1.  She checks her blood sugar at least four times per day, and uses insulin at least four times per day.  She has hypoglycemia unawareness and requires the Dexcom to prevent hypoglycemia coma.    Sincerely,        Dinorah Lorenzo, DO

## 2019-04-18 ENCOUNTER — TELEPHONE (OUTPATIENT)
Dept: INTERNAL MEDICINE | Facility: CLINIC | Age: 54
End: 2019-04-18

## 2019-04-18 NOTE — TELEPHONE ENCOUNTER
Dexcom chart note request form received, completed and faxed chart notes from 4/17/19 office visit to [facility] at [phone]

## 2019-09-29 ENCOUNTER — HEALTH MAINTENANCE LETTER (OUTPATIENT)
Age: 54
End: 2019-09-29

## 2020-03-15 ENCOUNTER — HEALTH MAINTENANCE LETTER (OUTPATIENT)
Age: 55
End: 2020-03-15

## 2020-07-29 NOTE — PATIENT INSTRUCTIONS
Will test for lyme disease     Finish the course of antibiotic     Follow up with PCP as planned   
PAST MEDICAL HISTORY:  Conduct disorder     Dental caries

## 2021-01-14 ENCOUNTER — HEALTH MAINTENANCE LETTER (OUTPATIENT)
Age: 56
End: 2021-01-14

## 2021-05-08 ENCOUNTER — HEALTH MAINTENANCE LETTER (OUTPATIENT)
Age: 56
End: 2021-05-08

## 2021-08-28 ENCOUNTER — HEALTH MAINTENANCE LETTER (OUTPATIENT)
Age: 56
End: 2021-08-28

## 2021-10-23 ENCOUNTER — HEALTH MAINTENANCE LETTER (OUTPATIENT)
Age: 56
End: 2021-10-23

## 2021-12-18 ENCOUNTER — HEALTH MAINTENANCE LETTER (OUTPATIENT)
Age: 56
End: 2021-12-18

## 2022-04-09 ENCOUNTER — HEALTH MAINTENANCE LETTER (OUTPATIENT)
Age: 57
End: 2022-04-09

## 2022-06-04 ENCOUNTER — HEALTH MAINTENANCE LETTER (OUTPATIENT)
Age: 57
End: 2022-06-04

## 2022-07-30 ENCOUNTER — HEALTH MAINTENANCE LETTER (OUTPATIENT)
Age: 57
End: 2022-07-30

## 2022-08-21 NOTE — TELEPHONE ENCOUNTER
Pt called back and she does not want umu naranjoin. It hurts her stomach.Please prescribe something else if appropriate. [facility]. Please call her at [phone]   Health Care Proxy

## 2022-10-10 ENCOUNTER — HEALTH MAINTENANCE LETTER (OUTPATIENT)
Age: 57
End: 2022-10-10

## 2022-11-27 ENCOUNTER — HEALTH MAINTENANCE LETTER (OUTPATIENT)
Age: 57
End: 2022-11-27

## 2023-03-25 ENCOUNTER — HEALTH MAINTENANCE LETTER (OUTPATIENT)
Age: 58
End: 2023-03-25

## 2023-06-11 ENCOUNTER — HEALTH MAINTENANCE LETTER (OUTPATIENT)
Age: 58
End: 2023-06-11

## 2023-08-20 ENCOUNTER — HEALTH MAINTENANCE LETTER (OUTPATIENT)
Age: 58
End: 2023-08-20

## 2024-01-06 ENCOUNTER — HEALTH MAINTENANCE LETTER (OUTPATIENT)
Age: 59
End: 2024-01-06

## 2024-12-31 NOTE — PROGRESS NOTES
SUBJECTIVE:                                                    Glory Birmingham is a 51 year old female who presents to clinic today for the following health issues:  Chief Complaint   Patient presents with     Chronic Disease Management     Diabetes, CAD, Thyroid, Depression/Anxiety Follow up     Refill Request     meds pended      Diabetes Follow-up    Patient is checking blood sugars: four times daily.    Blood sugar testing frequency justification: Risk of hypoglycemia with medication(s)  Results are as follows:   am - 180-220  (3pm) -180   suppertime - 200   bedtime - 200  Diabetic concerns: None     Symptoms of hypoglycemia (low blood sugar): none     Paresthesias (numbness or burning in feet) or sores: Has Sores and Spots on Her Legs She Wants Checked. She does have Neuropathy in Feet      Date of last diabetic eye exam: May     Shelby that switch to Tresibia initially helped, was helping with hyperglycemia    She is being more diligent about taking her shots before meals.    She plans to get Dexcom for CGM.    Taking Tresiba 15 units - had hypoglycemia at 17 units    novolog 1 unit/12 gm carb, plus sliding scale correction which she has on her phone.  She corrects with insulin for snacks.    She is worried about hypoglycemia and her brittle nature is leading to hyperglycemia.  She thinks that once the CGM is connected, she will have the security of the CGM    Hyperlipidemia Follow-Up      Rate your low fat/cholesterol diet?: good    Taking statin?  Yes, takes this every other day     Other lipid medications/supplements?:  Fish oil/Omega 3, dose 500mg  without side effects    Hypertension Follow-up      Outpatient blood pressures are not being checked.    Low Salt Diet: no added salt    Vascular Disease Follow-up:  Coronary Artery Disease (CAD)      Chest pain or pressure, left side neck or arm pain: No    Shortness of breath/increased sweats/nausea with exertion: Yes she has always had chest discomfort with  exertion    Pain in calves walking 1-2 blocks: Yes     Worsened or new symptoms since last visit: No    Nitroglycerin use: uses last in June     Daily aspirin use: Yes    Depression and Anxiety Follow-Up    Status since last visit: Stable     Other associated symptoms:None    Complicating factors:     Significant life event: No     Current substance abuse: None    Amitriptyline is helping with sleep and nerve pain.  Rarely uses Valium, 1-2 x week.    PHQ-9 SCORE 6/11/2015 11/12/2015 8/2/2016   Total Score 2 - -   Total Score - 11 8     EZEQUIEL-7 SCORE 6/11/2015 11/12/2015 7/19/2016   Total Score 0 - -   Total Score - 5 0       PHQ-9  English  PHQ-9   Any Language  GAD7      Hypothyroidism Follow-up      Since last visit, patient describes the following symptoms: Weight stable, no hair loss, no skin changes, no constipation, no loose stools    Amount of exercise or physical activity: 2-3 days/week for an average of 15-30 minutes    Problems taking medications regularly: No    Medication side effects: none    Diet: diabetic and carbohydrate counting    She duran in [place] from Mid-Nov to April, her provider there will fill Ritalin in winter months.      Current Outpatient Prescriptions   Medication Sig Dispense Refill     insulin aspart (NOVOLOG PEN) 100 UNIT/ML injection 1 unit per 12 grams of carb at meals, sliding scale: 150-190 +1, 191-230 +2, 231-270 +3, 271-310 +4, 311-350 +5, 351-390 +6, 391-430 +7, 431-470 +8 15 mL 0     metoprolol (TOPROL-XL) 25 MG 24 hr tablet TAKE ONE TABLET BY MOUTH ONCE DAILY 90 tablet 0     amitriptyline (ELAVIL) 10 MG tablet TAKE ONE TABLET BY MOUTH AT BEDTIME 90 tablet 0     insulin degludec (TRESIBA) 100 UNIT/ML pen Take 17 units at bedtime (increase by 1 unit every 5 days till am readings are under 180) 15 mL 1     blood glucose monitoring (ONE TOUCH VERIO IQ) test strip Use to test blood sugar 6 times daily or as directed. 500 strip 3     CINNAMON PO Take 500 mg by mouth daily        CRANBERRY PO Take 1 capsule by mouth daily       diazepam (VALIUM) 2 MG tablet Take 1 tablet (2 mg) by mouth nightly as needed for anxiety or sleep 30 tablet 5     methylphenidate (RITALIN) 20 MG tablet Take 0.5-1 tablets (10-20 mg) by mouth daily 30 tablet 0     losartan (COZAAR) 25 MG tablet Take 0.5 tablets (12.5 mg) by mouth daily 45 tablet 3     estradiol (ESTRACE) 0.5 MG tablet 1 tab vaginallly twice a week (Patient taking differently: Place 1 mg vaginally twice a week Sunday and Wednesday) 24 tablet 3     omeprazole (PRILOSEC) 20 MG capsule Take 1 capsule (20 mg) by mouth daily 90 capsule 3     BD ULTRA FINE PEN NEEDLES Inject 1 each Subcutaneous 6 times daily 1 Box 3     OMEGA-3 FATTY ACIDS PO Take 500 mg by mouth daily        nitroglycerin (NITROSTAT) 0.4 MG SL tablet Place 1 tablet (0.4 mg) under the tongue every 5 minutes as needed for chest pain 25 tablet 3     blood glucose test strip Use as directed, up to 6 times a day. One Touch test strips 500 strip 2     magnesium 250 MG tablet Take 1 tablet by mouth daily.       Zinc 15 MG CAPS Take 1 capsule by mouth daily        cholecalciferol (VITAMIN D) 1000 UNIT tablet Take 1 tablet by mouth daily. 1 tablet 0     ASPIRIN 81 MG OR TABS 1 TABLET DAILY       atorvastatin (LIPITOR) 10 MG tablet Take 5mg (one-half tablet) twice a week (Patient not taking: Reported on 8/15/2017) 30 tablet 3     glucagon (GLUCAGON EMERGENCY) 1 MG injection Inject 1 mg Subcutaneous once for 1 dose 1 mg 1     [DISCONTINUED] BD ULTRA FINE PEN NEEDLES Inject 1 each Subcutaneous 3 times daily. 1 Box 11       Reviewed and updated as needed this visit by clinical staffTobacco  Allergies  Meds  Problems  Med Hx  Surg Hx  Fam Hx  Soc Hx        Reviewed and updated as needed this visit by Provider  Allergies  Meds  Problems         ROS:  Constitutional, HEENT, cardiovascular, pulmonary, gi and gu systems are negative, except as otherwise noted.      OBJECTIVE:   /68 (BP  "Location: Left arm, Patient Position: Chair, Cuff Size: Adult Regular)  Pulse 88  Temp 98.5  F (36.9  C) (Tympanic)  Ht 5' 4\" (1.626 m)  Wt 155 lb 12.8 oz (70.7 kg)  BMI 26.74 kg/m2  Body mass index is 26.74 kg/(m^2).  GENERAL APPEARANCE: healthy, alert and no distress    Diagnostic Test Results:  Results for orders placed or performed in visit on 08/14/17   Lipid panel reflex to direct LDL   Result Value Ref Range    Cholesterol 163 <200 mg/dL    Triglycerides 55 <150 mg/dL    HDL Cholesterol 85 >49 mg/dL    LDL Cholesterol Calculated 67 <100 mg/dL    Non HDL Cholesterol 78 <130 mg/dL   **Albumin Random Urine Quant FUTURE 3mo   Result Value Ref Range    Creatinine Urine 90 mg/dL    Albumin Urine mg/L 8 mg/L    Albumin Urine mg/g Cr 9.33 0 - 25 mg/g Cr   Basic metabolic panel   Result Value Ref Range    Sodium 138 133 - 144 mmol/L    Potassium 3.9 3.4 - 5.3 mmol/L    Chloride 104 94 - 109 mmol/L    Carbon Dioxide 32 20 - 32 mmol/L    Anion Gap 2 (L) 3 - 14 mmol/L    Glucose 175 (H) 70 - 99 mg/dL    Urea Nitrogen 20 7 - 30 mg/dL    Creatinine 0.67 0.52 - 1.04 mg/dL    GFR Estimate >90  Non  GFR Calc   >60 mL/min/1.7m2    GFR Estimate If Black >90   GFR Calc   >60 mL/min/1.7m2    Calcium 8.5 8.5 - 10.1 mg/dL   Hemoglobin A1c   Result Value Ref Range    Hemoglobin A1C 9.3 (H) 4.3 - 6.0 %       ASSESSMENT/PLAN:     1. Type 1 diabetes mellitus with microalbuminuria (H) - still uncontrolled, but improving control.  No changes to meds.  Follow-up with Dexcom paperwork.  See me in fall before traveling for winter  - losartan (COZAAR) 25 MG tablet; Take 0.5 tablets (12.5 mg) by mouth daily  Dispense: 45 tablet; Refill: 3  - insulin degludec (TRESIBA) 100 UNIT/ML pen; Inject 15 Units Subcutaneous At Bedtime  Dispense: 15 mL; Refill: 3  - **A1C FUTURE anytime; Future    2. Benign essential hypertension -  - stable, refill provided  - metoprolol (TOPROL-XL) 25 MG 24 hr tablet; Take 1 tablet " (25 mg) by mouth daily  Dispense: 90 tablet; Refill: 3    3. Bladder pain -  - stable, refill provided  - amitriptyline (ELAVIL) 10 MG tablet; Take 1 tablet (10 mg) by mouth At Bedtime  Dispense: 90 tablet; Refill: 3    4. Hypersomnia - refills through mid-dec when [place] provider will fill  - methylphenidate (RITALIN) 20 MG tablet; Take 0.5-1 tablets (10-20 mg) by mouth daily  Dispense: 30 tablet; Refill: 0  - methylphenidate (RITALIN) 20 MG tablet; Take 0.5-1 tablets (10-20 mg) by mouth daily  Dispense: 30 tablet; Refill: 0  - methylphenidate (RITALIN) 20 MG tablet; Take 0.5-1 tablets (10-20 mg) by mouth daily  Dispense: 30 tablet; Refill: 0  - methylphenidate (RITALIN) 20 MG tablet; Take 0.5-1 tablets (10-20 mg) by mouth daily  Dispense: 30 tablet; Refill: 0    5. Coronary artery disease due to lipid rich plaque -  - stable, refill provided  - atorvastatin (LIPITOR) 10 MG tablet; Take 5mg (one-half tablet) twice a week  Dispense: 30 tablet; Refill: 3    6. Gastroesophageal reflux disease without esophagitis - - stable, refill provided  - omeprazole (PRILOSEC) 20 MG CR capsule; Take 1 capsule (20 mg) by mouth daily  Dispense: 90 capsule; Refill: 3    1. [facility] what [facility] need.  2. See DR. Lorenzo once prior to leaving for [place]  3. Refills of Ritalin given through mid-Dec    Dinorah Lorenzo,   [facility]   minimum assist (75% patients effort)